# Patient Record
Sex: FEMALE | Race: WHITE | NOT HISPANIC OR LATINO | Employment: OTHER | URBAN - METROPOLITAN AREA
[De-identification: names, ages, dates, MRNs, and addresses within clinical notes are randomized per-mention and may not be internally consistent; named-entity substitution may affect disease eponyms.]

---

## 2017-04-05 DIAGNOSIS — I10 ESSENTIAL (PRIMARY) HYPERTENSION: ICD-10-CM

## 2017-04-05 DIAGNOSIS — E55.9 VITAMIN D DEFICIENCY: ICD-10-CM

## 2017-04-05 DIAGNOSIS — E53.8 DEFICIENCY OF OTHER SPECIFIED B GROUP VITAMINS: ICD-10-CM

## 2017-04-05 DIAGNOSIS — E78.00 PURE HYPERCHOLESTEROLEMIA: ICD-10-CM

## 2017-04-05 DIAGNOSIS — E01.8 OTHER IODINE-DEFICIENCY RELATED THYROID DISORDERS AND ALLIED CONDITIONS: ICD-10-CM

## 2017-04-07 ENCOUNTER — LAB (OUTPATIENT)
Dept: LAB | Facility: MEDICAL CENTER | Age: 72
End: 2017-04-07
Payer: MEDICARE

## 2017-04-07 DIAGNOSIS — I10 ESSENTIAL (PRIMARY) HYPERTENSION: ICD-10-CM

## 2017-04-07 DIAGNOSIS — E78.00 PURE HYPERCHOLESTEROLEMIA: ICD-10-CM

## 2017-04-07 DIAGNOSIS — E53.8 DEFICIENCY OF OTHER SPECIFIED B GROUP VITAMINS: ICD-10-CM

## 2017-04-07 DIAGNOSIS — E01.8 OTHER IODINE-DEFICIENCY RELATED THYROID DISORDERS AND ALLIED CONDITIONS: ICD-10-CM

## 2017-04-07 LAB
ALBUMIN SERPL BCP-MCNC: 3.8 G/DL (ref 3.5–5)
ALP SERPL-CCNC: 69 U/L (ref 46–116)
ALT SERPL W P-5'-P-CCNC: 25 U/L (ref 12–78)
ANION GAP SERPL CALCULATED.3IONS-SCNC: 6 MMOL/L (ref 4–13)
AST SERPL W P-5'-P-CCNC: 20 U/L (ref 5–45)
BASOPHILS # BLD AUTO: 0.03 THOUSANDS/ΜL (ref 0–0.1)
BASOPHILS NFR BLD AUTO: 1 % (ref 0–1)
BILIRUB SERPL-MCNC: 0.49 MG/DL (ref 0.2–1)
BUN SERPL-MCNC: 11 MG/DL (ref 5–25)
CALCIUM SERPL-MCNC: 9 MG/DL (ref 8.3–10.1)
CHLORIDE SERPL-SCNC: 106 MMOL/L (ref 100–108)
CHOLEST SERPL-MCNC: 255 MG/DL (ref 50–200)
CO2 SERPL-SCNC: 28 MMOL/L (ref 21–32)
CREAT SERPL-MCNC: 0.74 MG/DL (ref 0.6–1.3)
EOSINOPHIL # BLD AUTO: 0.2 THOUSAND/ΜL (ref 0–0.61)
EOSINOPHIL NFR BLD AUTO: 6 % (ref 0–6)
ERYTHROCYTE [DISTWIDTH] IN BLOOD BY AUTOMATED COUNT: 13.8 % (ref 11.6–15.1)
GFR SERPL CREATININE-BSD FRML MDRD: >60 ML/MIN/1.73SQ M
GLUCOSE P FAST SERPL-MCNC: 80 MG/DL (ref 65–99)
HCT VFR BLD AUTO: 41.4 % (ref 34.8–46.1)
HDLC SERPL-MCNC: 101 MG/DL (ref 40–60)
HGB BLD-MCNC: 13.5 G/DL (ref 11.5–15.4)
LDLC SERPL CALC-MCNC: 142 MG/DL (ref 0–100)
LYMPHOCYTES # BLD AUTO: 1.08 THOUSANDS/ΜL (ref 0.6–4.47)
LYMPHOCYTES NFR BLD AUTO: 34 % (ref 14–44)
MAGNESIUM SERPL-MCNC: 2.5 MG/DL (ref 1.6–2.6)
MCH RBC QN AUTO: 31.9 PG (ref 26.8–34.3)
MCHC RBC AUTO-ENTMCNC: 32.6 G/DL (ref 31.4–37.4)
MCV RBC AUTO: 98 FL (ref 82–98)
MONOCYTES # BLD AUTO: 0.44 THOUSAND/ΜL (ref 0.17–1.22)
MONOCYTES NFR BLD AUTO: 14 % (ref 4–12)
NEUTROPHILS # BLD AUTO: 1.41 THOUSANDS/ΜL (ref 1.85–7.62)
NEUTS SEG NFR BLD AUTO: 45 % (ref 43–75)
NRBC BLD AUTO-RTO: 0 /100 WBCS
PLATELET # BLD AUTO: 245 THOUSANDS/UL (ref 149–390)
PMV BLD AUTO: 10.2 FL (ref 8.9–12.7)
POTASSIUM SERPL-SCNC: 4 MMOL/L (ref 3.5–5.3)
PROT SERPL-MCNC: 6.9 G/DL (ref 6.4–8.2)
RBC # BLD AUTO: 4.23 MILLION/UL (ref 3.81–5.12)
SODIUM SERPL-SCNC: 140 MMOL/L (ref 136–145)
T4 FREE SERPL-MCNC: 1.17 NG/DL (ref 0.76–1.46)
TRIGL SERPL-MCNC: 62 MG/DL
TSH SERPL DL<=0.05 MIU/L-ACNC: 2.61 UIU/ML (ref 0.36–3.74)
WBC # BLD AUTO: 3.16 THOUSAND/UL (ref 4.31–10.16)

## 2017-04-07 PROCEDURE — 84439 ASSAY OF FREE THYROXINE: CPT

## 2017-04-07 PROCEDURE — 80061 LIPID PANEL: CPT

## 2017-04-07 PROCEDURE — 84443 ASSAY THYROID STIM HORMONE: CPT

## 2017-04-07 PROCEDURE — 85025 COMPLETE CBC W/AUTO DIFF WBC: CPT

## 2017-04-07 PROCEDURE — 83735 ASSAY OF MAGNESIUM: CPT

## 2017-04-07 PROCEDURE — 80053 COMPREHEN METABOLIC PANEL: CPT

## 2017-04-07 PROCEDURE — 36415 COLL VENOUS BLD VENIPUNCTURE: CPT

## 2017-04-12 ENCOUNTER — ALLSCRIPTS OFFICE VISIT (OUTPATIENT)
Dept: OTHER | Facility: OTHER | Age: 72
End: 2017-04-12

## 2017-08-24 ENCOUNTER — LAB (OUTPATIENT)
Dept: LAB | Facility: MEDICAL CENTER | Age: 72
End: 2017-08-24
Payer: MEDICARE

## 2017-08-24 ENCOUNTER — HOSPITAL ENCOUNTER (OUTPATIENT)
Dept: NON INVASIVE DIAGNOSTICS | Facility: MEDICAL CENTER | Age: 72
Discharge: HOME | End: 2017-08-24
Payer: MEDICARE

## 2017-08-24 DIAGNOSIS — I10 ESSENTIAL (PRIMARY) HYPERTENSION: ICD-10-CM

## 2017-08-24 DIAGNOSIS — E55.9 VITAMIN D DEFICIENCY: ICD-10-CM

## 2017-08-24 DIAGNOSIS — E01.8 OTHER IODINE-DEFICIENCY RELATED THYROID DISORDERS AND ALLIED CONDITIONS: ICD-10-CM

## 2017-08-24 DIAGNOSIS — E78.00 PURE HYPERCHOLESTEROLEMIA: ICD-10-CM

## 2017-08-24 LAB
25(OH)D3 SERPL-MCNC: 33.2 NG/ML (ref 30–100)
ALBUMIN SERPL BCP-MCNC: 3.8 G/DL (ref 3.5–5)
ALP SERPL-CCNC: 63 U/L (ref 46–116)
ALT SERPL W P-5'-P-CCNC: 25 U/L (ref 12–78)
ANION GAP SERPL CALCULATED.3IONS-SCNC: 6 MMOL/L (ref 4–13)
AST SERPL W P-5'-P-CCNC: 23 U/L (ref 5–45)
BILIRUB SERPL-MCNC: 0.86 MG/DL (ref 0.2–1)
BUN SERPL-MCNC: 9 MG/DL (ref 5–25)
CALCIUM SERPL-MCNC: 9.1 MG/DL (ref 8.3–10.1)
CHLORIDE SERPL-SCNC: 105 MMOL/L (ref 100–108)
CHOLEST SERPL-MCNC: 286 MG/DL (ref 50–200)
CO2 SERPL-SCNC: 26 MMOL/L (ref 21–32)
CREAT SERPL-MCNC: 0.58 MG/DL (ref 0.6–1.3)
GFR SERPL CREATININE-BSD FRML MDRD: 91 ML/MIN/1.73SQ M
GLUCOSE P FAST SERPL-MCNC: 91 MG/DL (ref 65–99)
HDLC SERPL-MCNC: 97 MG/DL (ref 40–60)
LDLC SERPL CALC-MCNC: 174 MG/DL (ref 0–100)
POTASSIUM SERPL-SCNC: 4.1 MMOL/L (ref 3.5–5.3)
PROT SERPL-MCNC: 7.1 G/DL (ref 6.4–8.2)
SODIUM SERPL-SCNC: 137 MMOL/L (ref 136–145)
T4 FREE SERPL-MCNC: 1.27 NG/DL (ref 0.76–1.46)
TRIGL SERPL-MCNC: 74 MG/DL
TSH SERPL DL<=0.05 MIU/L-ACNC: 2.12 UIU/ML (ref 0.36–3.74)

## 2017-08-24 PROCEDURE — 93306 TTE W/DOPPLER COMPLETE: CPT

## 2017-08-24 PROCEDURE — 80053 COMPREHEN METABOLIC PANEL: CPT

## 2017-08-24 PROCEDURE — 36415 COLL VENOUS BLD VENIPUNCTURE: CPT

## 2017-08-24 PROCEDURE — 84439 ASSAY OF FREE THYROXINE: CPT

## 2017-08-24 PROCEDURE — 82306 VITAMIN D 25 HYDROXY: CPT

## 2017-08-24 PROCEDURE — 84443 ASSAY THYROID STIM HORMONE: CPT

## 2017-08-24 PROCEDURE — 80061 LIPID PANEL: CPT

## 2017-09-14 ENCOUNTER — ALLSCRIPTS OFFICE VISIT (OUTPATIENT)
Dept: OTHER | Facility: OTHER | Age: 72
End: 2017-09-14

## 2017-09-14 DIAGNOSIS — E01.8 OTHER IODINE-DEFICIENCY RELATED THYROID DISORDERS AND ALLIED CONDITIONS: ICD-10-CM

## 2017-09-14 DIAGNOSIS — E78.00 PURE HYPERCHOLESTEROLEMIA: ICD-10-CM

## 2017-11-03 ENCOUNTER — GENERIC CONVERSION - ENCOUNTER (OUTPATIENT)
Dept: OTHER | Facility: OTHER | Age: 72
End: 2017-11-03

## 2018-01-12 VITALS
OXYGEN SATURATION: 96 % | SYSTOLIC BLOOD PRESSURE: 140 MMHG | HEIGHT: 64 IN | HEART RATE: 78 BPM | WEIGHT: 142.13 LBS | TEMPERATURE: 97.8 F | DIASTOLIC BLOOD PRESSURE: 78 MMHG | BODY MASS INDEX: 24.27 KG/M2

## 2018-01-13 VITALS
BODY MASS INDEX: 24.1 KG/M2 | SYSTOLIC BLOOD PRESSURE: 154 MMHG | WEIGHT: 141.13 LBS | HEART RATE: 72 BPM | HEIGHT: 64 IN | TEMPERATURE: 98.4 F | OXYGEN SATURATION: 96 % | DIASTOLIC BLOOD PRESSURE: 82 MMHG

## 2018-01-14 NOTE — MISCELLANEOUS
Message   Recorded as Task   Date: 05/16/2016 08:37 AM, Created By: Coy Curling   Task Name: Med Renewal Request   Assigned To: Jo Beltran   Regarding Patient: Niels Winn, Status: Active   CommentEllard Dinning - 16 May 2016 8:37 AM     TASK CREATED  Caller: Self; [phone] (Home)  resent rx to mail order pharm        Active Problems    1  Atrophic vaginitis (627 3) (N95 2)   2  Elevated BP (401 9) (I10)   3  Encounter for gynecological examination with abnormal finding (V72 31) (Z01 411)   4  Encounter for screening colonoscopy (V76 51) (Z12 11)   5  Encounter for screening mammogram for malignant neoplasm of breast (V76 12)   (Z12 31)   6  Hypercholesterolemia (272 0) (E78 0)   7  Hypothyroidism, iodine (244 2) (E01 8)   8  Screening for depression (V79 0) (Z13 89)   9  Screening for genitourinary condition (V81 6) (Z13 89)   10  Screening for neurological condition (V80 09) (Z13 89)   11  Skin rash (782 1) (R21)   12  Vitamin B12 deficiency (266 2) (E53 8)   13  Vitamin D deficiency (268 9) (E55 9)    Current Meds   1  Calcium 600+D Plus Minerals 600-400 MG-UNIT Oral Tablet Chewable; take 1 tablet by   mouth twice daily; Therapy: (Recorded:43Nqh2507) to Recorded   2  Fish Oil CAPS; twice daily; Therapy: (Recorded:24Fiw0044) to Recorded   3  Levothyroxine Sodium 50 MCG Oral Tablet; Take 1 5 tablets on Tuesday, Thursday, and   Saturday and take 1 tablet all others; Therapy: 23BDM9206 to (Evaluate:[id number])  Requested for: 21FVC6196; Last   Rx:11Mar2016 Ordered   4  Vagifem 10 MCG Vaginal Tablet; insert 1 tablet intravaginally twice per week; Therapy: 33MAC4357 to (Vani Marilyn)  Requested for: 46PZW6950; Last   Rx:[id number] Ordered   5  Vagifem 10 MCG Vaginal Tablet; insert 1 tablet intravaginally twice per week; Therapy: 28EWH4624 to (Kirill Abarca)  Requested for: 44LIM3404; Last   Rx:14Oct2014 Ordered    Allergies    1  No Known Drug Allergies    2   No Known Food Allergies   3   Seasonal    Plan  Atrophic vaginitis    · Vagifem 10 MCG Vaginal Tablet; insert 1 tablet intravaginally twice per week    Signatures   Electronically signed by : Patrick Samuel, ; May 16 2016  8:37AM EST                       (Author)

## 2018-03-23 ENCOUNTER — LAB (OUTPATIENT)
Dept: LAB | Facility: MEDICAL CENTER | Age: 73
End: 2018-03-23
Payer: MEDICARE

## 2018-03-23 DIAGNOSIS — E78.00 PURE HYPERCHOLESTEROLEMIA: ICD-10-CM

## 2018-03-23 DIAGNOSIS — E01.8 OTHER IODINE-DEFICIENCY RELATED THYROID DISORDERS AND ALLIED CONDITIONS: ICD-10-CM

## 2018-03-23 LAB
CHOLEST SERPL-MCNC: 275 MG/DL (ref 50–200)
HDLC SERPL-MCNC: 113 MG/DL (ref 40–60)
LDLC SERPL CALC-MCNC: 145 MG/DL (ref 0–100)
T4 FREE SERPL-MCNC: 1.07 NG/DL (ref 0.76–1.46)
TRIGL SERPL-MCNC: 84 MG/DL
TSH SERPL DL<=0.05 MIU/L-ACNC: 4.39 UIU/ML (ref 0.36–3.74)

## 2018-03-23 PROCEDURE — 80061 LIPID PANEL: CPT

## 2018-03-23 PROCEDURE — 36415 COLL VENOUS BLD VENIPUNCTURE: CPT

## 2018-03-23 PROCEDURE — 84439 ASSAY OF FREE THYROXINE: CPT

## 2018-03-23 PROCEDURE — 84443 ASSAY THYROID STIM HORMONE: CPT

## 2018-03-28 ENCOUNTER — OFFICE VISIT (OUTPATIENT)
Dept: INTERNAL MEDICINE CLINIC | Age: 73
End: 2018-03-28
Payer: MEDICARE

## 2018-03-28 VITALS
TEMPERATURE: 97.9 F | OXYGEN SATURATION: 98 % | WEIGHT: 142.6 LBS | BODY MASS INDEX: 24.34 KG/M2 | HEIGHT: 64 IN | SYSTOLIC BLOOD PRESSURE: 148 MMHG | HEART RATE: 86 BPM | DIASTOLIC BLOOD PRESSURE: 80 MMHG

## 2018-03-28 DIAGNOSIS — E03.8 OTHER SPECIFIED HYPOTHYROIDISM: ICD-10-CM

## 2018-03-28 DIAGNOSIS — E01.8 IODINE HYPOTHYROIDISM: Primary | ICD-10-CM

## 2018-03-28 DIAGNOSIS — I10 HYPERTENSION, ESSENTIAL: ICD-10-CM

## 2018-03-28 DIAGNOSIS — E55.9 VITAMIN D DEFICIENCY: ICD-10-CM

## 2018-03-28 DIAGNOSIS — E78.00 HYPERCHOLESTEROLEMIA: ICD-10-CM

## 2018-03-28 PROBLEM — L71.9 ROSACEA: Status: ACTIVE | Noted: 2017-04-15

## 2018-03-28 PROCEDURE — 99214 OFFICE O/P EST MOD 30 MIN: CPT | Performed by: NURSE PRACTITIONER

## 2018-03-28 RX ORDER — ESTRADIOL 10 UG/1
INSERT VAGINAL
COMMUNITY
Start: 2017-05-14 | End: 2018-05-28 | Stop reason: SDUPTHER

## 2018-03-28 RX ORDER — LEVOTHYROXINE SODIUM 0.05 MG/1
TABLET ORAL
Qty: 36 TABLET | Refills: 5 | Status: SHIPPED | OUTPATIENT
Start: 2018-03-28 | End: 2018-09-21 | Stop reason: SDUPTHER

## 2018-03-28 RX ORDER — MOMETASONE FUROATE 1 MG/G
CREAM TOPICAL DAILY PRN
COMMUNITY
Start: 2011-06-09 | End: 2019-04-02 | Stop reason: SDUPTHER

## 2018-03-28 RX ORDER — PERPHENAZINE/AMITRIPTYLINE HCL 2 MG-25 MG
1 TABLET ORAL DAILY
COMMUNITY
End: 2021-08-18 | Stop reason: ALTCHOICE

## 2018-03-28 RX ORDER — LEVOTHYROXINE SODIUM 0.05 MG/1
1 TABLET ORAL
COMMUNITY
Start: 2012-01-27 | End: 2018-03-28 | Stop reason: SDUPTHER

## 2018-03-28 NOTE — PROGRESS NOTES
Assessment/Plan:    Hypertension:  Patient is here for follow-up hypertension  Patient blood pressure was initially elevated on evaluation at 166/82  Repeat blood pressure after patient's sat for 15 mins  was 148/80  Patient reports her blood pressure is normal at home  Will check 24 hour blood pressure monitoring  Patient to continue trend her blood pressure at home with a goal blood pressure less than 140/80  Continue with healthy diet and exercise on a routine basis  Hyperlipidemia:  Continue with fish oil  Cholesterol is stable  Continue with healthy diet and exercise  Discussed with patient that her total cholesterol appears elevated due to her HDL is very well at 115  Hypothyroid: TSH was slightly elevated however patient is asymptomatic continue current dosing of levothyroxine and repeat in three months  Of note her levothyroxine is currently 50 mcg she takes 1 5 tablets on Tuesday Thursday and Saturday and one tablet on all others  Vitamin-D:  Will update vitamin-D level    Of note patient is followed by her gyn for her estradiol for atrophic vaginitis     Diagnoses and all orders for this visit:    Iodine hypothyroidism  -     levothyroxine 50 mcg tablet; 1 5 tablet on Tuesday,Thursday and Saturday, 1 tablet all other days    Other specified hypothyroidism  -     TSH, 3rd generation with T4 reflex; Future    Vitamin D deficiency  -     Vitamin D 25 hydroxy; Future    Hypercholesterolemia  -     CBC and differential; Future  -     Comprehensive metabolic panel; Future  -     Lipid panel; Future    Hypertension, essential  -     24 hour blood pressure monitoring; Future    Other orders  -     Discontinue: levothyroxine 50 mcg tablet;  Take 1 tablet by mouth 1 5 tablet on Tuesday,Thursday and Saturday, 1 tablet all other days  -     Cholecalciferol (VITAMIN D3) 2000 units CHEW; Chew 2 Units daily  -     mometasone (ELOCON) 0 1 % cream; Apply topically daily as needed  -     Flaxseed, Linseed, (FLAX SEED OIL) 1300 MG CAPS; Take 1 capsule by mouth daily  -     Omega-3 Fatty Acids (FISH OIL) 645 MG CAPS; Take 1 capsule by mouth daily  -     estradiol (YUVAFEM) 10 MCG TABS vaginal tablet; Insert into the vagina Insert 1 tablet vaginally twice per week        Subjective:      Patient ID: Marvel Holt is a 74 y o  female  Patient is here for routine follow-up  She is doing well no concerns  She needs to review blood work  Thyroid Problem   Presents for follow-up visit  Patient reports no anxiety, cold intolerance, constipation, depressed mood, diaphoresis, diarrhea, dry skin, fatigue, hair loss, palpitations, visual change, weight gain or weight loss  The symptoms have been stable  Hypertension:  Pt is here to follow-up no hypertension  Co-morbidities include HLD, hypothyroid  Associated symptoms include None  Patient denies chest pain shortness of breath, lower extremity edema, dizziness, lightheadedness, vision changes  Pt is currently taking no medications  She has never been on medication in the past   Pt does checks BP at home and gets readings 130s/80s  Pt does exercise on routine basis - 5 miles on bicycle a day and walks in the warmer months  Diet includes healthy - limited red meat (few times a year), chicken, salmon, shrimp, salads, fruit, oatmeal, quinoa, almonds, drinks only water and green tea  Limited added salt  Last eye exam 10/2017  Last labs 03/2018    Hyperlipemia:  Pt is here for follow-up hyperlipidemia  Pt is doing well with no concerns  Pt is currently taking fish oil  Tolerating well  Side effects of medication include none  Pt diet consists of very healthy - limited red meat (few times a year), chicken, salmon, shrimp, salads, fruit, oatmeal, quinoa, almonds, drinks only water and green tea  Limited added salt  The pt does exercise on a routine basis  Last lipid panel was 03/2018      The following portions of the patient's history were reviewed and updated as appropriate: allergies, current medications, past family history, past medical history, past social history, past surgical history and problem list     Review of Systems   Constitutional: Negative for chills, diaphoresis, fatigue, fever, unexpected weight change, weight gain and weight loss  HENT: Negative for congestion, ear pain, postnasal drip and sore throat  Eyes: Negative for visual disturbance  Respiratory: Negative for cough, shortness of breath and wheezing  Cardiovascular: Negative for chest pain and palpitations  Gastrointestinal: Negative for abdominal pain, constipation, diarrhea, nausea and vomiting  Endocrine: Negative for cold intolerance  Musculoskeletal: Negative for back pain  Skin: Negative for rash  Neurological: Negative for dizziness, syncope, speech difficulty, light-headedness and headaches  Psychiatric/Behavioral: Negative for confusion  The patient is not nervous/anxious            Past Medical History:   Diagnosis Date    Disease of thyroid gland     Osteopenia     last assessed 12/17/13    Vaginal atrophy        Current Outpatient Prescriptions:     Cholecalciferol (VITAMIN D3) 2000 units CHEW, Chew 2 Units daily, Disp: , Rfl:     estradiol (YUVAFEM) 10 MCG TABS vaginal tablet, Insert into the vagina Insert 1 tablet vaginally twice per week, Disp: , Rfl:     Flaxseed, Linseed, (FLAX SEED OIL) 1300 MG CAPS, Take 1 capsule by mouth daily, Disp: , Rfl:     levothyroxine 50 mcg tablet, Take 1 tablet by mouth 1 5 tablet on Tuesday,Thursday and Saturday, 1 tablet all other days, Disp: , Rfl:     mometasone (ELOCON) 0 1 % cream, Apply topically daily as needed, Disp: , Rfl:     Omega-3 Fatty Acids (FISH OIL) 645 MG CAPS, Take 1 capsule by mouth daily, Disp: , Rfl:     Allergies   Allergen Reactions    Other      Seasonal allergies        Social History   Past Surgical History:   Procedure Laterality Date    DILATION AND CURETTAGE OF UTERUS      TUBAL LIGATION       Family History   Problem Relation Age of Onset    Lymphoma Mother 79     acute    Hyperlipidemia Father     Peripheral vascular disease Father     Breast cancer Sister     Leukemia Brother     Other Other      4 children living       Objective:  /82 (BP Location: Left arm, Patient Position: Sitting, Cuff Size: Standard)   Pulse 86   Temp 97 9 °F (36 6 °C) (Tympanic)   Ht 5' 3 82" (1 621 m)   Wt 64 7 kg (142 lb 9 6 oz)   SpO2 98%   BMI 24 62 kg/m²     Repeat /80     Physical Exam   Constitutional: She is oriented to person, place, and time  She appears well-developed and well-nourished  No distress  Eyes: Pupils are equal, round, and reactive to light  Neck: Neck supple  Cardiovascular: Normal rate, regular rhythm and normal heart sounds  No pedal edema   Pulmonary/Chest: Effort normal and breath sounds normal  No respiratory distress  She has no wheezes  Musculoskeletal: Normal range of motion  Neurological: She is alert and oriented to person, place, and time  No cranial nerve deficit  Skin: Skin is warm and dry  Rosacea noted to cheeks   Psychiatric: She has a normal mood and affect  Her behavior is normal  Judgment and thought content normal    Nursing note and vitals reviewed

## 2018-03-28 NOTE — PATIENT INSTRUCTIONS
Hypertension:  Patient's blood pressure was elevated initially on arrival   It did decrease some 148/80  Patient reports normal blood pressure when she checks at home  Will check 24 hour blood pressure monitoring  Patient to continue trend her blood pressure at home goal blood pressure less than 140/80  Continue with healthy diet and exercise on a routine basis  Hyperlipidemia:  Continue with fish oil  Cholesterol is stable  Continue with healthy diet and exercise    hypothyroid: Continue current dosing of levothyroxine  TSH was slightly elevated however patient is asymptomatic continue current dosing of levothyroxine and repeat in three months       vitamin-D:  Will update vitamin-D level

## 2018-05-28 DIAGNOSIS — N95.2 ATROPHIC VAGINITIS: Primary | ICD-10-CM

## 2018-05-29 ENCOUNTER — TELEPHONE (OUTPATIENT)
Dept: OBGYN CLINIC | Facility: MEDICAL CENTER | Age: 73
End: 2018-05-29

## 2018-05-29 RX ORDER — ESTRADIOL 10 UG/1
TABLET VAGINAL
Qty: 24 TABLET | Refills: 3 | Status: SHIPPED | OUTPATIENT
Start: 2018-05-29 | End: 2019-05-21 | Stop reason: SDUPTHER

## 2018-09-21 DIAGNOSIS — E01.8 IODINE HYPOTHYROIDISM: ICD-10-CM

## 2018-09-21 RX ORDER — LEVOTHYROXINE SODIUM 0.05 MG/1
TABLET ORAL
Qty: 36 TABLET | Refills: 5 | Status: SHIPPED | OUTPATIENT
Start: 2018-09-21 | End: 2019-03-18 | Stop reason: SDUPTHER

## 2018-10-26 ENCOUNTER — LAB (OUTPATIENT)
Dept: LAB | Facility: MEDICAL CENTER | Age: 73
End: 2018-10-26
Payer: MEDICARE

## 2018-10-26 DIAGNOSIS — E03.8 OTHER SPECIFIED HYPOTHYROIDISM: ICD-10-CM

## 2018-10-26 DIAGNOSIS — E55.9 VITAMIN D DEFICIENCY: ICD-10-CM

## 2018-10-26 DIAGNOSIS — E78.00 HYPERCHOLESTEROLEMIA: ICD-10-CM

## 2018-10-26 LAB
25(OH)D3 SERPL-MCNC: 43.2 NG/ML (ref 30–100)
ALBUMIN SERPL BCP-MCNC: 4.2 G/DL (ref 3.5–5)
ALP SERPL-CCNC: 66 U/L (ref 46–116)
ALT SERPL W P-5'-P-CCNC: 25 U/L (ref 12–78)
ANION GAP SERPL CALCULATED.3IONS-SCNC: 8 MMOL/L (ref 4–13)
AST SERPL W P-5'-P-CCNC: 23 U/L (ref 5–45)
BASOPHILS # BLD AUTO: 0.04 THOUSANDS/ΜL (ref 0–0.1)
BASOPHILS NFR BLD AUTO: 1 % (ref 0–1)
BILIRUB SERPL-MCNC: 0.83 MG/DL (ref 0.2–1)
BUN SERPL-MCNC: 14 MG/DL (ref 5–25)
CALCIUM SERPL-MCNC: 9.3 MG/DL (ref 8.3–10.1)
CHLORIDE SERPL-SCNC: 105 MMOL/L (ref 100–108)
CHOLEST SERPL-MCNC: 265 MG/DL (ref 50–200)
CO2 SERPL-SCNC: 26 MMOL/L (ref 21–32)
CREAT SERPL-MCNC: 0.7 MG/DL (ref 0.6–1.3)
EOSINOPHIL # BLD AUTO: 0.35 THOUSAND/ΜL (ref 0–0.61)
EOSINOPHIL NFR BLD AUTO: 8 % (ref 0–6)
ERYTHROCYTE [DISTWIDTH] IN BLOOD BY AUTOMATED COUNT: 13.7 % (ref 11.6–15.1)
GFR SERPL CREATININE-BSD FRML MDRD: 85 ML/MIN/1.73SQ M
GLUCOSE P FAST SERPL-MCNC: 84 MG/DL (ref 65–99)
HCT VFR BLD AUTO: 44.8 % (ref 34.8–46.1)
HDLC SERPL-MCNC: 104 MG/DL (ref 40–60)
HGB BLD-MCNC: 14.4 G/DL (ref 11.5–15.4)
IMM GRANULOCYTES # BLD AUTO: 0.01 THOUSAND/UL (ref 0–0.2)
IMM GRANULOCYTES NFR BLD AUTO: 0 % (ref 0–2)
LDLC SERPL CALC-MCNC: 145 MG/DL (ref 0–100)
LYMPHOCYTES # BLD AUTO: 1.02 THOUSANDS/ΜL (ref 0.6–4.47)
LYMPHOCYTES NFR BLD AUTO: 23 % (ref 14–44)
MCH RBC QN AUTO: 31.7 PG (ref 26.8–34.3)
MCHC RBC AUTO-ENTMCNC: 32.1 G/DL (ref 31.4–37.4)
MCV RBC AUTO: 99 FL (ref 82–98)
MONOCYTES # BLD AUTO: 0.41 THOUSAND/ΜL (ref 0.17–1.22)
MONOCYTES NFR BLD AUTO: 9 % (ref 4–12)
NEUTROPHILS # BLD AUTO: 2.55 THOUSANDS/ΜL (ref 1.85–7.62)
NEUTS SEG NFR BLD AUTO: 59 % (ref 43–75)
NONHDLC SERPL-MCNC: 161 MG/DL
NRBC BLD AUTO-RTO: 0 /100 WBCS
PLATELET # BLD AUTO: 238 THOUSANDS/UL (ref 149–390)
PMV BLD AUTO: 10.7 FL (ref 8.9–12.7)
POTASSIUM SERPL-SCNC: 4.1 MMOL/L (ref 3.5–5.3)
PROT SERPL-MCNC: 7.5 G/DL (ref 6.4–8.2)
RBC # BLD AUTO: 4.54 MILLION/UL (ref 3.81–5.12)
SODIUM SERPL-SCNC: 139 MMOL/L (ref 136–145)
TRIGL SERPL-MCNC: 80 MG/DL
TSH SERPL DL<=0.05 MIU/L-ACNC: 2.58 UIU/ML (ref 0.36–3.74)
WBC # BLD AUTO: 4.38 THOUSAND/UL (ref 4.31–10.16)

## 2018-10-26 PROCEDURE — 80053 COMPREHEN METABOLIC PANEL: CPT

## 2018-10-26 PROCEDURE — 85025 COMPLETE CBC W/AUTO DIFF WBC: CPT

## 2018-10-26 PROCEDURE — 80061 LIPID PANEL: CPT

## 2018-10-26 PROCEDURE — 84443 ASSAY THYROID STIM HORMONE: CPT

## 2018-10-26 PROCEDURE — 36415 COLL VENOUS BLD VENIPUNCTURE: CPT

## 2018-10-26 PROCEDURE — 82306 VITAMIN D 25 HYDROXY: CPT

## 2018-10-29 PROBLEM — I10 HTN (HYPERTENSION): Status: RESOLVED | Noted: 2017-04-12 | Resolved: 2018-10-29

## 2018-10-30 ENCOUNTER — OFFICE VISIT (OUTPATIENT)
Dept: INTERNAL MEDICINE CLINIC | Facility: CLINIC | Age: 73
End: 2018-10-30
Payer: MEDICARE

## 2018-10-30 VITALS
BODY MASS INDEX: 24.14 KG/M2 | HEIGHT: 64 IN | SYSTOLIC BLOOD PRESSURE: 138 MMHG | WEIGHT: 141.4 LBS | DIASTOLIC BLOOD PRESSURE: 72 MMHG | OXYGEN SATURATION: 98 % | TEMPERATURE: 97.9 F | HEART RATE: 80 BPM

## 2018-10-30 DIAGNOSIS — E78.00 HYPERCHOLESTEROLEMIA: ICD-10-CM

## 2018-10-30 DIAGNOSIS — Z12.11 SCREENING FOR COLON CANCER: Primary | ICD-10-CM

## 2018-10-30 DIAGNOSIS — L71.9 ROSACEA: ICD-10-CM

## 2018-10-30 DIAGNOSIS — E55.9 VITAMIN D DEFICIENCY: ICD-10-CM

## 2018-10-30 DIAGNOSIS — E03.8 OTHER SPECIFIED HYPOTHYROIDISM: ICD-10-CM

## 2018-10-30 DIAGNOSIS — I10 ESSENTIAL HYPERTENSION: ICD-10-CM

## 2018-10-30 DIAGNOSIS — I10 HYPERTENSION, ESSENTIAL: ICD-10-CM

## 2018-10-30 PROCEDURE — 99214 OFFICE O/P EST MOD 30 MIN: CPT | Performed by: INTERNAL MEDICINE

## 2018-10-30 RX ORDER — OLMESARTAN MEDOXOMIL 40 MG/1
20 TABLET ORAL DAILY
Qty: 30 TABLET | Refills: 3 | Status: SHIPPED | OUTPATIENT
Start: 2018-10-30 | End: 2019-04-02 | Stop reason: SDUPTHER

## 2018-10-30 NOTE — PROGRESS NOTES
Assessment/Plan:    Hypertension, essential  After discussions to Alley Velasco was started on olmesartan 40 mg tablet to be taken half tablet daily  She is to check her blood pressure regularly and if the readings are high and she is tolerating her pill she can't take a full pill  If however her readings are normal then she should continue on the half a pill daily for now until she returns    Hypercholesterolemia  Elevated LDL C bleedings were reviewed with her  Will check another lipid panel before her next visit  Plan to start a small dose of Lipitor at her next visit  Continue the present plan of diet and exercise  She is doing wonderful  Diagnoses and all orders for this visit:    Screening for colon cancer  -     Ambulatory referral to Gastroenterology; Future  -     Lipid Panel with Direct LDL reflex; Future  -     CBC and differential; Future  -     TSH, 3rd generation with Free T4 reflex; Future    Essential hypertension  -     Lipid Panel with Direct LDL reflex; Future  -     CBC and differential; Future  -     TSH, 3rd generation with Free T4 reflex; Future  -     olmesartan (BENICAR) 40 mg tablet; Take 0 5 tablets (20 mg total) by mouth daily Increase as tolerated    Hypercholesterolemia  -     Lipid Panel with Direct LDL reflex; Future  -     CBC and differential; Future  -     TSH, 3rd generation with Free T4 reflex; Future    Hypertension, essential  -     Lipid Panel with Direct LDL reflex; Future  -     CBC and differential; Future  -     TSH, 3rd generation with Free T4 reflex; Future    Other specified hypothyroidism  -     Lipid Panel with Direct LDL reflex; Future  -     CBC and differential; Future  -     TSH, 3rd generation with Free T4 reflex; Future    Rosacea  -     Lipid Panel with Direct LDL reflex; Future  -     CBC and differential; Future  -     TSH, 3rd generation with Free T4 reflex; Future    Vitamin D deficiency  -     Lipid Panel with Direct LDL reflex;  Future  -     CBC and differential; Future  -     TSH, 3rd generation with Free T4 reflex; Future    Other orders  -     Cancel: Comprehensive metabolic panel; Future          Subjective:      Patient ID: Donia Lennox is a 74 y o  female  HPI     Thyroid Problem   Presents for follow-up visit  Patient reports no anxiety, cold intolerance, constipation, depressed mood, diaphoresis, diarrhea, dry skin, fatigue, hair loss, palpitations, visual change, weight gain or weight loss  The symptoms have been stable  Hypertension:  Pt is here to follow-up no hypertension  Co-morbidities include HLD, hypothyroid  Associated symptoms include None  Patient denies chest pain shortness of breath, lower extremity edema, dizziness, lightheadedness, vision changes  Pt is currently taking no medications  She has never been on medication in the past   Pt does checks BP at home and gets readings 130s/80s  Pt does exercise on routine basis - 5 miles on bicycle a day and walks in the warmer months  Diet includes healthy - limited red meat (few times a year), chicken, salmon, shrimp, salads, fruit, oatmeal, quinoa, almonds, drinks only water and green tea  Limited added salt  Last eye exam 10/2017  Last labs 03/2018    Hyperlipemia:  Pt is here for follow-up hyperlipidemia  Pt is doing well with no concerns  Pt is currently taking fish oil  Tolerating well  Side effects of medication include none  Pt diet consists of very healthy - limited red meat (few times a year), chicken, salmon, shrimp, salads, fruit, oatmeal, quinoa, almonds, drinks only water and green tea  Limited added salt  The pt does exercise on a routine basis  Last lipid panel was 03/2018      The following portions of the patient's history were reviewed and updated as appropriate: past family history, past medical history, past social history, past surgical history and problem list     Review of Systems      Constitutional: Negative for chills, diaphoresis, fatigue, fever, unexpected weight change, weight gain and weight loss  HENT: Negative for congestion, ear pain, postnasal drip and sore throat  Eyes: Negative for visual disturbance  Respiratory: Negative for cough, shortness of breath and wheezing  Cardiovascular: Negative for chest pain and palpitations  Gastrointestinal: Negative for abdominal pain, constipation, diarrhea, nausea and vomiting  Endocrine: Negative for cold intolerance  Musculoskeletal: Negative for back pain  Skin: Negative for rash  Neurological: Negative for dizziness, syncope, speech difficulty, light-headedness and headaches  Psychiatric/Behavioral: Negative for confusion   The patient is not nervous/anxious    Past Medical History:   Diagnosis Date    Disease of thyroid gland     Osteopenia     last assessed 12/17/13    Vaginal atrophy          Current Outpatient Prescriptions:     Cholecalciferol (VITAMIN D3) 2000 units CHEW, Chew 2 Units daily, Disp: , Rfl:     Flaxseed, Linseed, (FLAX SEED OIL) 1300 MG CAPS, Take 1 capsule by mouth daily, Disp: , Rfl:     levothyroxine 50 mcg tablet, Take 1 5 tablets (75 mcg) on TU, TH, SA & 1 tablet (50 mcg) all other days, Disp: 36 tablet, Rfl: 5    mometasone (ELOCON) 0 1 % cream, Apply topically daily as needed, Disp: , Rfl:     Omega-3 Fatty Acids (FISH OIL) 645 MG CAPS, Take 1 capsule by mouth daily, Disp: , Rfl:     YUVAFEM 10 MCG TABS vaginal tablet, INSERT 1 TABLET VAGINALLY  TWICE PER WEEK, Disp: 24 tablet, Rfl: 3    olmesartan (BENICAR) 40 mg tablet, Take 0 5 tablets (20 mg total) by mouth daily Increase as tolerated, Disp: 30 tablet, Rfl: 3    Allergies   Allergen Reactions    Other      Seasonal allergies        Social History   Past Surgical History:   Procedure Laterality Date    DILATION AND CURETTAGE OF UTERUS      TUBAL LIGATION       Family History   Problem Relation Age of Onset    Lymphoma Mother 79        acute    Hyperlipidemia Father     Peripheral vascular disease Father     Breast cancer Sister     Leukemia Brother     Other Other         4 children living       Objective:  /72 (BP Location: Left arm, Patient Position: Sitting, Cuff Size: Standard)   Pulse 80   Temp 97 9 °F (36 6 °C) (Oral)   Ht 5' 4 25" (1 632 m)   Wt 64 1 kg (141 lb 6 4 oz)   SpO2 98%   BMI 24 08 kg/m²     Recent Results (from the past 1344 hour(s))   CBC and differential    Collection Time: 10/26/18  9:05 AM   Result Value Ref Range    WBC 4 38 4 31 - 10 16 Thousand/uL    RBC 4 54 3 81 - 5 12 Million/uL    Hemoglobin 14 4 11 5 - 15 4 g/dL    Hematocrit 44 8 34 8 - 46 1 %    MCV 99 (H) 82 - 98 fL    MCH 31 7 26 8 - 34 3 pg    MCHC 32 1 31 4 - 37 4 g/dL    RDW 13 7 11 6 - 15 1 %    MPV 10 7 8 9 - 12 7 fL    Platelets 732 040 - 100 Thousands/uL    nRBC 0 /100 WBCs    Neutrophils Relative 59 43 - 75 %    Immat GRANS % 0 0 - 2 %    Lymphocytes Relative 23 14 - 44 %    Monocytes Relative 9 4 - 12 %    Eosinophils Relative 8 (H) 0 - 6 %    Basophils Relative 1 0 - 1 %    Neutrophils Absolute 2 55 1 85 - 7 62 Thousands/µL    Immature Grans Absolute 0 01 0 00 - 0 20 Thousand/uL    Lymphocytes Absolute 1 02 0 60 - 4 47 Thousands/µL    Monocytes Absolute 0 41 0 17 - 1 22 Thousand/µL    Eosinophils Absolute 0 35 0 00 - 0 61 Thousand/µL    Basophils Absolute 0 04 0 00 - 0 10 Thousands/µL   Comprehensive metabolic panel    Collection Time: 10/26/18  9:05 AM   Result Value Ref Range    Sodium 139 136 - 145 mmol/L    Potassium 4 1 3 5 - 5 3 mmol/L    Chloride 105 100 - 108 mmol/L    CO2 26 21 - 32 mmol/L    ANION GAP 8 4 - 13 mmol/L    BUN 14 5 - 25 mg/dL    Creatinine 0 70 0 60 - 1 30 mg/dL    Glucose, Fasting 84 65 - 99 mg/dL    Calcium 9 3 8 3 - 10 1 mg/dL    AST 23 5 - 45 U/L    ALT 25 12 - 78 U/L    Alkaline Phosphatase 66 46 - 116 U/L    Total Protein 7 5 6 4 - 8 2 g/dL    Albumin 4 2 3 5 - 5 0 g/dL    Total Bilirubin 0 83 0 20 - 1 00 mg/dL    eGFR 85 ml/min/1 73sq m   Lipid panel Collection Time: 10/26/18  9:05 AM   Result Value Ref Range    Cholesterol 265 (H) 50 - 200 mg/dL    Triglycerides 80 <=150 mg/dL    HDL, Direct 104 (H) 40 - 60 mg/dL    LDL Calculated 145 (H) 0 - 100 mg/dL    Non-HDL-Chol (CHOL-HDL) 161 mg/dl   TSH, 3rd generation with T4 reflex    Collection Time: 10/26/18  9:05 AM   Result Value Ref Range    TSH 3RD GENERATON 2 580 0 358 - 3 740 uIU/mL   Vitamin D 25 hydroxy    Collection Time: 10/26/18  9:05 AM   Result Value Ref Range    Vit D, 25-Hydroxy 43 2 30 0 - 100 0 ng/mL            Physical Exam      Constitutional: She is oriented to person, place, and time  She appears well-developed and well-nourished  No distress  Eyes: Pupils are equal, round, and reactive to light  Neck: Neck supple  Cardiovascular: Normal rate, regular rhythm and normal heart sounds  No pedal edema   Pulmonary/Chest: Effort normal and breath sounds normal  No respiratory distress  She has no wheezes  Musculoskeletal: Normal range of motion  Neurological: She is alert and oriented to person, place, and time  No cranial nerve deficit  Skin: Skin is warm and dry  Rosacea noted to cheeks   Psychiatric: She has a normal mood and affect   Her behavior is normal  Judgment and thought content normal    Nursing note and vitals reviewed

## 2018-10-30 NOTE — ASSESSMENT & PLAN NOTE
After discussions to Hermelinda Pedraza was started on olmesartan 40 mg tablet to be taken half tablet daily  She is to check her blood pressure regularly and if the readings are high and she is tolerating her pill she can't take a full pill    If however her readings are normal then she should continue on the half a pill daily for now until she returns

## 2018-10-30 NOTE — PATIENT INSTRUCTIONS
Hypertension, essential  After discussions to Arielle Kim was started on olmesartan 40 mg tablet to be taken half tablet daily  She is to check her blood pressure regularly and if the readings are high and she is tolerating her pill she can't take a full pill  If however her readings are normal then she should continue on the half a pill daily for now until she returns    Hypercholesterolemia  Elevated LDL C bleedings were reviewed with her  Will check another lipid panel before her next visit  Plan to start a small dose of Lipitor at her next visit  Continue the present plan of diet and exercise  She is doing wonderful

## 2018-10-30 NOTE — ASSESSMENT & PLAN NOTE
Elevated LDL C bleedings were reviewed with her  Will check another lipid panel before her next visit  Plan to start a small dose of Lipitor at her next visit  Continue the present plan of diet and exercise  She is doing wonderful

## 2019-03-18 DIAGNOSIS — E01.8 IODINE HYPOTHYROIDISM: ICD-10-CM

## 2019-03-18 RX ORDER — LEVOTHYROXINE SODIUM 0.05 MG/1
TABLET ORAL
Qty: 36 TABLET | Refills: 5 | Status: SHIPPED | OUTPATIENT
Start: 2019-03-18 | End: 2019-08-23 | Stop reason: SDUPTHER

## 2019-03-29 ENCOUNTER — LAB (OUTPATIENT)
Dept: LAB | Facility: MEDICAL CENTER | Age: 74
End: 2019-03-29
Payer: MEDICARE

## 2019-03-29 DIAGNOSIS — E78.00 HYPERCHOLESTEROLEMIA: ICD-10-CM

## 2019-03-29 DIAGNOSIS — E55.9 VITAMIN D DEFICIENCY: ICD-10-CM

## 2019-03-29 DIAGNOSIS — Z12.11 SCREENING FOR COLON CANCER: ICD-10-CM

## 2019-03-29 DIAGNOSIS — I10 ESSENTIAL HYPERTENSION: ICD-10-CM

## 2019-03-29 DIAGNOSIS — E03.8 OTHER SPECIFIED HYPOTHYROIDISM: ICD-10-CM

## 2019-03-29 DIAGNOSIS — I10 HYPERTENSION, ESSENTIAL: ICD-10-CM

## 2019-03-29 DIAGNOSIS — L71.9 ROSACEA: ICD-10-CM

## 2019-03-29 LAB
BASOPHILS # BLD AUTO: 0.03 THOUSANDS/ΜL (ref 0–0.1)
BASOPHILS NFR BLD AUTO: 1 % (ref 0–1)
CHOLEST SERPL-MCNC: 253 MG/DL (ref 50–200)
EOSINOPHIL # BLD AUTO: 0.21 THOUSAND/ΜL (ref 0–0.61)
EOSINOPHIL NFR BLD AUTO: 7 % (ref 0–6)
ERYTHROCYTE [DISTWIDTH] IN BLOOD BY AUTOMATED COUNT: 12.5 % (ref 11.6–15.1)
HCT VFR BLD AUTO: 38.3 % (ref 34.8–46.1)
HDLC SERPL-MCNC: 100 MG/DL (ref 40–60)
HGB BLD-MCNC: 12.5 G/DL (ref 11.5–15.4)
IMM GRANULOCYTES # BLD AUTO: 0.01 THOUSAND/UL (ref 0–0.2)
IMM GRANULOCYTES NFR BLD AUTO: 0 % (ref 0–2)
LDLC SERPL CALC-MCNC: 139 MG/DL (ref 0–100)
LYMPHOCYTES # BLD AUTO: 1.07 THOUSANDS/ΜL (ref 0.6–4.47)
LYMPHOCYTES NFR BLD AUTO: 33 % (ref 14–44)
MCH RBC QN AUTO: 31.9 PG (ref 26.8–34.3)
MCHC RBC AUTO-ENTMCNC: 32.6 G/DL (ref 31.4–37.4)
MCV RBC AUTO: 98 FL (ref 82–98)
MONOCYTES # BLD AUTO: 0.48 THOUSAND/ΜL (ref 0.17–1.22)
MONOCYTES NFR BLD AUTO: 15 % (ref 4–12)
NEUTROPHILS # BLD AUTO: 1.42 THOUSANDS/ΜL (ref 1.85–7.62)
NEUTS SEG NFR BLD AUTO: 44 % (ref 43–75)
NRBC BLD AUTO-RTO: 0 /100 WBCS
PLATELET # BLD AUTO: 251 THOUSANDS/UL (ref 149–390)
PMV BLD AUTO: 10.2 FL (ref 8.9–12.7)
RBC # BLD AUTO: 3.92 MILLION/UL (ref 3.81–5.12)
TRIGL SERPL-MCNC: 70 MG/DL
TSH SERPL DL<=0.05 MIU/L-ACNC: 2.2 UIU/ML (ref 0.36–3.74)
WBC # BLD AUTO: 3.22 THOUSAND/UL (ref 4.31–10.16)

## 2019-03-29 PROCEDURE — 85025 COMPLETE CBC W/AUTO DIFF WBC: CPT

## 2019-03-29 PROCEDURE — 84443 ASSAY THYROID STIM HORMONE: CPT

## 2019-03-29 PROCEDURE — 36415 COLL VENOUS BLD VENIPUNCTURE: CPT

## 2019-03-29 PROCEDURE — 80061 LIPID PANEL: CPT

## 2019-04-02 ENCOUNTER — OFFICE VISIT (OUTPATIENT)
Dept: INTERNAL MEDICINE CLINIC | Facility: CLINIC | Age: 74
End: 2019-04-02
Payer: MEDICARE

## 2019-04-02 VITALS
HEART RATE: 98 BPM | WEIGHT: 143 LBS | HEIGHT: 64 IN | TEMPERATURE: 98 F | BODY MASS INDEX: 24.41 KG/M2 | DIASTOLIC BLOOD PRESSURE: 78 MMHG | SYSTOLIC BLOOD PRESSURE: 142 MMHG | OXYGEN SATURATION: 97 %

## 2019-04-02 DIAGNOSIS — Z84.89 FHX: ALLERGIES: ICD-10-CM

## 2019-04-02 DIAGNOSIS — E55.9 VITAMIN D DEFICIENCY: Primary | ICD-10-CM

## 2019-04-02 DIAGNOSIS — I10 ESSENTIAL HYPERTENSION: ICD-10-CM

## 2019-04-02 DIAGNOSIS — Z23 NEEDS FLU SHOT: ICD-10-CM

## 2019-04-02 DIAGNOSIS — M81.0 OSTEOPOROSIS, UNSPECIFIED OSTEOPOROSIS TYPE, UNSPECIFIED PATHOLOGICAL FRACTURE PRESENCE: ICD-10-CM

## 2019-04-02 PROCEDURE — 99214 OFFICE O/P EST MOD 30 MIN: CPT | Performed by: INTERNAL MEDICINE

## 2019-04-02 RX ORDER — OLMESARTAN MEDOXOMIL 40 MG/1
20 TABLET ORAL DAILY
Qty: 30 TABLET | Refills: 5 | Status: SHIPPED | OUTPATIENT
Start: 2019-04-02 | End: 2019-04-24

## 2019-04-02 RX ORDER — MOMETASONE FUROATE 1 MG/G
CREAM TOPICAL DAILY PRN
Qty: 45 G | Refills: 0 | Status: SHIPPED | OUTPATIENT
Start: 2019-04-02 | End: 2022-05-05 | Stop reason: SDUPTHER

## 2019-04-10 ENCOUNTER — TELEPHONE (OUTPATIENT)
Dept: INTERNAL MEDICINE CLINIC | Facility: CLINIC | Age: 74
End: 2019-04-10

## 2019-04-10 DIAGNOSIS — I10 ESSENTIAL HYPERTENSION: ICD-10-CM

## 2019-04-15 ENCOUNTER — TELEPHONE (OUTPATIENT)
Dept: INTERNAL MEDICINE CLINIC | Facility: CLINIC | Age: 74
End: 2019-04-15

## 2019-04-24 DIAGNOSIS — I10 HYPERTENSION, ESSENTIAL: Primary | ICD-10-CM

## 2019-04-24 RX ORDER — CANDESARTAN 16 MG/1
16 TABLET ORAL DAILY
Qty: 30 TABLET | Refills: 3 | Status: SHIPPED | OUTPATIENT
Start: 2019-04-24 | End: 2019-08-23 | Stop reason: SDUPTHER

## 2019-05-21 DIAGNOSIS — N95.2 ATROPHIC VAGINITIS: ICD-10-CM

## 2019-05-21 RX ORDER — ESTRADIOL 10 UG/1
INSERT VAGINAL
Qty: 24 TABLET | Refills: 3 | Status: SHIPPED | OUTPATIENT
Start: 2019-05-21 | End: 2021-03-23 | Stop reason: HOSPADM

## 2019-08-23 DIAGNOSIS — E01.8 IODINE HYPOTHYROIDISM: ICD-10-CM

## 2019-08-23 DIAGNOSIS — I10 HYPERTENSION, ESSENTIAL: ICD-10-CM

## 2019-08-23 RX ORDER — LEVOTHYROXINE SODIUM 0.05 MG/1
TABLET ORAL
Qty: 36 TABLET | Refills: 5 | Status: SHIPPED | OUTPATIENT
Start: 2019-08-23 | End: 2020-02-26 | Stop reason: SDUPTHER

## 2019-08-23 RX ORDER — CANDESARTAN 16 MG/1
16 TABLET ORAL DAILY
Qty: 30 TABLET | Refills: 3 | Status: SHIPPED | OUTPATIENT
Start: 2019-08-23 | End: 2019-12-27 | Stop reason: SDUPTHER

## 2019-08-23 NOTE — TELEPHONE ENCOUNTER
Pt needs a refill on candesartan (ATACAND) 16 mg tablet  Pt also needs a refill on levothyroxine 50 mcg tablet  [facility] [phone]

## 2019-10-02 ENCOUNTER — TELEPHONE (OUTPATIENT)
Dept: INTERNAL MEDICINE CLINIC | Facility: CLINIC | Age: 74
End: 2019-10-02

## 2019-10-02 NOTE — TELEPHONE ENCOUNTER
She has been on this medication since 4/15/19 when med was first switched  Please review  Thank you!

## 2019-10-02 NOTE — TELEPHONE ENCOUNTER
Patient calling because she has questions about her BP medication  Her medications switched and now she is getting blood spots on her face and her dermatologist seems to think it may be because of the new medication  She would like to know if there is a different medication that she can be put on

## 2019-10-04 NOTE — TELEPHONE ENCOUNTER
Called patient and notified her  I told her you would discuss it further at her next appointment  She states it only happened twice and last about a week  I explained that if it were to occur again from now until the time of her next appointment with you to give us a call back and we can schedule an appointment  This way we can see what it looks like and possibly determine what it is  Patient said ok  Thank you!

## 2019-12-26 DIAGNOSIS — E78.00 HYPERCHOLESTEROLEMIA: ICD-10-CM

## 2019-12-26 DIAGNOSIS — E03.8 OTHER SPECIFIED HYPOTHYROIDISM: ICD-10-CM

## 2019-12-26 DIAGNOSIS — E55.9 VITAMIN D DEFICIENCY: ICD-10-CM

## 2019-12-26 DIAGNOSIS — I10 HYPERTENSION, ESSENTIAL: Primary | ICD-10-CM

## 2019-12-27 DIAGNOSIS — I10 HYPERTENSION, ESSENTIAL: ICD-10-CM

## 2019-12-27 RX ORDER — CANDESARTAN 16 MG/1
16 TABLET ORAL DAILY
Qty: 30 TABLET | Refills: 1 | Status: SHIPPED | OUTPATIENT
Start: 2019-12-27 | End: 2020-02-26 | Stop reason: SDUPTHER

## 2020-02-19 DIAGNOSIS — N95.2 ATROPHIC VAGINITIS: ICD-10-CM

## 2020-02-19 RX ORDER — ESTRADIOL 10 UG/1
INSERT VAGINAL
Qty: 24 TABLET | Refills: 3 | OUTPATIENT
Start: 2020-02-19

## 2020-02-19 NOTE — TELEPHONE ENCOUNTER
Vag estrogen Rx requested  Patient has not been seen in our office since 2016   Needs appt if desires to continue

## 2020-02-26 DIAGNOSIS — E01.8 IODINE HYPOTHYROIDISM: ICD-10-CM

## 2020-02-26 DIAGNOSIS — I10 HYPERTENSION, ESSENTIAL: ICD-10-CM

## 2020-02-26 RX ORDER — CANDESARTAN 16 MG/1
16 TABLET ORAL DAILY
Qty: 90 TABLET | Refills: 0 | Status: SHIPPED | OUTPATIENT
Start: 2020-02-26 | End: 2020-05-13 | Stop reason: SDUPTHER

## 2020-02-26 RX ORDER — LEVOTHYROXINE SODIUM 0.05 MG/1
TABLET ORAL
Qty: 108 TABLET | Refills: 0 | Status: SHIPPED | OUTPATIENT
Start: 2020-02-26 | End: 2020-05-13 | Stop reason: SDUPTHER

## 2020-02-26 NOTE — TELEPHONE ENCOUNTER
Patient has an appt 05/13/2020,  in hospice, needs 2 script sent [facility] 248 Levothyroxune 50mcg #90 and Candesartan 16mgs #90

## 2020-05-08 ENCOUNTER — TELEPHONE (OUTPATIENT)
Dept: INTERNAL MEDICINE CLINIC | Facility: CLINIC | Age: 75
End: 2020-05-08

## 2020-05-13 ENCOUNTER — TELEPHONE (OUTPATIENT)
Dept: INTERNAL MEDICINE CLINIC | Facility: CLINIC | Age: 75
End: 2020-05-13

## 2020-05-13 ENCOUNTER — TELEMEDICINE (OUTPATIENT)
Dept: INTERNAL MEDICINE CLINIC | Facility: CLINIC | Age: 75
End: 2020-05-13
Payer: MEDICARE

## 2020-05-13 VITALS
HEIGHT: 64 IN | SYSTOLIC BLOOD PRESSURE: 122 MMHG | WEIGHT: 140 LBS | BODY MASS INDEX: 23.9 KG/M2 | HEART RATE: 80 BPM | DIASTOLIC BLOOD PRESSURE: 70 MMHG

## 2020-05-13 DIAGNOSIS — E78.00 HYPERCHOLESTEROLEMIA: ICD-10-CM

## 2020-05-13 DIAGNOSIS — E01.8 IODINE HYPOTHYROIDISM: ICD-10-CM

## 2020-05-13 DIAGNOSIS — I10 HYPERTENSION, ESSENTIAL: Primary | ICD-10-CM

## 2020-05-13 DIAGNOSIS — E03.8 OTHER SPECIFIED HYPOTHYROIDISM: ICD-10-CM

## 2020-05-13 DIAGNOSIS — Z78.0 POSTMENOPAUSAL: ICD-10-CM

## 2020-05-13 PROCEDURE — 99443 PR PHYS/QHP TELEPHONE EVALUATION 21-30 MIN: CPT | Performed by: INTERNAL MEDICINE

## 2020-05-13 RX ORDER — LEVOTHYROXINE SODIUM 0.05 MG/1
TABLET ORAL
Qty: 108 TABLET | Refills: 0 | Status: SHIPPED | OUTPATIENT
Start: 2020-05-13 | End: 2020-08-27 | Stop reason: SDUPTHER

## 2020-05-13 RX ORDER — CANDESARTAN 16 MG/1
16 TABLET ORAL DAILY
Qty: 90 TABLET | Refills: 0 | Status: SHIPPED | OUTPATIENT
Start: 2020-05-13 | End: 2020-08-27 | Stop reason: SDUPTHER

## 2020-08-27 DIAGNOSIS — E01.8 IODINE HYPOTHYROIDISM: ICD-10-CM

## 2020-08-27 DIAGNOSIS — I10 HYPERTENSION, ESSENTIAL: ICD-10-CM

## 2020-08-27 NOTE — TELEPHONE ENCOUNTER
Refill   candesartan (ATACAND) 16 mg tablet   levothyroxine 50 mcg tablet    [address], [address]  [address]     Thanks

## 2020-08-28 RX ORDER — CANDESARTAN 16 MG/1
16 TABLET ORAL DAILY
Qty: 90 TABLET | Refills: 0 | Status: SHIPPED | OUTPATIENT
Start: 2020-08-28 | End: 2020-09-30 | Stop reason: SDUPTHER

## 2020-08-28 RX ORDER — LEVOTHYROXINE SODIUM 0.05 MG/1
TABLET ORAL
Qty: 108 TABLET | Refills: 0 | Status: SHIPPED | OUTPATIENT
Start: 2020-08-28 | End: 2020-09-30

## 2020-09-28 ENCOUNTER — LAB (OUTPATIENT)
Dept: LAB | Facility: MEDICAL CENTER | Age: 75
End: 2020-09-28
Payer: MEDICARE

## 2020-09-28 DIAGNOSIS — E78.00 HYPERCHOLESTEROLEMIA: ICD-10-CM

## 2020-09-28 DIAGNOSIS — E55.9 VITAMIN D DEFICIENCY: ICD-10-CM

## 2020-09-28 DIAGNOSIS — I10 HYPERTENSION, ESSENTIAL: ICD-10-CM

## 2020-09-28 DIAGNOSIS — E03.8 OTHER SPECIFIED HYPOTHYROIDISM: ICD-10-CM

## 2020-09-28 LAB
25(OH)D3 SERPL-MCNC: 47.4 NG/ML (ref 30–100)
ALBUMIN SERPL BCP-MCNC: 4 G/DL (ref 3.5–5)
ALP SERPL-CCNC: 72 U/L (ref 46–116)
ALT SERPL W P-5'-P-CCNC: 25 U/L (ref 12–78)
ANION GAP SERPL CALCULATED.3IONS-SCNC: 5 MMOL/L (ref 4–13)
AST SERPL W P-5'-P-CCNC: 23 U/L (ref 5–45)
BASOPHILS # BLD AUTO: 0.05 THOUSANDS/ΜL (ref 0–0.1)
BASOPHILS NFR BLD AUTO: 1 % (ref 0–1)
BILIRUB SERPL-MCNC: 0.56 MG/DL (ref 0.2–1)
BUN SERPL-MCNC: 15 MG/DL (ref 5–25)
CALCIUM SERPL-MCNC: 9.8 MG/DL (ref 8.3–10.1)
CHLORIDE SERPL-SCNC: 109 MMOL/L (ref 100–108)
CHOLEST SERPL-MCNC: 268 MG/DL (ref 50–200)
CO2 SERPL-SCNC: 26 MMOL/L (ref 21–32)
CREAT SERPL-MCNC: 0.9 MG/DL (ref 0.6–1.3)
EOSINOPHIL # BLD AUTO: 0.59 THOUSAND/ΜL (ref 0–0.61)
EOSINOPHIL NFR BLD AUTO: 14 % (ref 0–6)
ERYTHROCYTE [DISTWIDTH] IN BLOOD BY AUTOMATED COUNT: 13.5 % (ref 11.6–15.1)
GFR SERPL CREATININE-BSD FRML MDRD: 62 ML/MIN/1.73SQ M
GLUCOSE P FAST SERPL-MCNC: 82 MG/DL (ref 65–99)
HCT VFR BLD AUTO: 40 % (ref 34.8–46.1)
HDLC SERPL-MCNC: 101 MG/DL
HGB BLD-MCNC: 12.5 G/DL (ref 11.5–15.4)
IMM GRANULOCYTES # BLD AUTO: 0.01 THOUSAND/UL (ref 0–0.2)
IMM GRANULOCYTES NFR BLD AUTO: 0 % (ref 0–2)
LDLC SERPL CALC-MCNC: 154 MG/DL (ref 0–100)
LYMPHOCYTES # BLD AUTO: 1.53 THOUSANDS/ΜL (ref 0.6–4.47)
LYMPHOCYTES NFR BLD AUTO: 36 % (ref 14–44)
MCH RBC QN AUTO: 31.8 PG (ref 26.8–34.3)
MCHC RBC AUTO-ENTMCNC: 31.3 G/DL (ref 31.4–37.4)
MCV RBC AUTO: 102 FL (ref 82–98)
MONOCYTES # BLD AUTO: 0.53 THOUSAND/ΜL (ref 0.17–1.22)
MONOCYTES NFR BLD AUTO: 13 % (ref 4–12)
NEUTROPHILS # BLD AUTO: 1.54 THOUSANDS/ΜL (ref 1.85–7.62)
NEUTS SEG NFR BLD AUTO: 36 % (ref 43–75)
NRBC BLD AUTO-RTO: 0 /100 WBCS
PLATELET # BLD AUTO: 244 THOUSANDS/UL (ref 149–390)
PMV BLD AUTO: 11 FL (ref 8.9–12.7)
POTASSIUM SERPL-SCNC: 4.1 MMOL/L (ref 3.5–5.3)
PROT SERPL-MCNC: 7.3 G/DL (ref 6.4–8.2)
RBC # BLD AUTO: 3.93 MILLION/UL (ref 3.81–5.12)
SODIUM SERPL-SCNC: 140 MMOL/L (ref 136–145)
T4 FREE SERPL-MCNC: 1.27 NG/DL (ref 0.76–1.46)
TRIGL SERPL-MCNC: 67 MG/DL
TSH SERPL DL<=0.05 MIU/L-ACNC: 3.95 UIU/ML (ref 0.36–3.74)
WBC # BLD AUTO: 4.25 THOUSAND/UL (ref 4.31–10.16)

## 2020-09-28 PROCEDURE — 80061 LIPID PANEL: CPT

## 2020-09-28 PROCEDURE — 85025 COMPLETE CBC W/AUTO DIFF WBC: CPT

## 2020-09-28 PROCEDURE — 84439 ASSAY OF FREE THYROXINE: CPT

## 2020-09-28 PROCEDURE — 84443 ASSAY THYROID STIM HORMONE: CPT

## 2020-09-28 PROCEDURE — 80053 COMPREHEN METABOLIC PANEL: CPT

## 2020-09-28 PROCEDURE — 36415 COLL VENOUS BLD VENIPUNCTURE: CPT

## 2020-09-28 PROCEDURE — 82306 VITAMIN D 25 HYDROXY: CPT

## 2020-09-30 ENCOUNTER — TELEMEDICINE (OUTPATIENT)
Dept: INTERNAL MEDICINE CLINIC | Facility: CLINIC | Age: 75
End: 2020-09-30
Payer: MEDICARE

## 2020-09-30 VITALS
SYSTOLIC BLOOD PRESSURE: 122 MMHG | HEIGHT: 64 IN | BODY MASS INDEX: 24.07 KG/M2 | TEMPERATURE: 98 F | WEIGHT: 141 LBS | DIASTOLIC BLOOD PRESSURE: 77 MMHG

## 2020-09-30 DIAGNOSIS — I10 HYPERTENSION, ESSENTIAL: ICD-10-CM

## 2020-09-30 DIAGNOSIS — E01.8 IODINE HYPOTHYROIDISM: ICD-10-CM

## 2020-09-30 DIAGNOSIS — E78.00 HYPERCHOLESTEROLEMIA: Primary | ICD-10-CM

## 2020-09-30 DIAGNOSIS — E78.00 HYPERCHOLESTEROLEMIA: ICD-10-CM

## 2020-09-30 DIAGNOSIS — E03.8 OTHER SPECIFIED HYPOTHYROIDISM: ICD-10-CM

## 2020-09-30 PROCEDURE — G2012 BRIEF CHECK IN BY MD/QHP: HCPCS | Performed by: INTERNAL MEDICINE

## 2020-09-30 RX ORDER — LEVOTHYROXINE SODIUM 0.07 MG/1
TABLET ORAL
Qty: 90 TABLET | Refills: 1 | Status: SHIPPED | OUTPATIENT
Start: 2020-09-30 | End: 2020-09-30 | Stop reason: SDUPTHER

## 2020-09-30 RX ORDER — ROSUVASTATIN CALCIUM 10 MG/1
10 TABLET, COATED ORAL DAILY
Qty: 90 TABLET | Refills: 1 | Status: SHIPPED | OUTPATIENT
Start: 2020-09-30 | End: 2020-10-13 | Stop reason: SDUPTHER

## 2020-09-30 RX ORDER — ROSUVASTATIN CALCIUM 10 MG/1
10 TABLET, COATED ORAL DAILY
Qty: 90 TABLET | Refills: 3 | Status: SHIPPED | OUTPATIENT
Start: 2020-09-30 | End: 2020-09-30 | Stop reason: SDUPTHER

## 2020-09-30 RX ORDER — LEVOTHYROXINE SODIUM 0.07 MG/1
75 TABLET ORAL DAILY
Qty: 90 TABLET | Refills: 1 | Status: SHIPPED | OUTPATIENT
Start: 2020-09-30 | End: 2020-10-13 | Stop reason: SDUPTHER

## 2020-09-30 RX ORDER — CANDESARTAN 16 MG/1
16 TABLET ORAL DAILY
Qty: 90 TABLET | Refills: 1 | Status: SHIPPED | OUTPATIENT
Start: 2020-09-30 | End: 2020-10-13 | Stop reason: SDUPTHER

## 2020-09-30 NOTE — PROGRESS NOTES
After connecting through telephone, the patient was identified by name and date of birth  Allie Kebede was informed that this is a telemedicine visit and that the visit is being conducted through telephone  My office door was closed  No one else was in the room  She acknowledged consent and understanding of privacy and security of the platform  The patient has agreed to participate and understands she can discontinue the visit at any time  Patient is aware this is a billable service  VIRTUAL VISIT DISCLAIMER    Allie Kebede acknowledges that she has consented to an online visit or consultation  She understands that the online visit is based solely on information provided by her, and that, in the absence of a face-to-face physical evaluation by the physician, the diagnosis she receives is both limited and provisional in terms of accuracy and completeness  This is not intended to replace a full medical face-to-face evaluation by the physician  Allie Kebede understands and accepts these terms  Assessment/Plan:    Hypercholesterolemia  Discussed at great length the benefit of Rx of the elevated LDL-C levels  She was reluctant but ultimately sees the benefit of Rx  Hypertension, essential  Well controlled  Continue present management  Other specified hypothyroidism  Will need a slight increase in supplementation  Presently take 75 mcg 3 x week and 50 mcg the other days  Will increase to 75 mcg daily  Iodine hypothyroidism  Will need to increase dose of  Levothyroxine to 75 mcg daily              Diagnoses and all orders for this visit:    Hypercholesterolemia  -     Discontinue: rosuvastatin (CRESTOR) 10 MG tablet; Take 1 tablet (10 mg total) by mouth daily    Other specified hypothyroidism    Other orders  -     Discontinue: levothyroxine 75 mcg tablet;  Take one tab daily          Subjective:   Chief Complaint   Patient presents with    Follow-up     Follow up HTN, vit d def and hypothyroidism  Review labs   Virtual Regular Visit     Phone call only [phone] maintenance     pt is due for a dexa and will schedule after COVID calms down        Patient ID: Tobi Schilling is a 66 y o  female  Hypertension   This is a chronic problem  The current episode started more than 1 year ago  The problem is controlled  Risk factors for coronary artery disease include dyslipidemia and post-menopausal state  Past treatments include angiotensin blockers  The current treatment provides significant improvement  Identifiable causes of hypertension include a thyroid problem  Thyroid Problem   Presents for follow-up visit  The symptoms have been stable (TSH slightly high)  Her past medical history is significant for hyperlipidemia  Hyperlipidemia   This is a chronic problem  The current episode started more than 1 year ago  The problem is uncontrolled  Recent lipid tests were reviewed and are high  Exacerbating diseases include hypothyroidism  The current treatment provides no improvement of lipids  Risk factors for coronary artery disease include post-menopausal and hypertension  The following portions of the patient's history were reviewed and updated as appropriate: past family history, past medical history, past social history, past surgical history and problem list     Review of Systems   All other systems reviewed and are negative          Past Medical History:   Diagnosis Date    Disease of thyroid gland     Osteopenia     last assessed 12/17/13    Vaginal atrophy          Current Outpatient Medications:     Cholecalciferol (VITAMIN D3) 2000 units CHEW, Chew 2 each daily , Disp: , Rfl:     estradiol (YUVAFEM) 10 MCG TABS vaginal tablet, Insert 1 tablet twice per week, Disp: 24 tablet, Rfl: 3    Flaxseed, Linseed, (FLAX SEED OIL) 1300 MG CAPS, Take 1 capsule by mouth daily, Disp: , Rfl:     mometasone (ELOCON) 0 1 % cream, Apply topically daily as needed (allergies), Disp: 45 g, Rfl: 0    Omega-3 Fatty Acids (FISH OIL) 645 MG CAPS, Take 1 capsule by mouth daily, Disp: , Rfl:     candesartan (ATACAND) 16 mg tablet, Take 1 tablet (16 mg total) by mouth daily, Disp: 90 tablet, Rfl: 1    levothyroxine 75 mcg tablet, Take 1 tablet (75 mcg total) by mouth daily Take one tab daily, Disp: 90 tablet, Rfl: 1    rosuvastatin (CRESTOR) 10 MG tablet, Take 1 tablet (10 mg total) by mouth daily, Disp: 90 tablet, Rfl: 1    Allergies   Allergen Reactions    Other      Seasonal allergies        Social History   Past Surgical History:   Procedure Laterality Date    DILATION AND CURETTAGE OF UTERUS      TUBAL LIGATION       Family History   Problem Relation Age of Onset    Lymphoma Mother 79        acute    Hyperlipidemia Father     Peripheral vascular disease Father     Breast cancer Sister     Leukemia Brother     Other Other         4 children living       Objective:  /77 Comment: per pt report  Temp 98 °F (36 7 °C) Comment: per pt report  Ht 5' 4" (1 626 m) Comment: per pt report  Wt 64 kg (141 lb) Comment: per pt report  BMI 24 20 kg/m²     Recent Results (from the past 1344 hour(s))   CBC and differential    Collection Time: 09/28/20  6:38 AM   Result Value Ref Range    WBC 4 25 (L) 4 31 - 10 16 Thousand/uL    RBC 3 93 3 81 - 5 12 Million/uL    Hemoglobin 12 5 11 5 - 15 4 g/dL    Hematocrit 40 0 34 8 - 46 1 %     (H) 82 - 98 fL    MCH 31 8 26 8 - 34 3 pg    MCHC 31 3 (L) 31 4 - 37 4 g/dL    RDW 13 5 11 6 - 15 1 %    MPV 11 0 8 9 - 12 7 fL    Platelets 152 440 - 992 Thousands/uL    nRBC 0 /100 WBCs    Neutrophils Relative 36 (L) 43 - 75 %    Immat GRANS % 0 0 - 2 %    Lymphocytes Relative 36 14 - 44 %    Monocytes Relative 13 (H) 4 - 12 %    Eosinophils Relative 14 (H) 0 - 6 %    Basophils Relative 1 0 - 1 %    Neutrophils Absolute 1 54 (L) 1 85 - 7 62 Thousands/µL    Immature Grans Absolute 0 01 0 00 - 0 20 Thousand/uL    Lymphocytes Absolute 1 53 0 60 - 4 47 Thousands/µL    Monocytes Absolute 0 53 0 17 - 1 22 Thousand/µL    Eosinophils Absolute 0 59 0 00 - 0 61 Thousand/µL    Basophils Absolute 0 05 0 00 - 0 10 Thousands/µL   Comprehensive metabolic panel    Collection Time: 09/28/20  6:38 AM   Result Value Ref Range    Sodium 140 136 - 145 mmol/L    Potassium 4 1 3 5 - 5 3 mmol/L    Chloride 109 (H) 100 - 108 mmol/L    CO2 26 21 - 32 mmol/L    ANION GAP 5 4 - 13 mmol/L    BUN 15 5 - 25 mg/dL    Creatinine 0 90 0 60 - 1 30 mg/dL    Glucose, Fasting 82 65 - 99 mg/dL    Calcium 9 8 8 3 - 10 1 mg/dL    AST 23 5 - 45 U/L    ALT 25 12 - 78 U/L    Alkaline Phosphatase 72 46 - 116 U/L    Total Protein 7 3 6 4 - 8 2 g/dL    Albumin 4 0 3 5 - 5 0 g/dL    Total Bilirubin 0 56 0 20 - 1 00 mg/dL    eGFR 62 ml/min/1 73sq m   Lipid Panel with Direct LDL reflex    Collection Time: 09/28/20  6:38 AM   Result Value Ref Range    Cholesterol 268 (H) 50 - 200 mg/dL    Triglycerides 67 <=150 mg/dL    HDL, Direct 101 >=40 mg/dL    LDL Calculated 154 (H) 0 - 100 mg/dL   Vitamin D 25 hydroxy    Collection Time: 09/28/20  6:38 AM   Result Value Ref Range    Vit D, 25-Hydroxy 47 4 30 0 - 100 0 ng/mL   TSH, 3rd generation with Free T4 reflex    Collection Time: 09/28/20  6:38 AM   Result Value Ref Range    TSH 3RD GENERATON 3 950 (H) 0 358 - 3 740 uIU/mL   T4, free    Collection Time: 09/28/20  6:38 AM   Result Value Ref Range    Free T4 1 27 0 76 - 1 46 ng/dL            Physical Exam

## 2020-09-30 NOTE — TELEPHONE ENCOUNTER
Last visit 9/30/20  Next visit not scheduled    I called Benjamin Gandara and cancelled RX's that were sent today for levothyroxine and rosuvastatin and candesartan that was sent 8/28/20  Please send to [facility]

## 2020-09-30 NOTE — TELEPHONE ENCOUNTER
Patient would like all her medications sent to [facility] instead of [facility]       Levothyroxine  Rosuvastatin  Candesartan

## 2020-10-01 NOTE — PATIENT INSTRUCTIONS
Cholesterol and Your Health   AMBULATORY CARE:   Cholesterol  is a waxy, fat-like substance  Cholesterol is made by your body, but also comes from certain foods you eat  Your body uses cholesterol to make hormones and new cells  Your body also uses cholesterol to protect nerves  Cholesterol comes from foods such as meat and dairy products  Your total cholesterol level is made up by LDL cholesterol, HDL cholesterol, and triglycerides:  · LDL cholesterol  is called bad cholesterol  because it forms plaque in your arteries  As plaque builds up, your arteries become narrow, and less blood flows through  When plaque decreases blood flow to your heart, you may have chest pain  If plaque completely blocks an artery that bring blood to your heart, you may have a heart attack  Plaque can break off and form blood clots  Blood clots may block arteries in your brain and cause a stroke  · HDL cholesterol  is called good cholesterol  because it helps remove LDL cholesterol from your arteries  It does this by attaching to LDL cholesterol and carrying it to your liver  Your liver breaks down LDL cholesterol so your body can get rid of it  High levels of HDL cholesterol can help prevent a heart attack and stroke  Low levels of HDL cholesterol can increase your risk for heart disease, heart attack, and stroke  · Triglycerides  are a type of fat that store energy from foods you eat  High levels of triglycerides also cause plaque buildup  This can increase your risk for a heart attack or stroke  If your triglyceride level is high, your LDL cholesterol level may also be high  How food affects your cholesterol levels:   · Unhealthy fats  increase LDL cholesterol and triglyceride levels in your blood  They are found in foods high in cholesterol, saturated fat, and trans fat:     ¨ Cholesterol  is found in eggs, dairy, and meat  ¨ Saturated fat  is found in butter, cheese, ice cream, whole milk, and coconut oil  Saturated fat is also found in meat, such as sausage, hot dogs, and bologna  ¨ Trans fat  is found in liquid oils and is used in fried and baked foods  Foods that contain trans fats include chips, crackers, muffins, sweet rolls, microwave popcorn, and cookies  · Healthy fats,  also called unsaturated fats, help lower LDL cholesterol and triglyceride levels  Healthy fats include the following:     ¨ Monounsaturated fats  are found in foods such as olive oil, canola oil, avocado, nuts, and olives  ¨ Polyunsaturated fats,  such as omega 3 fats, are found in fish, such as salmon, trout, and tuna  They can also be found in plant foods such as flaxseed, walnuts, and soybeans  Other things that affect your cholesterol levels:   · Smoking cigarettes    · Being overweight or obese     · Drinking large amounts of alcohol    · Not enough exercise or no exercise    · Certain genes passed from your parents to you  What you need to know about having your cholesterol levels checked: Adults 21to 39years of age should have their cholesterol levels checked every 4 to 6 years  Adults 45 years and older should have their cholesterol checked every 1 to 2 years  You may need your cholesterol checked more often, or at a younger age, if you have risk factors for heart disease  You may also need to have your cholesterol checked more often if you have other health conditions, such as diabetes  Blood tests are used to check cholesterol levels  Blood tests measure your levels of triglycerides, LDL cholesterol, and HDL cholesterol  Cholesterol level goals: Your cholesterol level goal may depend on your risk for heart disease  It may also depend on your age and other health conditions  Ask your healthcare provider if the following goals are right for you:  · Your total cholesterol level  should be less than 200 mg/dL  This number may also depend on your HDL and LDL cholesterol goals       · Your LDL cholesterol level  should be less than 130 mg/dL  · Your HDL cholesterol level  should be 60 mg/dL or higher  · Your triglyceride level  should be less than 150 mg/dL  Treatment for high cholesterol:  Treatment for high cholesterol will also decrease your risk of heart disease, heart attack, and stroke  Treatment may include any of the following:  · Medicines  may be given to lower your LDL cholesterol, triglyceride levels, or total cholesterol level  You may need medicines to lower your cholesterol if any of the following is true:     ¨ You have a history of stroke, TIA, unstable angina, or a heart attack    ¨ Your LDL cholesterol level is 190 mg/dL or higher    ¨ You are age 36to 76years of age, have diabetes, and your LDL cholesterol is 70 mg/dL or higher    ¨ You are age 36to 76years of age, have risk factors for heart disease, and your LDL cholesterol is 70 mg/dL or higher    · Lifestyle changes  include changes to your diet, exercise, weight loss, and quitting smoking  It also includes decreasing the amount of alcohol you drink  · Supplements  include fish oil, red yeast rice, and garlic  Fish oil may help lower your triglyceride and LDL cholesterol levels  It may also increase your HDL cholesterol level  Red yeast rice may help decrease your total cholesterol level and LDL cholesterol level  Garlic may help lower your total cholesterol level  Do not take these supplements without talking to your healthcare provider  Nutrition to help lower your cholesterol levels:  A registered dietitian can help you create a healthy eating plan  Read food labels and choose foods low in saturated fat, trans fats, and cholesterol  · Decrease the total amount of fat you eat  Choose lean meats, fat-free or 1% fat milk, and low-fat dairy products, such as yogurt and cheese  Try to limit or avoid red meats  Limit or do not eat fried foods or baked goods such as cookies  · Replace unhealthy fats with healthy fats    Cook foods in olive oil or canola oil  Choose soft margarines that are low in saturated fat and trans fat  Seeds, nuts, and avocados are other examples of healthy fats  · Eat foods with omega-3 fats  Examples include salmon, tuna, mackerel, walnuts, and flaxseed  Eat fish 2 times per week  Children and pregnant women should not eat fish that have high levels of mercury, such as shark, swordfish, and aaron mackerel  · Increase the amount of plant-based foods you eat  Plant-based foods are low in cholesterol and fat  Eating more of these foods may help lower your cholesterol and help you lose weight  Examples of plant-based foods includes fruits, vegetables, legumes, and whole grains  Replace milk that contains dairy with almond, soy, or coconut milk  Eat beans and foods with soy for protein instead of meat  Ask your healthcare provider or dietitian for more information on plant-based foods  · Increase the amount of fiber you eat  High-fiber foods can help lower your LDL cholesterol  You should eat between 20 and 30 grams of fiber each day  Eat at least 5 servings of fruits and vegetables each day  Other examples of high-fiber foods include whole-grain or whole-wheat breads, pastas, or cereals, and brown rice  Eat 3 ounces of whole-grain foods each day  Increase fiber slowly  You may have abdominal discomfort, bloating, and gas if you add fiber to your diet too quickly  Lifestyle changes you can make to help lower your cholesterol levels:   · Maintain a healthy weight  Ask your healthcare provider how much you should weigh  Ask him or her to help you create a weight loss plan if you are overweight  Weight loss can decrease your total cholesterol and triglyceride levels  · Exercise regularly  Exercise can help lower your total cholesterol level and maintain a healthy weight  Exercise can also help increase your HDL cholesterol level   Work with your healthcare provider to create an exercise program that is right for you  Get at least 30 minutes of moderate exercise most days of the week  Examples of exercise include brisk walking, swimming, or biking  · Do not smoke  Nicotine and other chemicals in cigarettes and cigars can damage your lungs, heart, and blood vessels  They can also raise your triglyceride levels  Ask your healthcare provider for information if you currently smoke and need help to quit  E-cigarettes or smokeless tobacco still contain nicotine  Talk to your healthcare provider before you use these products  · Limit or do not drink alcohol  Alcohol can increase your triglyceride levels  Ask your healthcare provider if it is safe for you to drink alcohol  Also ask how much is safe for you to drink each day  © 2017 [facility] Information is for End User's use only and may not be sold, redistributed or otherwise used for commercial purposes  All illustrations and images included in CareNotes® are the copyrighted property of [employer]  or [employer] Granados  The above information is an  only  It is not intended as medical advice for individual conditions or treatments  Talk to your doctor, nurse or pharmacist before following any medical regimen to see if it is safe and effective for you

## 2020-10-01 NOTE — ASSESSMENT & PLAN NOTE
Will need a slight increase in supplementation  Presently take 75 mcg 3 x week and 50 mcg the other days  Will increase to 75 mcg daily

## 2020-10-01 NOTE — ASSESSMENT & PLAN NOTE
Discussed at great length the benefit of Rx of the elevated LDL-C levels  She was reluctant but ultimately sees the benefit of Rx

## 2020-10-12 ENCOUNTER — TELEPHONE (OUTPATIENT)
Dept: INTERNAL MEDICINE CLINIC | Facility: CLINIC | Age: 75
End: 2020-10-12

## 2020-10-12 DIAGNOSIS — I10 HYPERTENSION, ESSENTIAL: ICD-10-CM

## 2020-10-12 DIAGNOSIS — E01.8 IODINE HYPOTHYROIDISM: ICD-10-CM

## 2020-10-12 DIAGNOSIS — E78.00 HYPERCHOLESTEROLEMIA: ICD-10-CM

## 2020-10-13 RX ORDER — CANDESARTAN 16 MG/1
16 TABLET ORAL DAILY
Qty: 90 TABLET | Refills: 1 | Status: SHIPPED | OUTPATIENT
Start: 2020-10-13 | End: 2021-03-26 | Stop reason: SDUPTHER

## 2020-10-13 RX ORDER — LEVOTHYROXINE SODIUM 0.07 MG/1
75 TABLET ORAL DAILY
Qty: 90 TABLET | Refills: 1 | Status: SHIPPED | OUTPATIENT
Start: 2020-10-13 | End: 2021-03-10

## 2020-10-13 RX ORDER — ROSUVASTATIN CALCIUM 10 MG/1
10 TABLET, COATED ORAL DAILY
Qty: 90 TABLET | Refills: 1 | Status: SHIPPED | OUTPATIENT
Start: 2020-10-13 | End: 2021-03-23 | Stop reason: HOSPADM

## 2021-01-19 ENCOUNTER — IMMUNIZATIONS (OUTPATIENT)
Dept: FAMILY MEDICINE CLINIC | Facility: HOSPITAL | Age: 76
End: 2021-01-19

## 2021-01-19 DIAGNOSIS — Z23 ENCOUNTER FOR IMMUNIZATION: Primary | ICD-10-CM

## 2021-01-19 PROCEDURE — 0001A SARS-COV-2 / COVID-19 MRNA VACCINE (PFIZER-BIONTECH) 30 MCG: CPT

## 2021-01-19 PROCEDURE — 91300 SARS-COV-2 / COVID-19 MRNA VACCINE (PFIZER-BIONTECH) 30 MCG: CPT

## 2021-02-08 ENCOUNTER — IMMUNIZATIONS (OUTPATIENT)
Dept: FAMILY MEDICINE CLINIC | Facility: HOSPITAL | Age: 76
End: 2021-02-08

## 2021-02-08 DIAGNOSIS — Z23 ENCOUNTER FOR IMMUNIZATION: Primary | ICD-10-CM

## 2021-02-08 PROCEDURE — 0002A SARS-COV-2 / COVID-19 MRNA VACCINE (PFIZER-BIONTECH) 30 MCG: CPT

## 2021-02-08 PROCEDURE — 91300 SARS-COV-2 / COVID-19 MRNA VACCINE (PFIZER-BIONTECH) 30 MCG: CPT

## 2021-03-09 DIAGNOSIS — E01.8 IODINE HYPOTHYROIDISM: ICD-10-CM

## 2021-03-10 RX ORDER — LEVOTHYROXINE SODIUM 75 MCG
TABLET ORAL
Qty: 90 TABLET | Refills: 1 | Status: SHIPPED | OUTPATIENT
Start: 2021-03-10 | End: 2021-06-30 | Stop reason: SDUPTHER

## 2021-03-22 ENCOUNTER — HOSPITAL ENCOUNTER (INPATIENT)
Facility: HOSPITAL | Age: 76
LOS: 1 days | Discharge: HOME | DRG: 315 | End: 2021-03-23
Attending: EMERGENCY MEDICINE | Admitting: INTERNAL MEDICINE
Payer: MEDICARE

## 2021-03-22 ENCOUNTER — APPOINTMENT (EMERGENCY)
Dept: RADIOLOGY | Facility: HOSPITAL | Age: 76
DRG: 315 | End: 2021-03-22
Payer: MEDICARE

## 2021-03-22 ENCOUNTER — APPOINTMENT (EMERGENCY)
Dept: CT IMAGING | Facility: HOSPITAL | Age: 76
DRG: 315 | End: 2021-03-22
Payer: MEDICARE

## 2021-03-22 DIAGNOSIS — R55 SYNCOPE: Primary | ICD-10-CM

## 2021-03-22 DIAGNOSIS — R11.2 NON-INTRACTABLE VOMITING WITH NAUSEA, UNSPECIFIED VOMITING TYPE: ICD-10-CM

## 2021-03-22 PROBLEM — N17.9 AKI (ACUTE KIDNEY INJURY) (HCC): Status: ACTIVE | Noted: 2021-03-22

## 2021-03-22 LAB
ALBUMIN SERPL BCP-MCNC: 3.4 G/DL (ref 3.5–5)
ALP SERPL-CCNC: 71 U/L (ref 46–116)
ALT SERPL W P-5'-P-CCNC: 29 U/L (ref 12–78)
ANION GAP SERPL CALCULATED.3IONS-SCNC: 12 MMOL/L (ref 4–13)
AST SERPL W P-5'-P-CCNC: 19 U/L (ref 5–45)
BASOPHILS # BLD AUTO: 0.05 THOUSANDS/ΜL (ref 0–0.1)
BASOPHILS NFR BLD AUTO: 1 % (ref 0–1)
BILIRUB SERPL-MCNC: 0.43 MG/DL (ref 0.2–1)
BILIRUB UR QL STRIP: NEGATIVE
BUN SERPL-MCNC: 17 MG/DL (ref 5–25)
CALCIUM ALBUM COR SERPL-MCNC: 9.5 MG/DL (ref 8.3–10.1)
CALCIUM SERPL-MCNC: 9 MG/DL (ref 8.3–10.1)
CHLORIDE SERPL-SCNC: 104 MMOL/L (ref 100–108)
CLARITY UR: CLEAR
CO2 SERPL-SCNC: 25 MMOL/L (ref 21–32)
COLOR UR: YELLOW
CREAT SERPL-MCNC: 1.25 MG/DL (ref 0.6–1.3)
EOSINOPHIL # BLD AUTO: 0.24 THOUSAND/ΜL (ref 0–0.61)
EOSINOPHIL NFR BLD AUTO: 5 % (ref 0–6)
ERYTHROCYTE [DISTWIDTH] IN BLOOD BY AUTOMATED COUNT: 13.2 % (ref 11.6–15.1)
GFR SERPL CREATININE-BSD FRML MDRD: 42 ML/MIN/1.73SQ M
GLUCOSE SERPL-MCNC: 158 MG/DL (ref 65–140)
GLUCOSE UR STRIP-MCNC: NEGATIVE MG/DL
HCT VFR BLD AUTO: 36.6 % (ref 34.8–46.1)
HGB BLD-MCNC: 11.7 G/DL (ref 11.5–15.4)
HGB UR QL STRIP.AUTO: NEGATIVE
IMM GRANULOCYTES # BLD AUTO: 0.01 THOUSAND/UL (ref 0–0.2)
IMM GRANULOCYTES NFR BLD AUTO: 0 % (ref 0–2)
KETONES UR STRIP-MCNC: NEGATIVE MG/DL
LACTATE SERPL-SCNC: 0.8 MMOL/L (ref 0.5–2)
LEUKOCYTE ESTERASE UR QL STRIP: NEGATIVE
LIPASE SERPL-CCNC: 111 U/L (ref 73–393)
LYMPHOCYTES # BLD AUTO: 1.24 THOUSANDS/ΜL (ref 0.6–4.47)
LYMPHOCYTES NFR BLD AUTO: 24 % (ref 14–44)
MAGNESIUM SERPL-MCNC: 1.9 MG/DL (ref 1.6–2.6)
MCH RBC QN AUTO: 31.8 PG (ref 26.8–34.3)
MCHC RBC AUTO-ENTMCNC: 32 G/DL (ref 31.4–37.4)
MCV RBC AUTO: 100 FL (ref 82–98)
MONOCYTES # BLD AUTO: 0.46 THOUSAND/ΜL (ref 0.17–1.22)
MONOCYTES NFR BLD AUTO: 9 % (ref 4–12)
NEUTROPHILS # BLD AUTO: 3.1 THOUSANDS/ΜL (ref 1.85–7.62)
NEUTS SEG NFR BLD AUTO: 61 % (ref 43–75)
NITRITE UR QL STRIP: NEGATIVE
NRBC BLD AUTO-RTO: 0 /100 WBCS
PH UR STRIP.AUTO: 7 [PH]
PHOSPHATE SERPL-MCNC: 4 MG/DL (ref 2.3–4.1)
PLATELET # BLD AUTO: 254 THOUSANDS/UL (ref 149–390)
PMV BLD AUTO: 9.9 FL (ref 8.9–12.7)
POTASSIUM SERPL-SCNC: 3.7 MMOL/L (ref 3.5–5.3)
PROT SERPL-MCNC: 6.5 G/DL (ref 6.4–8.2)
PROT UR STRIP-MCNC: NEGATIVE MG/DL
RBC # BLD AUTO: 3.68 MILLION/UL (ref 3.81–5.12)
SODIUM SERPL-SCNC: 141 MMOL/L (ref 136–145)
SP GR UR STRIP.AUTO: <=1.005 (ref 1–1.03)
TROPONIN I SERPL-MCNC: <0.02 NG/ML
TROPONIN I SERPL-MCNC: <0.02 NG/ML
TSH SERPL DL<=0.05 MIU/L-ACNC: 3.85 UIU/ML (ref 0.36–3.74)
UROBILINOGEN UR QL STRIP.AUTO: 0.2 E.U./DL
WBC # BLD AUTO: 5.1 THOUSAND/UL (ref 4.31–10.16)

## 2021-03-22 PROCEDURE — 83690 ASSAY OF LIPASE: CPT | Performed by: EMERGENCY MEDICINE

## 2021-03-22 PROCEDURE — 84484 ASSAY OF TROPONIN QUANT: CPT | Performed by: EMERGENCY MEDICINE

## 2021-03-22 PROCEDURE — 81003 URINALYSIS AUTO W/O SCOPE: CPT | Performed by: INTERNAL MEDICINE

## 2021-03-22 PROCEDURE — 71045 X-RAY EXAM CHEST 1 VIEW: CPT

## 2021-03-22 PROCEDURE — G1004 CDSM NDSC: HCPCS

## 2021-03-22 PROCEDURE — 83605 ASSAY OF LACTIC ACID: CPT | Performed by: EMERGENCY MEDICINE

## 2021-03-22 PROCEDURE — 85025 COMPLETE CBC W/AUTO DIFF WBC: CPT | Performed by: EMERGENCY MEDICINE

## 2021-03-22 PROCEDURE — 74177 CT ABD & PELVIS W/CONTRAST: CPT

## 2021-03-22 PROCEDURE — 80053 COMPREHEN METABOLIC PANEL: CPT | Performed by: EMERGENCY MEDICINE

## 2021-03-22 PROCEDURE — 1123F ACP DISCUSS/DSCN MKR DOCD: CPT | Performed by: EMERGENCY MEDICINE

## 2021-03-22 PROCEDURE — 83735 ASSAY OF MAGNESIUM: CPT | Performed by: EMERGENCY MEDICINE

## 2021-03-22 PROCEDURE — 96374 THER/PROPH/DIAG INJ IV PUSH: CPT

## 2021-03-22 PROCEDURE — 84100 ASSAY OF PHOSPHORUS: CPT | Performed by: EMERGENCY MEDICINE

## 2021-03-22 PROCEDURE — 36415 COLL VENOUS BLD VENIPUNCTURE: CPT | Performed by: EMERGENCY MEDICINE

## 2021-03-22 PROCEDURE — 96360 HYDRATION IV INFUSION INIT: CPT

## 2021-03-22 PROCEDURE — 84484 ASSAY OF TROPONIN QUANT: CPT | Performed by: INTERNAL MEDICINE

## 2021-03-22 PROCEDURE — 99284 EMERGENCY DEPT VISIT MOD MDM: CPT | Performed by: EMERGENCY MEDICINE

## 2021-03-22 PROCEDURE — 99285 EMERGENCY DEPT VISIT HI MDM: CPT

## 2021-03-22 PROCEDURE — 87040 BLOOD CULTURE FOR BACTERIA: CPT | Performed by: EMERGENCY MEDICINE

## 2021-03-22 PROCEDURE — 84145 PROCALCITONIN (PCT): CPT | Performed by: EMERGENCY MEDICINE

## 2021-03-22 PROCEDURE — 93005 ELECTROCARDIOGRAM TRACING: CPT

## 2021-03-22 PROCEDURE — 96361 HYDRATE IV INFUSION ADD-ON: CPT

## 2021-03-22 PROCEDURE — 84443 ASSAY THYROID STIM HORMONE: CPT | Performed by: EMERGENCY MEDICINE

## 2021-03-22 RX ORDER — ONDANSETRON 2 MG/ML
4 INJECTION INTRAMUSCULAR; INTRAVENOUS ONCE
Status: COMPLETED | OUTPATIENT
Start: 2021-03-22 | End: 2021-03-22

## 2021-03-22 RX ORDER — LEVOTHYROXINE SODIUM 0.07 MG/1
75 TABLET ORAL
Status: DISCONTINUED | OUTPATIENT
Start: 2021-03-23 | End: 2021-03-23 | Stop reason: HOSPADM

## 2021-03-22 RX ADMIN — ONDANSETRON 4 MG: 2 INJECTION INTRAMUSCULAR; INTRAVENOUS at 19:39

## 2021-03-22 RX ADMIN — SODIUM CHLORIDE 1000 ML: 0.9 INJECTION, SOLUTION INTRAVENOUS at 19:19

## 2021-03-22 RX ADMIN — SODIUM CHLORIDE 1000 ML: 0.9 INJECTION, SOLUTION INTRAVENOUS at 19:32

## 2021-03-22 RX ADMIN — IOHEXOL 100 ML: 350 INJECTION, SOLUTION INTRAVENOUS at 19:51

## 2021-03-22 NOTE — ED PROVIDER NOTES
History  Chief Complaint   Patient presents with    Syncope     Patient arrives to ED via EMS with report of feeling dizzy after making supper and sitting down on couch  Per EMS was in and out of conciousness with BP of 70/30  Currently Awake and oriented x4     HPI     64-year-old female presenting to the emergency department with syncope  Past medical history is significant for hypertension  Patient was preparing dinner when all of a sudden she felt flushed, lightheaded, diaphoretic and sat down on the couch  Patient lost consciousness, witnessed  EMS was called where she was found to have a blood pressure 70s/30s  EMS stated that patient seemed to go in and out of consciousness while they were there  Fingerstick glucose was in the 100s  Full history is unable to be obtained as patient is acutely ill and has began to have significant vomiting during examination  Patient denies recent fever  Prior to this, patient was in her usual state of health  Denies chest pain, shortness of breath, abdominal pain  Prior to Admission Medications   Prescriptions Last Dose Informant Patient Reported? Taking?    Cholecalciferol (VITAMIN D3) 2000 units CHEW 3/22/2021 at Unknown time Self Yes Yes   Sig: Chew 2 each daily    Flaxseed, Linseed, (FLAX SEED OIL) 1300 MG CAPS 3/22/2021 at Unknown time Self Yes Yes   Sig: Take 1 capsule by mouth daily   Omega-3 Fatty Acids (FISH OIL) 645 MG CAPS 3/22/2021 at Unknown time Self Yes Yes   Sig: Take 1 capsule by mouth daily   Synthroid 75 MCG tablet 3/22/2021 at Unknown time  No Yes   Sig: TAKE 1 TABLET DAILY   candesartan (ATACAND) 16 mg tablet 3/22/2021 at Unknown time  No Yes   Sig: Take 1 tablet (16 mg total) by mouth daily   estradiol (YUVAFEM) 10 MCG TABS vaginal tablet Not Taking at Unknown time Self No No   Sig: Insert 1 tablet twice per week   Patient not taking: Reported on 3/22/2021   mometasone (ELOCON) 0 1 % cream 3/22/2021 at Unknown time Self No Yes   Sig: Apply topically daily as needed (allergies)   rosuvastatin (CRESTOR) 10 MG tablet Not Taking at Unknown time  No No   Sig: Take 1 tablet (10 mg total) by mouth daily   Patient not taking: Reported on 3/22/2021      Facility-Administered Medications: None       Past Medical History:   Diagnosis Date    Disease of thyroid gland     Osteopenia     last assessed 13    Vaginal atrophy        Past Surgical History:   Procedure Laterality Date    DILATION AND CURETTAGE OF UTERUS      TUBAL LIGATION         Family History   Problem Relation Age of Onset    Lymphoma Mother 79        acute    Hyperlipidemia Father     Peripheral vascular disease Father     Breast cancer Sister     Leukemia Brother     Other Other         4 children living     I have reviewed and agree with the history as documented  E-Cigarette/Vaping    E-Cigarette Use Never User      E-Cigarette/Vaping Substances    Nicotine No     THC No     CBD No     Flavoring No     Other No     Unknown No      Social History     Tobacco Use    Smoking status: Former Smoker     Packs/day: 0 25     Years: 8 00     Pack years: 2 00     Types: Cigarettes     Quit date:      Years since quittin 2    Smokeless tobacco: Never Used    Tobacco comment: about 45 years ago    Substance Use Topics    Alcohol use: Yes     Frequency: Monthly or less     Comment: rare     Drug use: No       Review of Systems   Constitutional: Positive for chills, diaphoresis and fatigue  Negative for fever  Respiratory: Negative for apnea, cough, choking, chest tightness, shortness of breath, wheezing and stridor  Cardiovascular: Negative for chest pain and leg swelling  Gastrointestinal: Positive for nausea and vomiting  Negative for abdominal distention, abdominal pain, constipation, diarrhea and rectal pain  Genitourinary: Negative for dysuria and hematuria  Musculoskeletal: Negative for back pain, gait problem and neck pain     Skin: Negative for color change, pallor, rash and wound  Neurological: Positive for dizziness, syncope and light-headedness  Negative for tremors, seizures, facial asymmetry, speech difficulty, weakness, numbness and headaches  Physical Exam  Physical Exam  Vitals signs and nursing note reviewed  Constitutional:       General: She is in acute distress  Appearance: She is well-developed  She is ill-appearing and toxic-appearing  She is not diaphoretic  HENT:      Head: Normocephalic and atraumatic  Right Ear: External ear normal       Left Ear: External ear normal       Nose: Nose normal       Mouth/Throat:      Comments: Patient actively vomiting during examination, darkish brown  Eyes:      General:         Right eye: No discharge  Left eye: No discharge  Extraocular Movements: Extraocular movements intact  Conjunctiva/sclera: Conjunctivae normal       Pupils: Pupils are equal, round, and reactive to light  Neck:      Musculoskeletal: Normal range of motion and neck supple  Cardiovascular:      Rate and Rhythm: Normal rate and regular rhythm  Heart sounds: Normal heart sounds  No murmur  No friction rub  No gallop  Pulmonary:      Effort: Pulmonary effort is normal  No respiratory distress  Breath sounds: Normal breath sounds  No stridor  No wheezing, rhonchi or rales  Chest:      Chest wall: No tenderness  Abdominal:      General: Bowel sounds are normal  There is no distension  Palpations: Abdomen is soft  There is no mass  Tenderness: There is no abdominal tenderness  There is no guarding or rebound  Hernia: No hernia is present  Musculoskeletal: Normal range of motion  General: No tenderness, deformity or signs of injury  Skin:     General: Skin is warm and dry  Capillary Refill: Capillary refill takes less than 2 seconds  Coloration: Skin is pale  Neurological:      Mental Status: She is alert and oriented to person, place, and time  Vital Signs  ED Triage Vitals [03/22/21 1909]   Temperature Pulse Respirations Blood Pressure SpO2   98 1 °F (36 7 °C) 97 22 98/56 96 %      Temp Source Heart Rate Source Patient Position - Orthostatic VS BP Location FiO2 (%)   Oral Monitor Sitting Right arm --      Pain Score       --           Vitals:    03/22/21 1909 03/22/21 1945 03/22/21 2000 03/22/21 2015   BP: 98/56 97/56 107/55 109/54   Pulse: 97 76 84 84   Patient Position - Orthostatic VS: Sitting Lying Lying          Visual Acuity      ED Medications  Medications   ondansetron (ZOFRAN) injection 4 mg (4 mg Intravenous Given 3/22/21 1939)   sodium chloride 0 9 % bolus 1,000 mL (1,000 mL Intravenous New Bag 3/22/21 1932)   sodium chloride 0 9 % bolus 1,000 mL (1,000 mL Intravenous New Bag 3/22/21 1919)   iohexol (OMNIPAQUE) 350 MG/ML injection (MULTI-DOSE) 100 mL (100 mL Intravenous Given 3/22/21 1951)       Diagnostic Studies  Results Reviewed     Procedure Component Value Units Date/Time    Lactic acid, plasma [[id number]] Collected: 03/22/21 2023    Lab Status: In process Specimen: Blood from Arm, Right Updated: 03/22/21 2027    Procalcitonin with AM Reflex [[id number]] Collected: 03/22/21 2023    Lab Status: In process Specimen: Blood from Arm, Right Updated: 03/22/21 2027    TSH [[id number]]  (Abnormal) Collected: 03/22/21 1917    Lab Status: Final result Specimen: Blood from Arm, Right Updated: 03/22/21 1951     TSH 3RD GENERATON 3 850 uIU/mL     Narrative:      Patients undergoing fluorescein dye angiography may retain small amounts of fluorescein in the body for 48-72 hours post procedure  Samples containing fluorescein can produce falsely depressed TSH values  If the patient had this procedure,a specimen should be resubmitted post fluorescein clearance        Lipase [[id number]]  (Normal) Collected: 03/22/21 1917    Lab Status: Final result Specimen: Blood from Arm, Right Updated: 03/22/21 1951     Lipase 111 u/L     Phosphorus [[id number]] (Normal) Collected: 03/22/21 1917    Lab Status: Final result Specimen: Blood from Arm, Right Updated: 03/22/21 1951     Phosphorus 4 0 mg/dL     Magnesium [[id number]]  (Normal) Collected: 03/22/21 1917    Lab Status: Final result Specimen: Blood from Arm, Right Updated: 03/22/21 1951     Magnesium 1 9 mg/dL     Troponin I [[id number]]  (Normal) Collected: 03/22/21 1917    Lab Status: Final result Specimen: Blood from Arm, Right Updated: 03/22/21 1947     Troponin I <0 02 ng/mL     Comprehensive metabolic panel [[id number]]  (Abnormal) Collected: 03/22/21 1917    Lab Status: Final result Specimen: Blood from Arm, Right Updated: 03/22/21 1944     Sodium 141 mmol/L      Potassium 3 7 mmol/L      Chloride 104 mmol/L      CO2 25 mmol/L      ANION GAP 12 mmol/L      BUN 17 mg/dL      Creatinine 1 25 mg/dL      Glucose 158 mg/dL      Calcium 9 0 mg/dL      Corrected Calcium 9 5 mg/dL      AST 19 U/L      ALT 29 U/L      Alkaline Phosphatase 71 U/L      Total Protein 6 5 g/dL      Albumin 3 4 g/dL      Total Bilirubin 0 43 mg/dL      eGFR 42 ml/min/1 73sq m     Narrative:      [place] guidelines for Chronic Kidney Disease (CKD):     Stage 1 with normal or high GFR (GFR > 90 mL/min/1 73 square meters)    Stage 2 Mild CKD (GFR = 60-89 mL/min/1 73 square meters)    Stage 3A Moderate CKD (GFR = 45-59 mL/min/1 73 square meters)    Stage 3B Moderate CKD (GFR = 30-44 mL/min/1 73 square meters)    Stage 4 Severe CKD (GFR = 15-29 mL/min/1 73 square meters)    Stage 5 End Stage CKD (GFR <15 mL/min/1 73 square meters)  Note: GFR calculation is accurate only with a steady state creatinine    Blood culture #2 [[id number]] Collected: 03/22/21 1932    Lab Status:  In process Specimen: Blood from Arm, Right Updated: 03/1943    CBC and differential [[id number]]  (Abnormal) Collected: 03/22/21 1917    Lab Status: Final result Specimen: Blood from Arm, Right Updated: 03/22/21 1928     WBC 5 10 Thousand/uL RBC 3 68 Million/uL      Hemoglobin 11 7 g/dL      Hematocrit 36 6 %       fL      MCH 31 8 pg      MCHC 32 0 g/dL      RDW 13 2 %      MPV 9 9 fL      Platelets 871 Thousands/uL      nRBC 0 /100 WBCs      Neutrophils Relative 61 %      Immat GRANS % 0 %      Lymphocytes Relative 24 %      Monocytes Relative 9 %      Eosinophils Relative 5 %      Basophils Relative 1 %      Neutrophils Absolute 3 10 Thousands/µL      Immature Grans Absolute 0 01 Thousand/uL      Lymphocytes Absolute 1 24 Thousands/µL      Monocytes Absolute 0 46 Thousand/µL      Eosinophils Absolute 0 24 Thousand/µL      Basophils Absolute 0 05 Thousands/µL     Blood culture #1 [[id number]] Collected: 03/22/21 1921    Lab Status: In process Specimen: Blood from Arm, Right Updated: 03/22/21 1924    UA w Reflex to Microscopic w Reflex to Culture [[id number]]     Lab Status: No result Specimen: Urine                  CT abdomen pelvis with contrast   Final Result by Delia Posada MD (03/22 2005)      Potential circumferential bladder wall thickening and mild surrounding fat stranding; limited assessment as the bladder is only partially distended  Please assess for cystitis  The study was marked in [facility] for immediate notification  Workstation performed: [id number]         XR chest 1 view portable    (Results Pending)              Procedures  Procedures         ED Course  ED Course as of Mar 22 2036   Mon Mar 22, 2021   1916 Patient seen immediately upon arrival due to acute clinical condition  53-year-old female brought into emergency department for syncopal episode  Vital signs upon arrival notable for hypotension 90/50  Patient is unable to fully provide history, is sleepy but arousable and has began to actively vomit during examination  Physical exam is otherwise grossly unremarkable  Patient has no abdominal tenderness  Lungs clear to auscultation    Of note, at bedside patient's heart rate is between 40-50 prior to patient having intractable vomiting  Differential diagnosis includes cardiac arrhythmia, ACS, bowel obstruction, septic shock, hypothyroidism, electrolyte imbalance, dehydration, anemia  Lab work and imaging ordered  Normal saline bolus is ordered  4 mg IV Zofran ordered  1922 Vent  rate 60 BPM  NE interval 130 ms  QRS duration 78 ms  QT/QTc 440/440 ms  No ST elevation or depression, no ectopy, no terminal R  Overall impression:  No acute ischemia  ECG 12 lead   2001 CBC, CMP, troponin, magnesium, phosphorus, lipase grossly unremarkable yet  2003 After fluid bolus, patient's blood pressure has improved  2004 Blood Pressure: 107/55   2004 Pulse: 84   2004 No acute findings  XR chest 1 view portable   2014 Potential circumferential bladder wall thickening and mild surrounding fat stranding; limited assessment as the bladder is only partially distended  Please assess for cystitis  CT abdomen pelvis with contrast   2023 Upon reassessment, patient is much improved  Patient is less clammy, pale  No longer vomiting  Blood pressure is within normal limits  Patient denies having any urinary symptoms  2033 Patient admitted to General Medicine for syncope  Family and patient were amenable to this                                                MDM    Disposition  Final diagnoses:   Syncope   Non-intractable vomiting with nausea, unspecified vomiting type     Time reflects when diagnosis was documented in both MDM as applicable and the Disposition within this note     Time User Action Codes Description Comment    3/22/2021  8:33 PM Breanne Pinch Add [R55] Syncope     3/22/2021  8:33 PM Breanne Pinch Add [R11 2] Non-intractable vomiting with nausea, unspecified vomiting type       ED Disposition     ED Disposition Condition Date/Time Comment    Admit Stable Mon Mar 22, 2021  8:33 PM Case was discussed with resident and the patient's admission status was agreed to be Admission Status: inpatient status to the service of Dr Valerie Kee   Follow-up Information    None         Patient's Medications   Discharge Prescriptions    No medications on file     No discharge procedures on file      PDMP Review     None          ED Provider  Electronically Signed by           Olga Lidia Palomo MD  03/22/21 2036

## 2021-03-23 ENCOUNTER — APPOINTMENT (INPATIENT)
Dept: NON INVASIVE DIAGNOSTICS | Facility: HOSPITAL | Age: 76
DRG: 315 | End: 2021-03-23
Payer: MEDICARE

## 2021-03-23 VITALS
RESPIRATION RATE: 20 BRPM | TEMPERATURE: 98.1 F | HEART RATE: 78 BPM | SYSTOLIC BLOOD PRESSURE: 129 MMHG | DIASTOLIC BLOOD PRESSURE: 104 MMHG | OXYGEN SATURATION: 97 % | BODY MASS INDEX: 27.25 KG/M2 | WEIGHT: 158.73 LBS

## 2021-03-23 LAB
ANION GAP SERPL CALCULATED.3IONS-SCNC: 7 MMOL/L (ref 4–13)
BUN SERPL-MCNC: 10 MG/DL (ref 5–25)
CALCIUM SERPL-MCNC: 8.4 MG/DL (ref 8.3–10.1)
CHLORIDE SERPL-SCNC: 110 MMOL/L (ref 100–108)
CO2 SERPL-SCNC: 24 MMOL/L (ref 21–32)
CREAT SERPL-MCNC: 0.87 MG/DL (ref 0.6–1.3)
ERYTHROCYTE [DISTWIDTH] IN BLOOD BY AUTOMATED COUNT: 13.1 % (ref 11.6–15.1)
GFR SERPL CREATININE-BSD FRML MDRD: 64 ML/MIN/1.73SQ M
GLUCOSE SERPL-MCNC: 99 MG/DL (ref 65–140)
HCT VFR BLD AUTO: 32.2 % (ref 34.8–46.1)
HGB BLD-MCNC: 10.4 G/DL (ref 11.5–15.4)
MCH RBC QN AUTO: 31.7 PG (ref 26.8–34.3)
MCHC RBC AUTO-ENTMCNC: 32.3 G/DL (ref 31.4–37.4)
MCV RBC AUTO: 98 FL (ref 82–98)
PLATELET # BLD AUTO: 215 THOUSANDS/UL (ref 149–390)
PMV BLD AUTO: 9.7 FL (ref 8.9–12.7)
POTASSIUM SERPL-SCNC: 4 MMOL/L (ref 3.5–5.3)
PROCALCITONIN SERPL-MCNC: <0.05 NG/ML
PROCALCITONIN SERPL-MCNC: <0.05 NG/ML
RBC # BLD AUTO: 3.28 MILLION/UL (ref 3.81–5.12)
SODIUM SERPL-SCNC: 141 MMOL/L (ref 136–145)
WBC # BLD AUTO: 6.23 THOUSAND/UL (ref 4.31–10.16)

## 2021-03-23 PROCEDURE — 36415 COLL VENOUS BLD VENIPUNCTURE: CPT | Performed by: EMERGENCY MEDICINE

## 2021-03-23 PROCEDURE — 93306 TTE W/DOPPLER COMPLETE: CPT | Performed by: INTERNAL MEDICINE

## 2021-03-23 PROCEDURE — 80048 BASIC METABOLIC PNL TOTAL CA: CPT | Performed by: INTERNAL MEDICINE

## 2021-03-23 PROCEDURE — 93306 TTE W/DOPPLER COMPLETE: CPT

## 2021-03-23 PROCEDURE — 84145 PROCALCITONIN (PCT): CPT | Performed by: EMERGENCY MEDICINE

## 2021-03-23 PROCEDURE — 99239 HOSP IP/OBS DSCHRG MGMT >30: CPT | Performed by: HOSPITALIST

## 2021-03-23 PROCEDURE — 85027 COMPLETE CBC AUTOMATED: CPT | Performed by: INTERNAL MEDICINE

## 2021-03-23 RX ORDER — TRIAMCINOLONE ACETONIDE 1 MG/G
CREAM TOPICAL ONCE AS NEEDED
Status: DISCONTINUED | OUTPATIENT
Start: 2021-03-23 | End: 2021-03-23 | Stop reason: HOSPADM

## 2021-03-23 RX ADMIN — ENOXAPARIN SODIUM 40 MG: 40 INJECTION SUBCUTANEOUS at 12:13

## 2021-03-23 RX ADMIN — LEVOTHYROXINE SODIUM 75 MCG: 75 TABLET ORAL at 06:59

## 2021-03-23 NOTE — ASSESSMENT & PLAN NOTE
TSH 3 85  Continue levothyroxine 75 mg daily, this was recently increased  Vitals reviewed:  Blood pressure improved  None tachycardic  Afebrile

## 2021-03-23 NOTE — H&P
[address] [facility] 1945, 66 y o  female MRN: [id number]  Unit/Bed#: ED 08 Encounter: [id number]  Primary Care Provider: Lashae Beltre MD   Date and time admitted to hospital: 3/22/2021  6:55 PM    * Pre-syncope  Assessment & Plan  Troponin negative  EKG notable for  Last echo was in 2017 which demonstrated an EF of 60% with mild to moderate aortic regurg  Glucose level 158  TSH 3 850  CT abdomen and pelvis notable for bladder wall thickening and mild surrounding fat stranding  UA unremarkable  Differential diagnosis include vasovagal, orthostatic hypotension, hypotension, dehydration, abnormal heart rhythm, valvular heart disease  Plan  Telemetry  Echocardiogram  Orthostatic vitals  Trend troponin  Hold home blood pressure medication, consider restarting tomorrow if BP improved  Will hold off on further IV fluid hydration  Encourage oral fluid intake  Hypothyroid  Assessment & Plan  TSH 3 85  Continue levothyroxine 75 mg daily, this was recently increased  Vitals reviewed:  Blood pressure improved  None tachycardic  Afebrile  LYLE (acute kidney injury) ([facility] [address] )  Assessment & Plan  Baseline less than 0 9  Met LYLE criteria with a bump in creatine of more than 0 3  Currently at 1 25  Monitor with IV fluid hydration  Rosacea  Assessment & Plan  Continue prn mometasone cream    Hypertension, essential  Assessment & Plan  BP improved  Continue to monitor  Home meds on hold, consider restarting tomorrow    VTE Prophylaxis: Enoxaparin (Lovenox)  / sequential compression device   Code Status: Full code  POLST: POLST form is not discussed and not completed at this time  Anticipated Length of Stay:  Patient will be admitted on an Inpatient basis with an anticipated length of stay of  More than 2 midnights     Justification for Hospital Stay: Evaluation and management of syncope    Chief Complaint:   "I passed out"    History of Present Illness:    Twila Melton Norva Schirmer is a 66 y o  female with a history of hypothyroidism and hypertension who presents with brief loss of consciousness  Patient was well until today evening when she became diaphoretic, lightheaded and flushed while making dinner insert on the stool in the kitchen before passing out  She was with her son who noted that the patient had become unresponsive with episodes of shaking  There is no associated head trauma  Daughter bedside reports that patient was on the ground for only a couple of minutes  The patient denies any tongue biting or loss of bladder or bowel control  She denied any associated palpitation or chest pain but does endorse nausea and vomiting  She denies feeling unwell  Denies any episodes of diarrhea  She denies any fever, chills, sweats or abdominal pain  Has not had a change in appetite  Denies any recent changes in blood pressure medication  Reports compliance to thyroid medication  As per ED, upon arrival by EMS, patient was noted to have a blood pressure in 70s/30s with fingerstick glucose in the 100s  Patient reported to be going in and out of consciousness  On arrival to the emergency department, blood pressure was noted at 90/50  Patient was reported to be sleepy but arousable  Blood pressure improved with 2 L of IV fluids  Upon my evaluation, patient was awake, alert and oriented  She is back to baseline as per the daughter at bedside  Blood work was grossly unremarkable  Troponin was negative, EKG unremarkable  CT abdomen and pelvis notable for multiple or wall thickening but patient denied any urinary symptoms  Review of Systems:    Review of Systems   Constitutional: Positive for diaphoresis  Negative for appetite change, chills, fatigue and fever  Respiratory: Negative for cough and shortness of breath  Cardiovascular: Negative for chest pain, palpitations and leg swelling  Gastrointestinal: Positive for nausea and vomiting   Negative for abdominal distention, abdominal pain and diarrhea  Genitourinary: Negative for difficulty urinating and dysuria  Musculoskeletal: Negative for arthralgias and back pain  Neurological: Positive for syncope and light-headedness  Negative for dizziness, weakness, numbness and headaches  Past Medical and Surgical History:     Past Medical History:   Diagnosis Date    Disease of thyroid gland     Osteopenia     last assessed 12/17/13    Vaginal atrophy        Past Surgical History:   Procedure Laterality Date    DILATION AND CURETTAGE OF UTERUS      TUBAL LIGATION         Meds/Allergies:    Prior to Admission medications    Medication Sig Start Date End Date Taking? Authorizing Provider   candesartan (ATACAND) 16 mg tablet Take 1 tablet (16 mg total) by mouth daily 10/13/20  Yes Giovanni Sigala MD   Cholecalciferol (VITAMIN D3) 2000 units CHEW Chew 2 each daily    Yes Historical Provider, MD   Flaxseed, Linseed, (FLAX SEED OIL) 1300 MG CAPS Take 1 capsule by mouth daily   Yes Historical Provider, MD   mometasone (ELOCON) 0 1 % cream Apply topically daily as needed (allergies) 4/2/19  Yes Giovanni Sigala MD   Omega-3 Fatty Acids (FISH OIL) 645 MG CAPS Take 1 capsule by mouth daily   Yes Historical Provider, MD   Synthroid 75 MCG tablet TAKE 1 TABLET DAILY 3/10/21  Yes Giovanni Sigala MD   estradiol (YUVAFEM) 10 MCG TABS vaginal tablet Insert 1 tablet twice per week  Patient not taking: Reported on 3/22/2021 5/21/19   Yahir Mcqueen DO   rosuvastatin (CRESTOR) 10 MG tablet Take 1 tablet (10 mg total) by mouth daily  Patient not taking: Reported on 3/22/2021 10/13/20   Giovanni Sigala MD     I have reviewed home medications with patient personally  Allergies: Allergies   Allergen Reactions    Other      Seasonal allergies        Social History:     Marital Status:     Patient Pre-hospital Level of Mobility:  Unrestricted  Patient Pre-hospital Diet Restrictions:  Unknown  Substance Use History:   Social History     Substance and Sexual Activity   Alcohol Use Yes    Frequency: Monthly or less    Comment: rare      Social History     Tobacco Use   Smoking Status Former Smoker    Packs/day: 0 25    Years: 8 00    Pack years: 2 00    Types: Cigarettes    Quit date: 5    Years since quittin 2   Smokeless Tobacco Never Used   Tobacco Comment    about 45 years ago      Social History     Substance and Sexual Activity   Drug Use No       Family History:    non-contributory    Physical Exam:     Vitals:   Blood Pressure: 112/59 (21)  Pulse: 80 (21)  Temperature: 98 1 °F (36 7 °C) (21)  Temp Source: Oral (21)  Respirations: 18 (21)  Weight - Scale: 72 kg (158 lb 11 7 oz) (21)  SpO2: 95 % (21)    Physical Exam  Vitals signs and nursing note reviewed  Constitutional:       General: She is not in acute distress  Appearance: Normal appearance  She is well-developed  She is not ill-appearing, toxic-appearing or diaphoretic  HENT:      Head: Normocephalic and atraumatic  Mouth/Throat:      Mouth: Mucous membranes are moist       Pharynx: Oropharynx is clear  Eyes:      Extraocular Movements: Extraocular movements intact  Conjunctiva/sclera: Conjunctivae normal    Cardiovascular:      Rate and Rhythm: Normal rate and regular rhythm  Heart sounds: Normal heart sounds  No murmur  Pulmonary:      Effort: Pulmonary effort is normal       Breath sounds: Normal breath sounds  No wheezing, rhonchi or rales  Abdominal:      General: Bowel sounds are normal  There is no distension  Palpations: Abdomen is soft  Tenderness: There is no abdominal tenderness  Musculoskeletal:      Right lower leg: No edema  Left lower leg: No edema  Skin:     General: Skin is warm and dry  Neurological:      General: No focal deficit present  Mental Status: She is alert and oriented to person, place, and time  Motor: No weakness  Psychiatric:         Mood and Affect: Mood normal          Behavior: Behavior normal          Additional Data:     Lab Results: I have personally reviewed pertinent reports  Results from last 7 days   Lab Units 03/22/21 1917   WBC Thousand/uL 5 10   HEMOGLOBIN g/dL 11 7   HEMATOCRIT % 36 6   PLATELETS Thousands/uL 254   NEUTROS PCT % 61   LYMPHS PCT % 24   MONOS PCT % 9   EOS PCT % 5     Results from last 7 days   Lab Units 03/22/21 1917   POTASSIUM mmol/L 3 7   CHLORIDE mmol/L 104   CO2 mmol/L 25   BUN mg/dL 17   CREATININE mg/dL 1 25   CALCIUM mg/dL 9 0   ALK PHOS U/L 71   ALT U/L 29   AST U/L 19           Imaging: I have personally reviewed pertinent reports  Ct Abdomen Pelvis With Contrast    Result Date: 3/22/2021  Narrative: CT ABDOMEN AND PELVIS WITH IV CONTRAST INDICATION:   Nausea/vomiting intractable n/v, OK to do without creatinine/gfr  COMPARISON:  None  TECHNIQUE:  CT examination of the abdomen and pelvis was performed  Axial, sagittal, and coronal 2D reformatted images were created from the source data and submitted for interpretation  Radiation dose length product (DLP) for this visit:  421 mGy-cm   This examination, like all CT scans performed in the [facility], was performed utilizing techniques to minimize radiation dose exposure, including the use of iterative reconstruction and automated exposure control  IV Contrast:  100 mL of iohexol (OMNIPAQUE) Enteric Contrast:  Enteric contrast was not administered  FINDINGS: ABDOMEN LOWER CHEST:  Large hiatal hernia  The lung bases  LIVER/BILIARY TREE:  One or more subcentimeter sharply circumscribed low-density hepatic lesion(s) are noted, too small to accurately characterize, but statistically most likely to represent subcentimeter hepatic cysts  No suspicious solid hepatic lesion is identified  Hepatic contours are normal   No biliary dilatation  GALLBLADDER:  No calcified gallstones   No pericholecystic inflammatory change  SPLEEN:  Unremarkable  PANCREAS:  Unremarkable  ADRENAL GLANDS:  Unremarkable  KIDNEYS/URETERS:  Unremarkable  No hydronephrosis  STOMACH AND BOWEL:  Unremarkable  APPENDIX:  Noninflamed  ABDOMINOPELVIC CAVITY:  No ascites  No pneumoperitoneum  No lymphadenopathy  VESSELS:  Unremarkable for patient's age  PELVIS REPRODUCTIVE ORGANS:  Unremarkable for patient's age  URINARY BLADDER:  Potential circumferential bladder wall thickening and mild surrounding fat stranding; limited assessment as the bladder is only partially distended  Please assess for cystitis  ABDOMINAL WALL/INGUINAL REGIONS:  Unremarkable  OSSEOUS STRUCTURES:  No acute fracture or destructive osseous lesion  Impression: Potential circumferential bladder wall thickening and mild surrounding fat stranding; limited assessment as the bladder is only partially distended  Please assess for cystitis  The study was marked in [facility] for immediate notification  Workstation performed: [id number]       EKG, Pathology, and Other Studies Reviewed on Admission:   · EKG: Normal sinus rythmn    Epic / Care Everywhere Records Reviewed: Yes     ** Please Note: This note has been constructed using a voice recognition system   **

## 2021-03-23 NOTE — DISCHARGE INSTR - AVS FIRST PAGE
Dear Jared Hernandez,     It was our pleasure to care for you here at [facility]  It is our hope that we were always able to exceed the expected standards for your care during your stay  You were hospitalized due to passing out  You were cared for on the 2nd floor by Sekou Duarte MD under the service of Caryle Deputy, MD with the [facility] who covers for your primary care physician (PCP), Breanne Saini MD, while you were hospitalized  If you have any questions or concerns related to this hospitalization, you may contact us at [phone]  For follow up as well as any medication refills, we recommend that you follow up with your primary care physician  A registered nurse will reach out to you by phone within a few days after your discharge to answer any additional questions that you may have after going home  However, at this time we provide for you here, the most important instructions / recommendations at discharge:     · Notable Medication Adjustments -   · None  Start Candesartan 16 mg tomorrow  Please follow up with primary care provider to see if need to adjust blood pressure medicine  · Testing Required after Discharge -   · None  · Important follow up information -   · Please follow-up with primary care provider in 1 week  · Other Instructions -   · Please ensure hydration throughout the day  · Please review this entire after visit summary as additional general instructions including medication list, appointments, activity, diet, any pertinent wound care, and other additional recommendations from your care team that may be provided for you        Sincerely,     Sekou Duarte MD

## 2021-03-23 NOTE — ASSESSMENT & PLAN NOTE
Baseline less than 0 9  Met LYLE criteria with a bump in creatine of more than 0 3  Currently at 1 25  Monitor with IV fluid hydration

## 2021-03-23 NOTE — ASSESSMENT & PLAN NOTE
TSH 3 85  Continue levothyroxine 75 mg daily, this was recently increased  Vitals reviewed:  Blood pressure improved  None tachycardic  Afebrile    Follow-up with PCP

## 2021-03-23 NOTE — PLAN OF CARE
Problem: PAIN - ADULT  Goal: Verbalizes/displays adequate comfort level or baseline comfort level  Description: Interventions:  - Encourage patient to monitor pain and request assistance  - Assess pain using appropriate pain scale  - Administer analgesics based on type and severity of pain and evaluate response  - Implement non-pharmacological measures as appropriate and evaluate response  - Consider cultural and social influences on pain and pain management  - Notify physician/advanced practitioner if interventions unsuccessful or patient reports new pain  Outcome: Progressing     Problem: INFECTION - ADULT  Goal: Absence or prevention of progression during hospitalization  Description: INTERVENTIONS:  - Assess and monitor for signs and symptoms of infection  - Monitor lab/diagnostic results  - Monitor all insertion sites, i e  indwelling lines, tubes, and drains  - Monitor endotracheal if appropriate and nasal secretions for changes in amount and color  - [place] appropriate cooling/warming therapies per order  - Administer medications as ordered  - Instruct and encourage patient and family to use good hand hygiene technique  - Identify and instruct in appropriate isolation precautions for identified infection/condition  Outcome: Progressing  Goal: Absence of fever/infection during neutropenic period  Description: INTERVENTIONS:  - Monitor WBC    Outcome: Progressing     Problem: SAFETY ADULT  Goal: Patient will remain free of falls  Description: INTERVENTIONS:  - Assess patient frequently for physical needs  -  Identify cognitive and physical deficits and behaviors that affect risk of falls    -  [place] fall precautions as indicated by assessment   - Educate patient/family on patient safety including physical limitations  - Instruct patient to call for assistance with activity based on assessment  - Modify environment to reduce risk of injury  - Consider OT/PT consult to assist with strengthening/mobility  Outcome: Progressing  Goal: Maintain or return to baseline ADL function  Description: INTERVENTIONS:  -  Assess patient's ability to carry out ADLs; assess patient's baseline for ADL function and identify physical deficits which impact ability to perform ADLs (bathing, care of mouth/teeth, toileting, grooming, dressing, etc )  - Assess/evaluate cause of self-care deficits   - Assess range of motion  - Assess patient's mobility; develop plan if impaired  - Assess patient's need for assistive devices and provide as appropriate  - Encourage maximum independence but intervene and supervise when necessary  - Involve family in performance of ADLs  - Assess for home care needs following discharge   - Consider OT consult to assist with ADL evaluation and planning for discharge  - Provide patient education as appropriate  Outcome: Progressing  Goal: Maintain or return mobility status to optimal level  Description: INTERVENTIONS:  - Assess patient's baseline mobility status (ambulation, transfers, stairs, etc )    - Identify cognitive and physical deficits and behaviors that affect mobility  - Identify mobility aids required to assist with transfers and/or ambulation (gait belt, sit-to-stand, lift, walker, cane, etc )  - [place] fall precautions as indicated by assessment  - Record patient progress and toleration of activity level on Mobility SBAR; progress patient to next Phase/Stage  - Instruct patient to call for assistance with activity based on assessment  - Consider rehabilitation consult to assist with strengthening/weightbearing, etc   Outcome: Progressing     Problem: DISCHARGE PLANNING  Goal: Discharge to home or other facility with appropriate resources  Description: INTERVENTIONS:  - Identify barriers to discharge w/patient and caregiver  - Arrange for needed discharge resources and transportation as appropriate  - Identify discharge learning needs (meds, wound care, etc )  - Arrange for interpretive services to assist at discharge as needed  - Refer to Case Management Department for coordinating discharge planning if the patient needs post-hospital services based on physician/advanced practitioner order or complex needs related to functional status, cognitive ability, or social support system  Outcome: Progressing     Problem: Knowledge Deficit  Goal: Patient/family/caregiver demonstrates understanding of disease process, treatment plan, medications, and discharge instructions  Description: Complete learning assessment and assess knowledge base    Interventions:  - Provide teaching at level of understanding  - Provide teaching via preferred learning methods  Outcome: Progressing

## 2021-03-23 NOTE — DISCHARGE SUMMARY
[address]  Discharge- Valeta Marcio 1945, 66 y o  female MRN: [id number]  Unit/Bed#: S -01 Encounter: [id number]  Primary Care Provider: Mellisa Rodriguez MD   Date and time admitted to hospital: 3/22/2021  6:55 PM    * Syncope  Assessment & Plan  Troponin negative  EKG notable for  Last echo was in 2017 which demonstrated an EF of 60% with mild to moderate aortic regurg  Glucose level 158  TSH 3 850  CT abdomen and pelvis notable for bladder wall thickening and mild surrounding fat stranding  UA unremarkable  Differential diagnosis include vasovagal, orthostatic hypotension, hypotension, dehydration, abnormal heart rhythm, valvular heart disease  Telemetry did not events  Echocardiogram result pending  Orthostatic BP is negative for orthostatic hypotension  Trend troponin is negative  Resume blood pressure medicine after discharge  Encourage oral fluid intake  LYLE (acute kidney injury) ([facility] [address] )  Assessment & Plan  Baseline less than 0 9  Recent Labs     03/22/21  1917 03/23/21  0452   CREATININE 1 25 0 87   EGFR 42 64     Currently resolved    Hypothyroid  Assessment & Plan  TSH 3 85  Continue levothyroxine 75 mg daily, this was recently increased  Vitals reviewed:  Blood pressure improved  None tachycardic  Afebrile    Follow-up with PCP    Donna  Assessment & Plan  Continue prn mometasone cream    Hypertension, essential  Assessment & Plan  BP improved  Continue to monitor  Resume home medication    Nutrition Assessment and Intervention:     Reviewed food recall journal    Recommended completion of food recall journal      Other interventions: Encouraged water intake at least 2 L a day    Physical Activity Assessment and Intervention:    Activity journal reviewed    Activity journal completion recommended      Emotional and Mental Well-being, Sleep, Connectedness Assessment and Intervention:    Sleep/stress assessment performed    Depression and anxiety screening performed and reviewed      Tobacco and Toxic Substance Assessment and Intervention:     Tobacco use screening performed    Alcohol and drug use screening performed      Discharging Resident Physician: Toby Harris MD  Attending: Po Dorsey MD  PCP: Niko Michel MD  Admission Date: 3/22/2021  Discharge Date: 03/23/21    Disposition:     Home    Reason for Admission: syncope    Consultations During Hospital Stay:  · [facility] attending physician    Procedures Performed:     · None    Significant Findings / Test Results:   Xr Chest 1 View Portable    Result Date: 3/23/2021  Impression: No acute cardiopulmonary disease  Workstation performed: [id number]     Ct Abdomen Pelvis With Contrast    Result Date: 3/22/2021  Impression: Potential circumferential bladder wall thickening and mild surrounding fat stranding; limited assessment as the bladder is only partially distended  Please assess for cystitis  The study was marked in [facility] for immediate notification  Workstation performed: [id number]     Incidental Findings:   · None    Test Results Pending at Discharge (will require follow up): · Cardiac echogram     Outpatient Tests Requested:  · None    Complications:  none    Hospital Course:     Annmarie Hoover is a 66 y o  female patient who originally presented to the hospital on 3/22/2021 due to brief loss of consciousness  Patient was well until today evening when she became diaphoretic, lightheaded and flushed while making dinner insert on the stool in the kitchen before passing out  She was with her son who noted that the patient had become unresponsive with episodes of shaking  There is no associated head trauma  She did report she had two cups of coffee and only three glasses of water  Upon arrival by EMS, patient was noted to have a blood pressure in 70s/30s with fingerstick glucose in the 100s  Patient reported to be going in and out of consciousness    On arrival to the emergency department, blood pressure was noted at 90/50  Patient was reported to be sleepy but arousable  Blood pressure improved with 2 L of IV fluids  Blood work was grossly unremarkable  Troponin was negative, EKG unremarkable  CT abdomen and pelvis notable for multiple or wall thickening but patient denied any urinary symptoms  LYLE also improve after IVF  She denied symptoms prior to discharge  She is medically stable to be discharged today  Condition at Discharge: stable     Discharge Day Visit / Exam:     Subjective:    Vitals: Blood Pressure: (!) 129/104 (03/23/21 0851)  Pulse: 78 (03/23/21 0851)  Temperature: 98 1 °F (36 7 °C) (03/22/21 1909)  Temp Source: Oral (03/22/21 1909)  Respirations: 20 (03/23/21 0851)  Weight - Scale: 72 kg (158 lb 11 7 oz) (03/22/21 1909)  SpO2: 97 % (03/23/21 0700)  Exam:   Physical Exam  Vitals signs and nursing note reviewed  Constitutional:       General: She is not in acute distress  Appearance: She is well-developed  She is not diaphoretic  HENT:      Head: Normocephalic and atraumatic  Eyes:      General: No scleral icterus  Right eye: No discharge  Left eye: No discharge  Conjunctiva/sclera: Conjunctivae normal    Neck:      Musculoskeletal: Normal range of motion and neck supple  Cardiovascular:      Rate and Rhythm: Normal rate and regular rhythm  Heart sounds: Normal heart sounds  No murmur  No friction rub  No gallop  Pulmonary:      Effort: Pulmonary effort is normal  No respiratory distress  Breath sounds: Normal breath sounds  No stridor  No wheezing or rales  Chest:      Chest wall: No tenderness  Abdominal:      General: Bowel sounds are normal  There is no distension  Palpations: Abdomen is soft  There is no mass  Tenderness: There is no abdominal tenderness  There is no guarding or rebound  Hernia: No hernia is present  Musculoskeletal: Normal range of motion  General: No tenderness or deformity     Skin:     General: Skin is warm  Coloration: Skin is not pale  Findings: Erythema (face) present  Neurological:      General: No focal deficit present  Mental Status: She is alert and oriented to person, place, and time  Mental status is at baseline  Psychiatric:         Behavior: Behavior normal            Discussion with Family: patient herself    Discharge instructions/Information to patient and family:   See after visit summary for information provided to patient and family  Provisions for Follow-Up Care:  See after visit summary for information related to follow-up care and any pertinent home health orders  Planned Readmission: none     Discharge Medications:  See after visit summary for reconciled discharge medications provided to patient and family        ** Please Note: This note has been constructed using a voice recognition system **

## 2021-03-23 NOTE — ASSESSMENT & PLAN NOTE
Troponin negative  EKG notable for  Last echo was in 2017 which demonstrated an EF of 60% with mild to moderate aortic regurg  Glucose level 158  TSH 3 850  CT abdomen and pelvis notable for bladder wall thickening and mild surrounding fat stranding  UA unremarkable  Differential diagnosis include vasovagal, orthostatic hypotension, hypotension, dehydration, abnormal heart rhythm, valvular heart disease  Plan  Telemetry  Echocardiogram  Orthostatic vitals  Trend troponin  Hold home blood pressure medication, consider restarting tomorrow if BP improved  Will hold off on further IV fluid hydration  Encourage oral fluid intake

## 2021-03-23 NOTE — ASSESSMENT & PLAN NOTE
Troponin negative  EKG notable for  Last echo was in 2017 which demonstrated an EF of 60% with mild to moderate aortic regurg  Glucose level 158  TSH 3 850  CT abdomen and pelvis notable for bladder wall thickening and mild surrounding fat stranding  UA unremarkable  Differential diagnosis include vasovagal, orthostatic hypotension, hypotension, dehydration, abnormal heart rhythm, valvular heart disease  Telemetry did not events  Echocardiogram result pending  Orthostatic BP is negative for orthostatic hypotension  Trend troponin is negative  Resume blood pressure medicine after discharge  Encourage oral fluid intake

## 2021-03-23 NOTE — ASSESSMENT & PLAN NOTE
Baseline less than 0 9    Recent Labs     03/22/21  1917 03/23/21  0452   CREATININE 1 25 0 87   EGFR 42 64     Currently resolved

## 2021-03-24 ENCOUNTER — TRANSITIONAL CARE MANAGEMENT (OUTPATIENT)
Dept: INTERNAL MEDICINE CLINIC | Age: 76
End: 2021-03-24

## 2021-03-24 LAB
ATRIAL RATE: 62 BPM
P AXIS: 32 DEGREES
PR INTERVAL: 130 MS
QRS AXIS: 40 DEGREES
QRSD INTERVAL: 78 MS
QT INTERVAL: 440 MS
QTC INTERVAL: 440 MS
T WAVE AXIS: 45 DEGREES
VENTRICULAR RATE: 60 BPM

## 2021-03-24 PROCEDURE — 93010 ELECTROCARDIOGRAM REPORT: CPT | Performed by: INTERNAL MEDICINE

## 2021-03-25 ENCOUNTER — OFFICE VISIT (OUTPATIENT)
Dept: INTERNAL MEDICINE CLINIC | Facility: CLINIC | Age: 76
End: 2021-03-25
Payer: MEDICARE

## 2021-03-25 ENCOUNTER — TELEPHONE (OUTPATIENT)
Dept: INTERNAL MEDICINE CLINIC | Facility: CLINIC | Age: 76
End: 2021-03-25

## 2021-03-25 VITALS
HEIGHT: 64 IN | SYSTOLIC BLOOD PRESSURE: 130 MMHG | BODY MASS INDEX: 27.1 KG/M2 | WEIGHT: 158.73 LBS | DIASTOLIC BLOOD PRESSURE: 70 MMHG

## 2021-03-25 DIAGNOSIS — E03.9 HYPOTHYROIDISM, UNSPECIFIED TYPE: ICD-10-CM

## 2021-03-25 DIAGNOSIS — E53.8 VITAMIN B12 DEFICIENCY: ICD-10-CM

## 2021-03-25 DIAGNOSIS — R55 SYNCOPE AND COLLAPSE: Primary | ICD-10-CM

## 2021-03-25 DIAGNOSIS — E86.0 DEHYDRATION AFTER EXERTION: ICD-10-CM

## 2021-03-25 DIAGNOSIS — I10 HYPERTENSION, ESSENTIAL: ICD-10-CM

## 2021-03-25 DIAGNOSIS — E78.00 HYPERCHOLESTEROLEMIA: ICD-10-CM

## 2021-03-25 DIAGNOSIS — R79.9 ABNORMAL FINDING OF BLOOD CHEMISTRY, UNSPECIFIED: ICD-10-CM

## 2021-03-25 PROCEDURE — 99496 TRANSJ CARE MGMT HIGH F2F 7D: CPT | Performed by: INTERNAL MEDICINE

## 2021-03-25 NOTE — TELEPHONE ENCOUNTER
Called patient  Recently admitted to the hospital on 03/23/2021 after syncopal episode which was associated with hypotension  She did report prolonged standing as she had been cooking in the kitchen all day and noted that it was a little harder in her house than usual she admits that she did not drink enough fluids that day  When she was seen in the ED she was found to be hypotensive and orthostatic she was given IV fluids with the immediate recovery  She was watched overnight in the hospital troponin was negative  An echocardiogram was performed which did not show much changed from the mild underlying 8 aortic regurgitation that was seen on previous echo  She was discharged to home and at the present time feels fine  She is presently on a prescribed dose of antihypertensive medication, candesartan 16 mg daily and Synthroid 75 mcg daily  She has not yet started her anti cholesterol medication as she has modified her diet and would like to get her cholesterol checked before her next in office visit

## 2021-03-25 NOTE — TELEPHONE ENCOUNTER
Called and spoke to patient  Patient only wanted to see Dr Melissa Nuno  It is unclear if Dr Melissa Nuno will be in office  Informed pt I will schedule her with Mariah Wallace, she was agreeable, and touch base with Dr Melissa Nuno over weekend to see if she will be in the office  Phone only TCM scheduled with Mariah Wallace on 3/31 at 26 AM   Pt did not wish to come into office  I informed her if Dr Melissa Nuno is going to be in office I will try and switch her to Dr Carter's schedule and I will touch base with patient early next week  Bimal Edmondson:  Please update your note  Pt denies any sxs, denies any post hospital issues, was able to get per prescribed medications, is able to manage her own medications  Thanks so much

## 2021-03-26 ENCOUNTER — TELEPHONE (OUTPATIENT)
Dept: INTERNAL MEDICINE CLINIC | Facility: CLINIC | Age: 76
End: 2021-03-26

## 2021-03-26 DIAGNOSIS — I10 HYPERTENSION, ESSENTIAL: ICD-10-CM

## 2021-03-26 RX ORDER — CANDESARTAN 16 MG/1
16 TABLET ORAL DAILY
Qty: 90 TABLET | Refills: 1 | Status: SHIPPED | OUTPATIENT
Start: 2021-03-26 | End: 2021-06-30 | Stop reason: SDUPTHER

## 2021-03-26 NOTE — TELEPHONE ENCOUNTER
Spoke to patient and scheduled 3M follow up 6/30/2021  Mailed blood work to patient  Informed her that I cancelled pending TCM with Radha Foster since she had her TCM with DR Eduin Ambrosio last night 3/25/2021

## 2021-03-26 NOTE — TELEPHONE ENCOUNTER
Dr Suly Bullard saw patient for TCM tonight 3/25/2021  Rescheduled pending appt with Vikki Ozuna to Dr Carrie Rivera

## 2021-03-26 NOTE — PROGRESS NOTES
Assessment/Plan:        Problem List Items Addressed This Visit        Cardiovascular and Mediastinum    Syncope and collapse - Primary       Other    Dehydration after exertion     Recommend adequate hydration  Monitor blood pressure  Reason for visit is   Chief Complaint   Patient presents with    Transition of Care Management       Encounter provider Anuradha Patino MD       Provider located at [address] [address]      Recent Visits  No visits were found meeting these conditions  Showing recent visits within past 7 days and meeting all other requirements     Today's Visits  Date Type Provider Dept   03/25/21 Office Visit Anuradha Patino MD [facility]   03/25/21 Telephone Lory Sargent [facility]   Showing today's visits and meeting all other requirements     Future Appointments  No visits were found meeting these conditions  Showing future appointments within next 150 days and meeting all other requirements        After connecting through [employer], the patient was identified by name and date of birth  Indy Montes was informed that this is a telemedicine visit and that the visit is being conducted through telephone  My office door was closed  No one else was in the room  She acknowledged consent and understanding of privacy and security of the video platform  The patient has agreed to participate and understands they can discontinue the visit at any time  Patient is aware this is a billable service  Subjective:     Patient ID: Indy Montes is a 66 y o  female  HPI    Review of Systems   Hematological:        Flushes easily   All other systems reviewed and are negative          Objective:    Vitals:    03/25/21 2034   BP: 130/70   Weight: 72 kg (158 lb 11 7 oz)   Height: 5' 4" (1 626 m) Physical Exam          Transitional Care Management Review:  Yoana Macdonald is a 66 y o  female here for TCM follow up  During the TCM phone call patient stated:    TCM Call (since 2/22/2021)     Date and time call was made  3/24/2021 11:16 AM    Hospital care reviewed  Records reviewed    Patient was hospitialized at  [address] [place] [address]        Date of Admission  03/22/21    Date of discharge  03/23/21    Diagnosis  Syncope, Non-intractable vomiting with nausea    Disposition  Home    Were the patients medications reviewed and updated  Yes      TCM Call (since 2/22/2021)     None        Recently admitted to the hospital on 03/23/2021 after syncopal episode which was associated with hypotension  She did report prolonged standing as she had been cooking in the kitchen all day and noted that it was a little harder in her house than usual she admits that she did not drink enough fluids that day  When she was seen in the ED she was found to be hypotensive and orthostatic she was given IV fluids with the immediate recovery  She was watched overnight in the hospital troponin was negative  An echocardiogram was performed which did not show much changed from the mild underlying 8 aortic regurgitation that was seen on previous echo  She was discharged to home and at the present time feels fine  She is presently on a prescribed dose of antihypertensive medication, candesartan 16 mg daily and Synthroid 75 mcg daily  She has not yet started her anti cholesterol medication as she has modified her diet and would like to get her cholesterol checked before her next in office visit  Today she feels well and denies any symptoms    I spent 25 minutes with the patient during this visit      Tristan Blanco MD

## 2021-03-26 NOTE — TELEPHONE ENCOUNTER
----- Message from Anuradha Patino MD sent at 3/25/2021  8:59 PM EDT -----  Regarding: follow up visit  Need blood work  Please mail script to patient home    F/U in 3 months   Please let pt know that she does NOT need to come in for another visit next week, I already did the virtual RUBY    ty

## 2021-03-26 NOTE — TELEPHONE ENCOUNTER
----- Message from Will Cote MD sent at 3/25/2021  8:59 PM EDT -----  Regarding: follow up visit  Need blood work  Please mail script to patient home    F/U in 3 months   Please let pt know that she does NOT need to come in for another visit next week, I already did the virtual RUBY    ty

## 2021-03-27 LAB
BACTERIA BLD CULT: NORMAL
BACTERIA BLD CULT: NORMAL

## 2021-03-29 NOTE — PHYSICIAN ADVISOR
Current patient class: Inpatient  The patient is currently on Hospital Day: 2 at [address]      The patient was admitted to the hospital  on 3/22/21 at 2033 for the following diagnosis:  Syncope [R55]  Non-intractable vomiting with nausea, unspecified vomiting type [R11 2]     After review of the relevant documentation, labs, vital signs and test results, the patient is a provider liable case and is most appropriate for OBSERVATION STATUS  In this particular case the patient was admitted to the hospital as an inpatient  The patient however fails to satisfy the 2 midnight benchmark and closer scrutiny of the case is warranted  After review of the patient presentation and relevant labs the patient was most appropriate for observation status on admission  Given that this patient has already been discharged prior to this review they become a provider liable case  Rationale is as follows: The patient is a 66 yrs   Female who presented to the ED at 3/22/2021  6:55 PM with a chief complaint of Syncope (Patient arrives to ED via EMS with report of feeling dizzy after making supper and sitting down on couch  Per EMS was in and out of conciousness with BP of 70/30  Currently Awake and oriented x4)   She was admitted for syncope  Serial cardiac enzymes were negative echocardiogram was performed which showed an ejection fraction of 60% with mild-to-moderate aortic regurgitation  CT scan of the abdomen and pelvis was without acute findings  Orthostatic vital signs were negative  Telemetry was without events  She received IV hydration with improvement in symptoms and was discharged on hospital day 2  She is observation appropriate      The patients vitals on arrival were   ED Triage Vitals   Temperature Pulse Respirations Blood Pressure SpO2   03/22/21 1909 03/22/21 1909 03/22/21 1909 03/22/21 1909 03/22/21 1909   98 1 °F (36 7 °C) 97 22 98/56 96 %      Temp Source Heart Rate Source Patient Position - Orthostatic VS BP Location FiO2 (%)   03/22/21 1909 03/22/21 1909 03/22/21 1909 03/22/21 1909 --   Oral Monitor Sitting Right arm       Pain Score       03/23/21 1300       No Pain           Past Medical History:   Diagnosis Date    Disease of thyroid gland     Osteopenia     last assessed 12/17/13    Vaginal atrophy      Past Surgical History:   Procedure Laterality Date    DILATION AND CURETTAGE OF UTERUS      TUBAL LIGATION             Consults have been placed to:   None    Vitals:    03/23/21 0725 03/23/21 0726 03/23/21 0731 03/23/21 0851   BP: 124/60 134/58 152/65 (!) 129/104   BP Location: Left arm Left arm Left arm Right arm   Pulse: 69 68 73 78   Resp: 18 16 18 20   Temp:       TempSrc:       SpO2:       Weight:           Most recent labs:    No results for input(s): WBC, HGB, HCT, PLT, K, NA, CALCIUM, BUN, CREATININE, LIPASE, AMYLASE, INR, TROPONINI, CKTOTAL, AST, ALT, ALKPHOS, BILITOT in the last 72 hours  Scheduled Meds:  Continuous Infusions:No current facility-administered medications for this encounter  PRN Meds:      Surgical procedures (if appropriate):

## 2021-06-25 ENCOUNTER — APPOINTMENT (OUTPATIENT)
Dept: LAB | Facility: MEDICAL CENTER | Age: 76
End: 2021-06-25
Payer: MEDICARE

## 2021-06-25 DIAGNOSIS — I10 HYPERTENSION, ESSENTIAL: ICD-10-CM

## 2021-06-25 DIAGNOSIS — E53.8 VITAMIN B12 DEFICIENCY: ICD-10-CM

## 2021-06-25 DIAGNOSIS — R79.9 ABNORMAL FINDING OF BLOOD CHEMISTRY, UNSPECIFIED: ICD-10-CM

## 2021-06-25 DIAGNOSIS — M85.80 OSTEOPENIA, UNSPECIFIED LOCATION: ICD-10-CM

## 2021-06-25 DIAGNOSIS — E03.9 HYPOTHYROIDISM, UNSPECIFIED TYPE: ICD-10-CM

## 2021-06-25 DIAGNOSIS — E78.00 HYPERCHOLESTEROLEMIA: ICD-10-CM

## 2021-06-25 DIAGNOSIS — R55 SYNCOPE AND COLLAPSE: ICD-10-CM

## 2021-06-25 LAB
25(OH)D3 SERPL-MCNC: 55.9 NG/ML (ref 30–100)
ALBUMIN SERPL BCP-MCNC: 3.6 G/DL (ref 3.5–5)
ALP SERPL-CCNC: 66 U/L (ref 46–116)
ALT SERPL W P-5'-P-CCNC: 22 U/L (ref 12–78)
ANION GAP SERPL CALCULATED.3IONS-SCNC: 7 MMOL/L (ref 4–13)
AST SERPL W P-5'-P-CCNC: 18 U/L (ref 5–45)
BASOPHILS # BLD AUTO: 0.03 THOUSANDS/ΜL (ref 0–0.1)
BASOPHILS NFR BLD AUTO: 1 % (ref 0–1)
BILIRUB SERPL-MCNC: 0.7 MG/DL (ref 0.2–1)
BUN SERPL-MCNC: 11 MG/DL (ref 5–25)
CALCIUM SERPL-MCNC: 9.6 MG/DL (ref 8.3–10.1)
CHLORIDE SERPL-SCNC: 103 MMOL/L (ref 100–108)
CHOLEST SERPL-MCNC: 304 MG/DL (ref 50–200)
CO2 SERPL-SCNC: 25 MMOL/L (ref 21–32)
CREAT SERPL-MCNC: 0.76 MG/DL (ref 0.6–1.3)
EOSINOPHIL # BLD AUTO: 0.31 THOUSAND/ΜL (ref 0–0.61)
EOSINOPHIL NFR BLD AUTO: 9 % (ref 0–6)
ERYTHROCYTE [DISTWIDTH] IN BLOOD BY AUTOMATED COUNT: 13.6 % (ref 11.6–15.1)
EST. AVERAGE GLUCOSE BLD GHB EST-MCNC: 108 MG/DL
GFR SERPL CREATININE-BSD FRML MDRD: 75 ML/MIN/1.73SQ M
GLUCOSE P FAST SERPL-MCNC: 84 MG/DL (ref 65–99)
HBA1C MFR BLD: 5.4 %
HCT VFR BLD AUTO: 39.1 % (ref 34.8–46.1)
HDLC SERPL-MCNC: 121 MG/DL
HGB BLD-MCNC: 12.6 G/DL (ref 11.5–15.4)
IMM GRANULOCYTES # BLD AUTO: 0 THOUSAND/UL (ref 0–0.2)
IMM GRANULOCYTES NFR BLD AUTO: 0 % (ref 0–2)
LDLC SERPL CALC-MCNC: 170 MG/DL (ref 0–100)
LYMPHOCYTES # BLD AUTO: 1.24 THOUSANDS/ΜL (ref 0.6–4.47)
LYMPHOCYTES NFR BLD AUTO: 37 % (ref 14–44)
MCH RBC QN AUTO: 32.4 PG (ref 26.8–34.3)
MCHC RBC AUTO-ENTMCNC: 32.2 G/DL (ref 31.4–37.4)
MCV RBC AUTO: 101 FL (ref 82–98)
MONOCYTES # BLD AUTO: 0.49 THOUSAND/ΜL (ref 0.17–1.22)
MONOCYTES NFR BLD AUTO: 15 % (ref 4–12)
NEUTROPHILS # BLD AUTO: 1.27 THOUSANDS/ΜL (ref 1.85–7.62)
NEUTS SEG NFR BLD AUTO: 38 % (ref 43–75)
NRBC BLD AUTO-RTO: 0 /100 WBCS
PLATELET # BLD AUTO: 281 THOUSANDS/UL (ref 149–390)
PMV BLD AUTO: 9.9 FL (ref 8.9–12.7)
POTASSIUM SERPL-SCNC: 4.3 MMOL/L (ref 3.5–5.3)
PROT SERPL-MCNC: 7.4 G/DL (ref 6.4–8.2)
RBC # BLD AUTO: 3.89 MILLION/UL (ref 3.81–5.12)
SODIUM SERPL-SCNC: 135 MMOL/L (ref 136–145)
TRIGL SERPL-MCNC: 65 MG/DL
TSH SERPL DL<=0.05 MIU/L-ACNC: 0.83 UIU/ML (ref 0.36–3.74)
WBC # BLD AUTO: 3.34 THOUSAND/UL (ref 4.31–10.16)

## 2021-06-25 PROCEDURE — 36415 COLL VENOUS BLD VENIPUNCTURE: CPT

## 2021-06-25 PROCEDURE — 84443 ASSAY THYROID STIM HORMONE: CPT

## 2021-06-25 PROCEDURE — 83036 HEMOGLOBIN GLYCOSYLATED A1C: CPT

## 2021-06-25 PROCEDURE — 80053 COMPREHEN METABOLIC PANEL: CPT

## 2021-06-25 PROCEDURE — 82306 VITAMIN D 25 HYDROXY: CPT

## 2021-06-25 PROCEDURE — 80061 LIPID PANEL: CPT

## 2021-06-25 PROCEDURE — 85025 COMPLETE CBC W/AUTO DIFF WBC: CPT

## 2021-06-30 ENCOUNTER — OFFICE VISIT (OUTPATIENT)
Dept: INTERNAL MEDICINE CLINIC | Facility: CLINIC | Age: 76
End: 2021-06-30
Payer: MEDICARE

## 2021-06-30 VITALS
SYSTOLIC BLOOD PRESSURE: 132 MMHG | HEART RATE: 69 BPM | TEMPERATURE: 97.6 F | BODY MASS INDEX: 24.7 KG/M2 | WEIGHT: 139.4 LBS | OXYGEN SATURATION: 98 % | HEIGHT: 63 IN | DIASTOLIC BLOOD PRESSURE: 76 MMHG

## 2021-06-30 DIAGNOSIS — E03.9 HYPOTHYROIDISM, UNSPECIFIED TYPE: Primary | ICD-10-CM

## 2021-06-30 DIAGNOSIS — E53.8 VITAMIN B12 DEFICIENCY: ICD-10-CM

## 2021-06-30 DIAGNOSIS — I10 HYPERTENSION, ESSENTIAL: ICD-10-CM

## 2021-06-30 DIAGNOSIS — E78.00 HYPERCHOLESTEROLEMIA: ICD-10-CM

## 2021-06-30 DIAGNOSIS — E55.9 VITAMIN D DEFICIENCY: ICD-10-CM

## 2021-06-30 DIAGNOSIS — Z00.00 MEDICARE ANNUAL WELLNESS VISIT, SUBSEQUENT: ICD-10-CM

## 2021-06-30 DIAGNOSIS — M85.872 OTHER SPECIFIED DISORDERS OF BONE DENSITY AND STRUCTURE, LEFT ANKLE AND FOOT: ICD-10-CM

## 2021-06-30 DIAGNOSIS — Z86.010 HISTORY OF COLON POLYPS: ICD-10-CM

## 2021-06-30 DIAGNOSIS — Z11.59 NEED FOR HEPATITIS C SCREENING TEST: ICD-10-CM

## 2021-06-30 DIAGNOSIS — E01.8 IODINE HYPOTHYROIDISM: ICD-10-CM

## 2021-06-30 PROCEDURE — 1123F ACP DISCUSS/DSCN MKR DOCD: CPT | Performed by: INTERNAL MEDICINE

## 2021-06-30 PROCEDURE — G0439 PPPS, SUBSEQ VISIT: HCPCS | Performed by: INTERNAL MEDICINE

## 2021-06-30 PROCEDURE — 99214 OFFICE O/P EST MOD 30 MIN: CPT | Performed by: INTERNAL MEDICINE

## 2021-06-30 RX ORDER — ROSUVASTATIN CALCIUM 10 MG/1
10 TABLET, COATED ORAL DAILY
Qty: 30 TABLET | Refills: 5
Start: 2021-06-30 | End: 2022-01-12 | Stop reason: SDUPTHER

## 2021-06-30 RX ORDER — CANDESARTAN 16 MG/1
16 TABLET ORAL DAILY
Qty: 90 TABLET | Refills: 1 | Status: SHIPPED | OUTPATIENT
Start: 2021-06-30 | End: 2021-08-16

## 2021-06-30 RX ORDER — LEVOTHYROXINE SODIUM 0.07 MG/1
75 TABLET ORAL DAILY
Qty: 90 TABLET | Refills: 1 | Status: SHIPPED | OUTPATIENT
Start: 2021-06-30 | End: 2022-01-14 | Stop reason: SDUPTHER

## 2021-06-30 NOTE — PROGRESS NOTES
Assessment/Plan:    Rosuvastatin 10 mg a day with dinner  Watch for leg cramps or joint pain or back pain  Stop the medication if you have side effects  Continue present lifestyle  Blood work is good all your vital signs look good  Recheck blood work prior to next visit to check cholesterol and hepatic function  Schedule bone density testing  Continue vitamin-D supplementation levels are good  Patient is also due for colonoscopy and referral was provided    Hypertension, essential  Will need to increase med dosage in addition to lifestyle and weight loss    Hypercholesterolemia  Recommend compliance with medication                  BMI Counseling: There is no height or weight on file to calculate BMI  The BMI is above normal  Nutrition recommendations include reducing portion sizes and decreasing overall calorie intake  Exercise recommendations include moderate aerobic physical activity for 150 minutes/week  Subjective:   Chief Complaint   Patient presents with    Follow-up     3 m f/u visit for hypothyroidism,  review labs from 6/25/21  Recommended DEXA scan and colonoscopy 5 yr f/u for colon polyps with Dr Izabel Bland  Patient ID: Estela Hayes is a 64 y o  female  HPI     F/u for HTN, HLD, Hypothyroidism and  Rosacea  She has been reluctant to take medication for her cholesterol  She has always had high levels of HDL so she has been reluctant to take her meds  HTN however is much better controlled on her meds  She is agreeable to follow up colonoscopy      The following portions of the patient's history were reviewed and updated as appropriate: past family history, past medical history, past social history, past surgical history and problem list     Review of Systems   HENT: Positive for postnasal drip  Skin: Positive for rash  Rosacea   Allergic/Immunologic: Positive for environmental allergies           Past Medical History:   Diagnosis Date    Disease of thyroid gland     Osteopenia     last assessed 12/17/13    Vaginal atrophy          Current Outpatient Medications:     candesartan (ATACAND) 16 mg tablet, Take 1 tablet (16 mg total) by mouth daily, Disp: 90 tablet, Rfl: 1    Cholecalciferol (VITAMIN D3) 2000 units CHEW, Chew 2 each daily , Disp: , Rfl:     Flaxseed, Linseed, (FLAX SEED OIL) 1300 MG CAPS, Take 1 capsule by mouth daily, Disp: , Rfl:     mometasone (ELOCON) 0 1 % cream, Apply topically daily as needed (allergies), Disp: 45 g, Rfl: 0    Omega-3 Fatty Acids (FISH OIL) 645 MG CAPS, Take 1 capsule by mouth daily, Disp: , Rfl:     Synthroid 75 MCG tablet, TAKE 1 TABLET DAILY, Disp: 90 tablet, Rfl: 1    Allergies   Allergen Reactions    Other      Seasonal allergies        Social History   Past Surgical History:   Procedure Laterality Date    DILATION AND CURETTAGE OF UTERUS      TUBAL LIGATION       Family History   Problem Relation Age of Onset    Lymphoma Mother 79        acute    Hyperlipidemia Father     Peripheral vascular disease Father     Breast cancer Sister     Leukemia Brother     Other Other         4 children living       Objective:  /76   Pulse 69   Temp 97 6 °F (36 4 °C) (Tympanic)   Ht 5' 3 39" (1 61 m)   Wt 63 2 kg (139 lb 6 4 oz)   SpO2 98%   BMI 24 39 kg/m²     Recent Results (from the past 1344 hour(s))   UA w Reflex to Microscopic w Reflex to Culture    Collection Time: 06/25/21  7:15 AM    Specimen: Urine   Result Value Ref Range    Color, UA Yellow     Clarity, UA Clear     Specific Gravity, UA 1 020 1 003 - 1 030    pH, UA 6 0 4 5, 5 0, 5 5, 6 0, 6 5, 7 0, 7 5, 8 0    Leukocytes, UA Negative Negative    Nitrite, UA Negative Negative    Protein, UA 30 (1+) (A) Negative mg/dl    Glucose, UA Negative Negative mg/dl    Ketones, UA Negative Negative mg/dl    Urobilinogen, UA 0 2 0 2, 1 0 E U /dl E U /dl    Bilirubin, UA Negative Negative    Blood, UA Negative Negative   CBC and differential    Collection Time: 06/25/21  7:15 AM   Result Value Ref Range    WBC 3 34 (L) 4 31 - 10 16 Thousand/uL    RBC 3 89 3 81 - 5 12 Million/uL    Hemoglobin 12 6 11 5 - 15 4 g/dL    Hematocrit 39 1 34 8 - 46 1 %     (H) 82 - 98 fL    MCH 32 4 26 8 - 34 3 pg    MCHC 32 2 31 4 - 37 4 g/dL    RDW 13 6 11 6 - 15 1 %    MPV 9 9 8 9 - 12 7 fL    Platelets 164 830 - 539 Thousands/uL    nRBC 0 /100 WBCs    Neutrophils Relative 38 (L) 43 - 75 %    Immat GRANS % 0 0 - 2 %    Lymphocytes Relative 37 14 - 44 %    Monocytes Relative 15 (H) 4 - 12 %    Eosinophils Relative 9 (H) 0 - 6 %    Basophils Relative 1 0 - 1 %    Neutrophils Absolute 1 27 (L) 1 85 - 7 62 Thousands/µL    Immature Grans Absolute 0 00 0 00 - 0 20 Thousand/uL    Lymphocytes Absolute 1 24 0 60 - 4 47 Thousands/µL    Monocytes Absolute 0 49 0 17 - 1 22 Thousand/µL    Eosinophils Absolute 0 31 0 00 - 0 61 Thousand/µL    Basophils Absolute 0 03 0 00 - 0 10 Thousands/µL   Comprehensive metabolic panel    Collection Time: 06/25/21  7:15 AM   Result Value Ref Range    Sodium 135 (L) 136 - 145 mmol/L    Potassium 4 3 3 5 - 5 3 mmol/L    Chloride 103 100 - 108 mmol/L    CO2 25 21 - 32 mmol/L    ANION GAP 7 4 - 13 mmol/L    BUN 11 5 - 25 mg/dL    Creatinine 0 76 0 60 - 1 30 mg/dL    Glucose, Fasting 84 65 - 99 mg/dL    Calcium 9 6 8 3 - 10 1 mg/dL    AST 18 5 - 45 U/L    ALT 22 12 - 78 U/L    Alkaline Phosphatase 66 46 - 116 U/L    Total Protein 7 4 6 4 - 8 2 g/dL    Albumin 3 6 3 5 - 5 0 g/dL    Total Bilirubin 0 70 0 20 - 1 00 mg/dL    eGFR 75 ml/min/1 73sq m   Hemoglobin A1C    Collection Time: 06/25/21  7:15 AM   Result Value Ref Range    Hemoglobin A1C 5 4 Normal 3 8-5 6%; PreDiabetic 5 7-6 4%;  Diabetic >=6 5%; Glycemic control for adults with diabetes <7 0% %     mg/dl   Lipid Panel with Direct LDL reflex    Collection Time: 06/25/21  7:15 AM   Result Value Ref Range    Cholesterol 304 (H) 50 - 200 mg/dL    Triglycerides 65 <=150 mg/dL    HDL, Direct 121 >=40 mg/dL    LDL Calculated 170 (H) 0 - 100 mg/dL   TSH, 3rd generation with Free T4 reflex    Collection Time: 06/25/21  7:15 AM   Result Value Ref Range    TSH 3RD GENERATON 0 828 0 358 - 3 740 uIU/mL   Vitamin D 25 hydroxy    Collection Time: 06/25/21  7:15 AM   Result Value Ref Range    Vit D, 25-Hydroxy 55 9 30 0 - 100 0 ng/mL   Urine Microscopic    Collection Time: 06/25/21  7:15 AM   Result Value Ref Range    RBC, UA None Seen None Seen, 2-4 /hpf    WBC, UA None Seen None Seen, 2-4 /hpf    Epithelial Cells Occasional None Seen, Occasional /hpf    Bacteria, UA Occasional None Seen, Occasional /hpf            Physical Exam      Gen: NAD  Heent: atraumatic, normocephalic  Mouth: is moist  Neck: is supple, no JVD, no carotid bruits     Heart: is regular Normal s1, s2,  Lungs: are clear to auscultation  Abd: soft, non tender, NABS  Ext: no edema, distal pulses normal  Skin: no rashes, ulcers  Mood: normal affect  Neuro: AAOx3

## 2021-06-30 NOTE — PATIENT INSTRUCTIONS
Rosuvastatin 10 mg a day with dinner  Watch for leg cramps or joint pain or back pain  Stop the medication if you have side effects  Continue present lifestyle  Blood work is good all your vital signs look good  Recheck blood work prior to next visit to check cholesterol and hepatic function  Schedule bone density testing  Continue vitamin-D supplementation levels are good    Patient is also due for colonoscopy and referral was provided

## 2021-06-30 NOTE — PROGRESS NOTES
Assessment and Plan:     Problem List Items Addressed This Visit        Endocrine    Hypothyroid - Primary    Relevant Medications    levothyroxine (Synthroid) 75 mcg tablet       Cardiovascular and Mediastinum    Hypertension, essential     Will need to increase med dosage in addition to lifestyle and weight loss         Relevant Medications    candesartan (ATACAND) 16 mg tablet    Other Relevant Orders    Comprehensive metabolic panel       Other    Hypercholesterolemia     Recommend compliance with medication         Relevant Medications    rosuvastatin (CRESTOR) 10 MG tablet    Other Relevant Orders    Lipid Panel with Direct LDL reflex    Vitamin B12 deficiency    Vitamin D deficiency    Relevant Orders    DXA bone density spine hip and pelvis      Other Visit Diagnoses     History of colon polyps        Relevant Orders    Ambulatory referral to Colorectal Surgery    Need for hepatitis C screening test        Relevant Orders    Hepatitis C antibody    Iodine hypothyroidism        Relevant Medications    levothyroxine (Synthroid) 75 mcg tablet    Other specified disorders of bone density and structure, left ankle and foot         Relevant Orders    DXA bone density spine hip and pelvis           Preventive health issues were discussed with patient, and age appropriate screening tests were ordered as noted in patient's After Visit Summary  Personalized health advice and appropriate referrals for health education or preventive services given if needed, as noted in patient's After Visit Summary       History of Present Illness:     Patient presents for Medicare Annual Wellness visit    Patient Care Team:  Piero Cr MD as PCP - General     Problem List:     Patient Active Problem List   Diagnosis    Atrophic vaginitis    Hypercholesterolemia    Hypertension, essential    Rosacea    Vitamin B12 deficiency    Vitamin D deficiency    Hypothyroid    Syncope and collapse    LYLE [facility]acute kidney injury) ([facility] [address] )  Dehydration after exertion      Past Medical and Surgical History:     Past Medical History:   Diagnosis Date    Disease of thyroid gland     Hypertension     Osteopenia     last assessed 13    Vaginal atrophy      Past Surgical History:   Procedure Laterality Date    DILATION AND CURETTAGE OF UTERUS      TUBAL LIGATION        Family History:     Family History   Problem Relation Age of Onset    Lymphoma Mother 79        acute    Hyperlipidemia Father     Peripheral vascular disease Father     Breast cancer Sister     Leukemia Brother     Other Other         4 children living      Social History:     Social History     Socioeconomic History    Marital status:      Spouse name: None    Number of children: None    Years of education: None    Highest education level: None   Occupational History    Occupation: [employer]   Tobacco Use    Smoking status: Former Smoker     Packs/day: 0 25     Years: 8 00     Pack years: 2 00     Types: Cigarettes     Quit date:      Years since quittin 5    Smokeless tobacco: Never Used    Tobacco comment: about 39 years ago    Vaping Use    Vaping Use: Never used   Substance and Sexual Activity    Alcohol use: Yes     Comment: rare occasion    Drug use: No    Sexual activity: Not Currently   Other Topics Concern    None   Social History Narrative    Travel    She enjoys cooking    Denied hx of exercise habits     Social Determinants of Health     Financial Resource Strain:     Difficulty of Paying Living Expenses:    Food Insecurity:     Worried About Running Out of Food in the Last Year:     Ran Out of Food in the Last Year:    Transportation Needs:     Lack of Transportation (Medical):      Lack of Transportation (Non-Medical):    Physical Activity:     Days of Exercise per Week:     Minutes of Exercise per Session:    Stress:     Feeling of Stress :    Social Connections:     Frequency of Communication with Friends and Family:  Frequency of Social Gatherings with Friends and Family:     Attends Gnosticist Services:     Active Member of Clubs or Organizations:     Attends Club or Organization Meetings:     Marital Status:    Intimate Partner Violence:     Fear of Current or Ex-Partner:     Emotionally Abused:     Physically Abused:     Sexually Abused:       Medications and Allergies:     Current Outpatient Medications   Medication Sig Dispense Refill    candesartan (ATACAND) 16 mg tablet Take 1 tablet (16 mg total) by mouth daily 90 tablet 1    Cholecalciferol (VITAMIN D3) 2000 units CHEW Chew 2 each daily       Flaxseed, Linseed, (FLAX SEED OIL) 1300 MG CAPS Take 1 capsule by mouth daily      mometasone (ELOCON) 0 1 % cream Apply topically daily as needed (allergies) 45 g 0    Omega-3 Fatty Acids (FISH OIL) 645 MG CAPS Take 1 capsule by mouth daily      Synthroid 75 MCG tablet TAKE 1 TABLET DAILY 90 tablet 1     No current facility-administered medications for this visit  Allergies   Allergen Reactions    Other      Seasonal allergies       Immunizations:     Immunization History   Administered Date(s) Administered    SARS-CoV-2 / COVID-19 mRNA IM (Pfizer-BioNTech) 01/19/2021, 02/08/2021    Tdap 11/24/2013      Health Maintenance:         Topic Date Due    Hepatitis C Screening  Never done    DXA SCAN  10/17/2014    Colorectal Cancer Screening  02/04/2021         Topic Date Due    Pneumococcal Vaccine: 65+ Years (1 of 1 - PPSV23) Never done    Influenza Vaccine (Season Ended) 09/01/2021      Medicare Health Risk Assessment:     /76   Pulse 69   Temp 97 6 °F (36 4 °C) (Tympanic)   Ht 5' 3 39" (1 61 m)   Wt 63 2 kg (139 lb 6 4 oz)   SpO2 98%   BMI 24 39 kg/m²      Lexis Hutchinson is here for her Subsequent Wellness visit  Last Medicare Wellness visit information reviewed, patient interviewed and updates made to the record today  Health Risk Assessment:   Patient rates overall health as very good   Patient feels that their physical health rating is slightly better  Patient is very satisfied with their life  Eyesight was rated as same  Hearing was rated as same  Patient feels that their emotional and mental health rating is much better  Patients states they are never, rarely angry  Patient states they are never, rarely unusually tired/fatigued  Pain experienced in the last 7 days has been none  Patient states that she has experienced no weight loss or gain in last 6 months  Depression Screening:   PHQ-2 Score: 0      Fall Risk Screening: In the past year, patient has experienced: no history of falling in past year      Urinary Incontinence Screening:   Patient has not leaked urine accidently in the last six months  Home Safety:  Patient does not have trouble with stairs inside or outside of their home  Patient has working smoke alarms and has working carbon monoxide detector  Home safety hazards include: none  Nutrition:   Current diet is No Added Salt  Healthy Diet    Medications:   Patient is not currently taking any over-the-counter supplements  Patient is able to manage medications  Activities of Daily Living (ADLs)/Instrumental Activities of Daily Living (IADLs):   Walk and transfer into and out of bed and chair?: Yes  Dress and groom yourself?: Yes    Bathe or shower yourself?: Yes    Feed yourself? Yes  Do your laundry/housekeeping?: Yes  Manage your money, pay your bills and track your expenses?: Yes  Make your own meals?: Yes    Do your own shopping?: Yes    Durable Medical Equipment Suppliers  no preference    Previous Hospitalizations:   Any hospitalizations or ED visits within the last 12 months?: Yes    How many hospitalizations have you had in the last year?: 1-2    Advance Care Planning:   Living will: Yes    Durable POA for healthcare:  Yes    Advanced directive: Yes      PREVENTIVE SCREENINGS      Cardiovascular Screening:    General: Screening Not Indicated and History Lipid Disorder      Diabetes Screening:     General: Screening Current      Breast Cancer Screening:     General: Screening Current      Cervical Cancer Screening:    General: Screening Not Indicated      Lung Cancer Screening:     General: Screening Not Indicated    Screening, Brief Intervention, and Referral to Treatment (SBIRT)    Screening  Typical number of drinks in a day: 0  Typical number of drinks in a week: 0  Interpretation: Low risk drinking behavior      Single Item Drug Screening:  How often have you used an illegal drug (including marijuana) or a prescription medication for non-medical reasons in the past year? never    Single Item Drug Screen Score: 0  Interpretation: Negative screen for possible drug use disorder      Evelin Rousseau MD

## 2021-08-16 ENCOUNTER — TELEPHONE (OUTPATIENT)
Dept: INTERNAL MEDICINE CLINIC | Facility: CLINIC | Age: 76
End: 2021-08-16

## 2021-08-16 DIAGNOSIS — I10 HYPERTENSION, ESSENTIAL: ICD-10-CM

## 2021-08-16 DIAGNOSIS — R55 SYNCOPE AND COLLAPSE: Primary | ICD-10-CM

## 2021-08-16 RX ORDER — CANDESARTAN 16 MG/1
8 TABLET ORAL DAILY
Qty: 90 TABLET | Refills: 1
Start: 2021-08-16

## 2021-08-16 NOTE — TELEPHONE ENCOUNTER
Called patient back and informed of Elizabeth's recommendation  Patient understood  Still resistant to set up appointment  Wishes to speak with Dr Lucy Rashid since she was seen recently  Patient requesting if Dr Lucy Rashid could call her at [phone]  Did explain to patient that Dr Lucy Rashid is out of the office until Wednesday  Patient's daughter did set up appointment for patient with Dr Lucy Rashid this Wednesday, 8/18/2021 @ 11am just in case Dr Lucy Rashid agrees that she should have appointment

## 2021-08-16 NOTE — TELEPHONE ENCOUNTER
She can hold her BP medication this morning, but she really should be seen in the office  There are a lot of factors that could be playing a role and she should be seen by a provider

## 2021-08-16 NOTE — TELEPHONE ENCOUNTER
Patient seen in [facility] ED yesterday for syncopal episode  Patient was walking around at [facility]   ED informed patient that they are unsure if her blood pressure drop and syncopal episode were due from her actual blood pressure or if it was from the exercise and heat from the day  Daughter called this morning to verify if patient should take blood pressure medication this morning  Offered a 3pm appointment with Dr Get Galloway to discuss but daughter declined  Patient's blood pressure was 116/60 at 8:45am this morning  Please advise if patient should take blood pressure medication that is due this morning  Please call daughter back at [phone] Jovan Fung

## 2021-08-17 NOTE — TELEPHONE ENCOUNTER
Мария Naik, patient's daughter called back this morning and wanted to get clarification on the medication instructions and speak with you  She did get some information from her mother but wanted to clarify that it is correct  She is sorry she missed your call  She can be reached back at [phone]

## 2021-08-17 NOTE — TELEPHONE ENCOUNTER
Pt was called at 9 pm on 08/16/2021  Pt seen in the ED for dehydration hypotension and syncope  [place] [facility] [place]  And although she was not out in the sun it was a fairly hot day  She had a witnessed syncope and the paramedics had to be called  Blood pressure taken by the medics was very low and she had to be taken to [facility]  ED for IV fluids  her BP medication had recently be increased  At the time she was under lot of stress as her  had  recently passed  she has been exercising very regularly and had not recently monitor blood pressure  she was recommended to reduce her blood pressure medication back to 8 mg daily (half tablet of 16 mg )  I will also be ordering a CT angio to assess her circulation since this is syncope x2    She was advised to make a follow up if symptoms of dizziness persist   She had a 2D Echo done earlier this year which was normal  Will also have her follow up with Cardiology

## 2021-08-18 ENCOUNTER — OFFICE VISIT (OUTPATIENT)
Dept: INTERNAL MEDICINE CLINIC | Facility: CLINIC | Age: 76
End: 2021-08-18
Payer: MEDICARE

## 2021-08-18 VITALS
BODY MASS INDEX: 24.66 KG/M2 | TEMPERATURE: 97.7 F | SYSTOLIC BLOOD PRESSURE: 134 MMHG | DIASTOLIC BLOOD PRESSURE: 70 MMHG | OXYGEN SATURATION: 97 % | HEART RATE: 81 BPM | HEIGHT: 63 IN | WEIGHT: 139.2 LBS

## 2021-08-18 DIAGNOSIS — Z86.010 HISTORY OF COLON POLYPS: ICD-10-CM

## 2021-08-18 DIAGNOSIS — D64.9 ANEMIA, UNSPECIFIED TYPE: ICD-10-CM

## 2021-08-18 DIAGNOSIS — E53.8 B12 DEFICIENCY: ICD-10-CM

## 2021-08-18 DIAGNOSIS — I10 HYPERTENSION, ESSENTIAL: ICD-10-CM

## 2021-08-18 DIAGNOSIS — E78.00 HYPERCHOLESTEROLEMIA: Primary | ICD-10-CM

## 2021-08-18 DIAGNOSIS — Z12.31 ENCOUNTER FOR SCREENING MAMMOGRAM FOR MALIGNANT NEOPLASM OF BREAST: ICD-10-CM

## 2021-08-18 PROCEDURE — 99214 OFFICE O/P EST MOD 30 MIN: CPT | Performed by: INTERNAL MEDICINE

## 2021-08-18 NOTE — PROGRESS NOTES
Assessment/Plan:    I spent 25 minutes directly with the patient  and her daughterduring this visit  today in which greater than 50% of this time was spent in counseling/coordination of care regarding Diagnostic results, Prognosis, Risks and benefits of tx options, Intructions for management, Patient and family education, Importance of tx compliance, Risk factor reductions and Impressions    Syncope and collapse   Possibly  Vaso vagal in nature   And combination of exhaustion and dehydration with a recent increase in her blood pressure medication earlier on in the year  However there is also a decrease in her hematocrit from her usual baseline  Will check  Iron and B12 levels  Hypertension, essential  Hold off on restarting antihypertensives at the present time  Parameters for resuming medication discussed target blood pressure is 120/80 however to not restart medication unless systolic is greater than 859 and diastolic is greater than 85                Diagnoses and all orders for this visit:    Hypercholesterolemia  -     Lipid Panel with Direct LDL reflex; Future    Encounter for screening mammogram for malignant neoplasm of breast  -     Mammo screening bilateral w 3d & cad; Future    History of colon polyps  -     Ambulatory referral to Gastroenterology; Future    B12 deficiency  -     Methylmalonic acid, serum; Future    Anemia, unspecified type  -     Iron Panel (Includes Ferritin, Iron Sat%, Iron, and TIBC); Future    Hypertension, essential    Other orders  -     COD LIVER OIL PO; Take by mouth 1 tsp daily          Subjective:   Chief Complaint   Patient presents with    Follow-up     3 day f/u visit after ER visit 8/15/21 at [facility] for syncopal episode and decreased BP   Health Maintenance     Recommended mammogram after 11/2/21, DEXA and colonoscopy with Dr Marie Mendoza - 5 yr f/u for hx colon polyps  Patient ID: Blayne Romero is a 64 y o  female      HPI     Pt was called at 9 pm on 08/16/2021  Pt seen in the ED for dehydration hypotension and syncope  She was At [employer] [facility]  And although she was not out in the sun it was a fairly hot day  She had a witnessed syncope and the paramedics had to be called  Blood pressure taken by the medics was very low and she had to be taken to [facility]  ED for IV fluids  her BP medication had recently be increased  At the time she was under lot of stress as her  had  recently passed  she has been exercising very regularly and had not recently monitor blood pressure  she was recommended to reduce her blood pressure medication back to 8 mg daily (half tablet of 16 mg )  I will also be ordering a CT angio to assess her circulation since this is syncope x2  She was advised to make a follow up if symptoms of dizziness persist   She had a 2D Echo done earlier this year which was normal  Will also have her follow up with Cardiology         The following portions of the patient's history were reviewed and updated as appropriate: past family history, past medical history, past social history, past surgical history and problem list     Review of Systems   Allergic/Immunologic: Positive for environmental allergies  Neurological: Positive for light-headedness  All other systems reviewed and are negative          Past Medical History:   Diagnosis Date    Disease of thyroid gland     Hypertension     Osteopenia     last assessed 12/17/13    Vaginal atrophy          Current Outpatient Medications:     Cholecalciferol (VITAMIN D3) 2000 units CHEW, Chew 2 each daily , Disp: , Rfl:     COD LIVER OIL PO, Take by mouth 1 tsp daily, Disp: , Rfl:     levothyroxine (Synthroid) 75 mcg tablet, Take 1 tablet (75 mcg total) by mouth daily, Disp: 90 tablet, Rfl: 1    mometasone (ELOCON) 0 1 % cream, Apply topically daily as needed (allergies), Disp: 45 g, Rfl: 0    rosuvastatin (CRESTOR) 10 MG tablet, Take 1 tablet (10 mg total) by mouth daily, Disp: 30 tablet, Rfl: 5    candesartan (ATACAND) 16 mg tablet, Take 0 5 tablets (8 mg total) by mouth daily (Patient not taking: Reported on 8/18/2021), Disp: 90 tablet, Rfl: 1    Allergies   Allergen Reactions    Other      Seasonal allergies        Social History   Past Surgical History:   Procedure Laterality Date    DILATION AND CURETTAGE OF UTERUS      TUBAL LIGATION       Family History   Problem Relation Age of Onset    Lymphoma Mother 79        acute    Hyperlipidemia Father     Peripheral vascular disease Father     Breast cancer Sister     Leukemia Brother     Other Other         4 children living       Objective:  /70   Pulse 81   Temp 97 7 °F (36 5 °C) (Tympanic)   Ht 5' 3 11" (1 603 m)   Wt 63 1 kg (139 lb 3 2 oz)   SpO2 97%   BMI 24 57 kg/m²     Recent Results (from the past 1344 hour(s))   UA w Reflex to Microscopic w Reflex to Culture    Collection Time: 06/25/21  7:15 AM    Specimen: Urine   Result Value Ref Range    Color, UA Yellow     Clarity, UA Clear     Specific Gravity, UA 1 020 1 003 - 1 030    pH, UA 6 0 4 5, 5 0, 5 5, 6 0, 6 5, 7 0, 7 5, 8 0    Leukocytes, UA Negative Negative    Nitrite, UA Negative Negative    Protein, UA 30 (1+) (A) Negative mg/dl    Glucose, UA Negative Negative mg/dl    Ketones, UA Negative Negative mg/dl    Urobilinogen, UA 0 2 0 2, 1 0 E U /dl E U /dl    Bilirubin, UA Negative Negative    Blood, UA Negative Negative   CBC and differential    Collection Time: 06/25/21  7:15 AM   Result Value Ref Range    WBC 3 34 (L) 4 31 - 10 16 Thousand/uL    RBC 3 89 3 81 - 5 12 Million/uL    Hemoglobin 12 6 11 5 - 15 4 g/dL    Hematocrit 39 1 34 8 - 46 1 %     (H) 82 - 98 fL    MCH 32 4 26 8 - 34 3 pg    MCHC 32 2 31 4 - 37 4 g/dL    RDW 13 6 11 6 - 15 1 %    MPV 9 9 8 9 - 12 7 fL    Platelets 672 925 - 271 Thousands/uL    nRBC 0 /100 WBCs    Neutrophils Relative 38 (L) 43 - 75 %    Immat GRANS % 0 0 - 2 %    Lymphocytes Relative 37 14 - 44 %    Monocytes Relative 15 (H) 4 - 12 %    Eosinophils Relative 9 (H) 0 - 6 %    Basophils Relative 1 0 - 1 %    Neutrophils Absolute 1 27 (L) 1 85 - 7 62 Thousands/µL    Immature Grans Absolute 0 00 0 00 - 0 20 Thousand/uL    Lymphocytes Absolute 1 24 0 60 - 4 47 Thousands/µL    Monocytes Absolute 0 49 0 17 - 1 22 Thousand/µL    Eosinophils Absolute 0 31 0 00 - 0 61 Thousand/µL    Basophils Absolute 0 03 0 00 - 0 10 Thousands/µL   Comprehensive metabolic panel    Collection Time: 06/25/21  7:15 AM   Result Value Ref Range    Sodium 135 (L) 136 - 145 mmol/L    Potassium 4 3 3 5 - 5 3 mmol/L    Chloride 103 100 - 108 mmol/L    CO2 25 21 - 32 mmol/L    ANION GAP 7 4 - 13 mmol/L    BUN 11 5 - 25 mg/dL    Creatinine 0 76 0 60 - 1 30 mg/dL    Glucose, Fasting 84 65 - 99 mg/dL    Calcium 9 6 8 3 - 10 1 mg/dL    AST 18 5 - 45 U/L    ALT 22 12 - 78 U/L    Alkaline Phosphatase 66 46 - 116 U/L    Total Protein 7 4 6 4 - 8 2 g/dL    Albumin 3 6 3 5 - 5 0 g/dL    Total Bilirubin 0 70 0 20 - 1 00 mg/dL    eGFR 75 ml/min/1 73sq m   Hemoglobin A1C    Collection Time: 06/25/21  7:15 AM   Result Value Ref Range    Hemoglobin A1C 5 4 Normal 3 8-5 6%; PreDiabetic 5 7-6 4%;  Diabetic >=6 5%; Glycemic control for adults with diabetes <7 0% %     mg/dl   Lipid Panel with Direct LDL reflex    Collection Time: 06/25/21  7:15 AM   Result Value Ref Range    Cholesterol 304 (H) 50 - 200 mg/dL    Triglycerides 65 <=150 mg/dL    HDL, Direct 121 >=40 mg/dL    LDL Calculated 170 (H) 0 - 100 mg/dL   TSH, 3rd generation with Free T4 reflex    Collection Time: 06/25/21  7:15 AM   Result Value Ref Range    TSH 3RD GENERATON 0 828 0 358 - 3 740 uIU/mL   Vitamin D 25 hydroxy    Collection Time: 06/25/21  7:15 AM   Result Value Ref Range    Vit D, 25-Hydroxy 55 9 30 0 - 100 0 ng/mL   Urine Microscopic    Collection Time: 06/25/21  7:15 AM   Result Value Ref Range    RBC, UA None Seen None Seen, 2-4 /hpf    WBC, UA None Seen None Seen, 2-4 /hpf    Epithelial Cells Occasional None Seen, Occasional /hpf    Bacteria, UA Occasional None Seen, Occasional /hpf            Physical Exam      Gen: NAD  Heent: atraumatic, normocephalic  Mouth: is moist  Neck: is supple, no JVD, no carotid bruits     Heart: is regular Normal s1, s2,  Lungs: are clear to auscultation  Abd: soft, non tender, NABS  Ext: no edema, distal pulses normal  Skin: no rashes, ulcers  Mood: normal affect  Neuro: AAOx3

## 2021-08-18 NOTE — ASSESSMENT & PLAN NOTE
Hold off on restarting antihypertensives at the present time    Parameters for resuming medication discussed target blood pressure is 120/80 however to not restart medication unless systolic is greater than 815 and diastolic is greater than 85

## 2021-08-18 NOTE — PATIENT INSTRUCTIONS
CTS scheduled for tomorrow  Recheck CBC, iron panel, MMA and lipid level  Hold off on restarting antihypertensives at the present time    Parameters for resuming medication discussed target blood pressure is 120/80 however to not restart medication unless systolic is greater than 407 and diastolic is greater than 85

## 2021-08-18 NOTE — ASSESSMENT & PLAN NOTE
Possibly  Vaso vagal in nature   And combination of exhaustion and dehydration with a recent increase in her blood pressure medication earlier on in the year  However there is also a decrease in her hematocrit from her usual baseline  Will check  Iron and B12 levels

## 2021-08-19 ENCOUNTER — HOSPITAL ENCOUNTER (OUTPATIENT)
Dept: RADIOLOGY | Facility: HOSPITAL | Age: 76
Discharge: HOME | End: 2021-08-19
Attending: INTERNAL MEDICINE
Payer: MEDICARE

## 2021-08-19 ENCOUNTER — APPOINTMENT (OUTPATIENT)
Dept: LAB | Facility: HOSPITAL | Age: 76
End: 2021-08-19
Attending: INTERNAL MEDICINE
Payer: MEDICARE

## 2021-08-19 DIAGNOSIS — Z11.59 NEED FOR HEPATITIS C SCREENING TEST: ICD-10-CM

## 2021-08-19 DIAGNOSIS — E53.8 B12 DEFICIENCY: ICD-10-CM

## 2021-08-19 DIAGNOSIS — I10 HYPERTENSION, ESSENTIAL: ICD-10-CM

## 2021-08-19 DIAGNOSIS — E78.00 HYPERCHOLESTEROLEMIA: ICD-10-CM

## 2021-08-19 DIAGNOSIS — D64.9 ANEMIA, UNSPECIFIED TYPE: ICD-10-CM

## 2021-08-19 DIAGNOSIS — R55 SYNCOPE AND COLLAPSE: ICD-10-CM

## 2021-08-19 LAB
ALBUMIN SERPL BCP-MCNC: 3.7 G/DL (ref 3.5–5)
ALP SERPL-CCNC: 65 U/L (ref 46–116)
ALT SERPL W P-5'-P-CCNC: 25 U/L (ref 12–78)
ANION GAP SERPL CALCULATED.3IONS-SCNC: 3 MMOL/L (ref 4–13)
AST SERPL W P-5'-P-CCNC: 24 U/L (ref 5–45)
BILIRUB SERPL-MCNC: 0.51 MG/DL (ref 0.2–1)
BUN SERPL-MCNC: 9 MG/DL (ref 5–25)
CALCIUM SERPL-MCNC: 9 MG/DL (ref 8.3–10.1)
CHLORIDE SERPL-SCNC: 104 MMOL/L (ref 100–108)
CHOLEST SERPL-MCNC: 196 MG/DL (ref 50–200)
CO2 SERPL-SCNC: 26 MMOL/L (ref 21–32)
CREAT SERPL-MCNC: 0.73 MG/DL (ref 0.6–1.3)
FERRITIN SERPL-MCNC: 42 NG/ML (ref 8–388)
GFR SERPL CREATININE-BSD FRML MDRD: 79 ML/MIN/1.73SQ M
GLUCOSE P FAST SERPL-MCNC: 86 MG/DL (ref 65–99)
HCV AB SER QL: NORMAL
HDLC SERPL-MCNC: 124 MG/DL
IRON SATN MFR SERPL: 24 %
IRON SERPL-MCNC: 47 UG/DL (ref 50–170)
LDLC SERPL CALC-MCNC: 59 MG/DL (ref 0–100)
POTASSIUM SERPL-SCNC: 4.3 MMOL/L (ref 3.5–5.3)
PROT SERPL-MCNC: 7.1 G/DL (ref 6.4–8.2)
SODIUM SERPL-SCNC: 133 MMOL/L (ref 136–145)
TIBC SERPL-MCNC: 199 UG/DL (ref 250–450)
TRIGL SERPL-MCNC: 67 MG/DL

## 2021-08-19 PROCEDURE — 83918 ORGANIC ACIDS TOTAL QUANT: CPT

## 2021-08-19 PROCEDURE — 83550 IRON BINDING TEST: CPT

## 2021-08-19 PROCEDURE — 86803 HEPATITIS C AB TEST: CPT

## 2021-08-19 PROCEDURE — G1004 CDSM NDSC: HCPCS

## 2021-08-19 PROCEDURE — 80053 COMPREHEN METABOLIC PANEL: CPT

## 2021-08-19 PROCEDURE — 83540 ASSAY OF IRON: CPT

## 2021-08-19 PROCEDURE — 70496 CT ANGIOGRAPHY HEAD: CPT

## 2021-08-19 PROCEDURE — 82728 ASSAY OF FERRITIN: CPT

## 2021-08-19 PROCEDURE — 70498 CT ANGIOGRAPHY NECK: CPT

## 2021-08-19 PROCEDURE — 36415 COLL VENOUS BLD VENIPUNCTURE: CPT

## 2021-08-19 PROCEDURE — 80061 LIPID PANEL: CPT

## 2021-08-19 RX ADMIN — IOHEXOL 85 ML: 350 INJECTION, SOLUTION INTRAVENOUS at 08:58

## 2021-08-22 LAB
METHYLMALONATE SERPL-SCNC: 85 NMOL/L (ref 0–378)
SL AMB DISCLAIMER: NORMAL

## 2021-08-23 ENCOUNTER — TELEPHONE (OUTPATIENT)
Dept: INTERNAL MEDICINE CLINIC | Facility: CLINIC | Age: 76
End: 2021-08-23

## 2021-08-23 NOTE — TELEPHONE ENCOUNTER
Results show   " IMPRESSION:     1  Unremarkable CT of the brain  2   Patent major vasculature of [place] of sommer without high-grade stenosis  No aneurysm  3   No hemodynamically significant stenosis of cervical carotid and vertebral arteries  Mild atherosclerotic disease of cervical carotid bifurcation  4   Incidental left upper lobe 2 mm nodule  Based on current Fleischner Society 2017 Guidelines on incidental pulmonary nodule, no routine follow-up is needed if the patient is considered low risk for lung cancer  If the patient is considered high risk   for lung cancer, 12 month follow-up non-contrast chest CT is recommended      Stable to leave for review by Dr Naa Rivera

## 2021-08-23 NOTE — TELEPHONE ENCOUNTER
[facility] radiology read room called and wanted to relay that there are signficant findings on the CTA head anc neck w wo contrast that was completed on 08/19/21  Please advise

## 2021-08-24 ENCOUNTER — TELEPHONE (OUTPATIENT)
Dept: INTERNAL MEDICINE CLINIC | Facility: CLINIC | Age: 76
End: 2021-08-24

## 2021-08-24 DIAGNOSIS — K63.5 POLYP OF COLON, UNSPECIFIED PART OF COLON, UNSPECIFIED TYPE: ICD-10-CM

## 2021-08-24 DIAGNOSIS — R91.1 INCIDENTAL LUNG NODULE: Primary | ICD-10-CM

## 2021-08-24 DIAGNOSIS — D50.0 IRON DEFICIENCY ANEMIA DUE TO CHRONIC BLOOD LOSS: ICD-10-CM

## 2021-08-24 NOTE — TELEPHONE ENCOUNTER
[place] called and wanted to discuss the results of the bw that her mother had completed on 08/19 as well as the CTA head and neck w wo contrast that was completed on 08/19  If you can please reach back out to the daughter, requested to speak with you directly  Daughters phone number is [phone]

## 2021-08-25 NOTE — TELEPHONE ENCOUNTER
Patient's daughter called back and just wanted an update as she hasn't heard anything back yet  I informed her I would put a message in, informed that with the provider being out of the office there was no definite time frame I could give her

## 2021-09-08 ENCOUNTER — HOSPITAL ENCOUNTER (OUTPATIENT)
Dept: RADIOLOGY | Facility: HOSPITAL | Age: 76
Discharge: HOME | End: 2021-09-08
Attending: INTERNAL MEDICINE
Payer: MEDICARE

## 2021-09-08 DIAGNOSIS — R91.1 INCIDENTAL LUNG NODULE: ICD-10-CM

## 2021-09-08 PROCEDURE — G1004 CDSM NDSC: HCPCS

## 2021-09-08 PROCEDURE — 71250 CT THORAX DX C-: CPT

## 2021-09-14 ENCOUNTER — TELEPHONE (OUTPATIENT)
Dept: INTERNAL MEDICINE CLINIC | Facility: CLINIC | Age: 76
End: 2021-09-14

## 2021-09-14 NOTE — TELEPHONE ENCOUNTER
[facility] called to relay that are significant findings on the CT lung nodule follow up that was completed on 09/08  Please advise

## 2021-09-14 NOTE — TELEPHONE ENCOUNTER
IMPRESSION:     6 mm and 2 mm left upper lobe nodules  The 6 mm nodule should be followed up with repeat CT chest in 6-12 months      Large hiatal hernia

## 2021-09-27 ENCOUNTER — TELEPHONE (OUTPATIENT)
Dept: INTERNAL MEDICINE CLINIC | Facility: CLINIC | Age: 76
End: 2021-09-27

## 2021-09-27 DIAGNOSIS — D50.0 IRON DEFICIENCY ANEMIA DUE TO CHRONIC BLOOD LOSS: ICD-10-CM

## 2021-09-27 DIAGNOSIS — E53.8 VITAMIN B12 DEFICIENCY: Primary | ICD-10-CM

## 2021-09-27 NOTE — TELEPHONE ENCOUNTER
Patient daughter was called to reschedule appt for 10/12/2021 for her mother  Patient daughter would like a phone call from Dr Kiera Fitch about lab work and some other tests that were done for her mother     Per Daughter mother only trusts and wants to speak with Dr Kiera Fitch   Appt scheduled with Dr Kiera Fitch for January, does not want to have an appt with any other provider

## 2021-09-29 DIAGNOSIS — D64.9 ANEMIA, UNSPECIFIED TYPE: ICD-10-CM

## 2021-09-29 DIAGNOSIS — K44.9 HIATAL HERNIA: Primary | ICD-10-CM

## 2021-09-29 NOTE — PROGRESS NOTES
Called patient's daughter Vanessa Maciel  Discussed the followin  Iron deficiency anemia/hiatal hernia:  Follow-up with Dr العراقي  For EGD to assess source bleeding  2  Start iron supplementation as prescribed, hold 1 week prior to endoscopy when scheduled  3  Repeat CT scan in 6 months      4  Repeat CBC iron and B12 panel prior to next visit

## 2021-10-26 DIAGNOSIS — D50.0 IRON DEFICIENCY ANEMIA DUE TO CHRONIC BLOOD LOSS: Primary | ICD-10-CM

## 2021-10-27 RX ORDER — FERROUS FUM/VIT C/B12-IF/FOLIC 110-0.5MG
1 CAPSULE ORAL DAILY
Qty: 90 CAPSULE | Refills: 1 | Status: SHIPPED | OUTPATIENT
Start: 2021-10-27 | End: 2022-04-26

## 2021-11-06 ENCOUNTER — IMMUNIZATIONS (OUTPATIENT)
Dept: FAMILY MEDICINE CLINIC | Facility: HOSPITAL | Age: 76
End: 2021-11-06

## 2021-11-06 DIAGNOSIS — Z23 ENCOUNTER FOR IMMUNIZATION: Primary | ICD-10-CM

## 2021-11-06 PROCEDURE — 91300 COVID-19 PFIZER VACC 0.3 ML: CPT

## 2021-11-06 PROCEDURE — 0001A COVID-19 PFIZER VACC 0.3 ML: CPT

## 2021-12-02 ENCOUNTER — CONSULT (OUTPATIENT)
Dept: GASTROENTEROLOGY | Facility: CLINIC | Age: 76
End: 2021-12-02
Payer: MEDICARE

## 2021-12-02 VITALS
BODY MASS INDEX: 24.95 KG/M2 | TEMPERATURE: 95.8 F | HEART RATE: 78 BPM | HEIGHT: 63 IN | SYSTOLIC BLOOD PRESSURE: 154 MMHG | DIASTOLIC BLOOD PRESSURE: 77 MMHG | WEIGHT: 140.8 LBS

## 2021-12-02 DIAGNOSIS — R13.10 DYSPHAGIA, UNSPECIFIED TYPE: ICD-10-CM

## 2021-12-02 DIAGNOSIS — D64.9 ANEMIA, UNSPECIFIED TYPE: ICD-10-CM

## 2021-12-02 DIAGNOSIS — K44.9 HIATAL HERNIA: Primary | ICD-10-CM

## 2021-12-02 PROCEDURE — 99204 OFFICE O/P NEW MOD 45 MIN: CPT | Performed by: INTERNAL MEDICINE

## 2021-12-02 RX ORDER — PANTOPRAZOLE SODIUM 40 MG/1
40 TABLET, DELAYED RELEASE ORAL DAILY
Qty: 30 TABLET | Refills: 3 | Status: SHIPPED | OUTPATIENT
Start: 2021-12-02 | End: 2022-03-28

## 2022-01-10 ENCOUNTER — TELEPHONE (OUTPATIENT)
Dept: INTERNAL MEDICINE CLINIC | Facility: CLINIC | Age: 77
End: 2022-01-10

## 2022-01-10 NOTE — TELEPHONE ENCOUNTER
Patient's daughter was calling regarding blood work for her mother  I didn't see anything in her chart    Will discuss on her visit with Dr Franck Peoples on 01/11/2022

## 2022-01-12 ENCOUNTER — TELEMEDICINE (OUTPATIENT)
Dept: INTERNAL MEDICINE CLINIC | Facility: CLINIC | Age: 77
End: 2022-01-12
Payer: MEDICARE

## 2022-01-12 VITALS
HEART RATE: 83 BPM | BODY MASS INDEX: 24.36 KG/M2 | OXYGEN SATURATION: 96 % | DIASTOLIC BLOOD PRESSURE: 70 MMHG | WEIGHT: 138 LBS | SYSTOLIC BLOOD PRESSURE: 132 MMHG | TEMPERATURE: 98.4 F

## 2022-01-12 DIAGNOSIS — D50.0 IRON DEFICIENCY ANEMIA DUE TO CHRONIC BLOOD LOSS: ICD-10-CM

## 2022-01-12 DIAGNOSIS — E53.8 VITAMIN B12 DEFICIENCY: ICD-10-CM

## 2022-01-12 DIAGNOSIS — E78.00 HYPERCHOLESTEROLEMIA: ICD-10-CM

## 2022-01-12 DIAGNOSIS — I10 HYPERTENSION, ESSENTIAL: ICD-10-CM

## 2022-01-12 DIAGNOSIS — E03.9 HYPOTHYROIDISM, UNSPECIFIED TYPE: Primary | ICD-10-CM

## 2022-01-12 PROCEDURE — 99443 PR PHYS/QHP TELEPHONE EVALUATION 21-30 MIN: CPT | Performed by: INTERNAL MEDICINE

## 2022-01-12 RX ORDER — ROSUVASTATIN CALCIUM 10 MG/1
10 TABLET, COATED ORAL DAILY
Qty: 90 TABLET | Refills: 2 | Status: SHIPPED | OUTPATIENT
Start: 2022-01-12 | End: 2022-01-17 | Stop reason: SDUPTHER

## 2022-01-12 NOTE — ASSESSMENT & PLAN NOTE
Monitor blood pressure in the afternoons if consistently blood pressure her systolic reading greater than 130 start candesartan 8 mg daily

## 2022-01-12 NOTE — ASSESSMENT & PLAN NOTE
Patient scheduled for upper endoscopy in the coming months  Recheck blood work, CBC and iron panel    Patient tolerating medications well at this time

## 2022-01-12 NOTE — PROGRESS NOTES
Virtual Brief Visit    Patient is located in the following state in which I hold an active license [place]      Assessment/Plan:    Problem List Items Addressed This Visit        Endocrine    Hypothyroid - Primary     Recheck blood work            Cardiovascular and Mediastinum    Hypertension, essential     Monitor blood pressure in the afternoons if consistently blood pressure her systolic reading greater than 130 start candesartan 8 mg daily  Relevant Medications    rosuvastatin (CRESTOR) 10 MG tablet    Other Relevant Orders    CBC and differential    Comprehensive metabolic panel    TSH, 3rd generation with Free T4 reflex       Other    Hypercholesterolemia    Relevant Medications    rosuvastatin (CRESTOR) 10 MG tablet    Vitamin B12 deficiency     Recheck blood work to assess adequacy of supplementation         Relevant Orders    Methylmalonic acid, serum    Iron deficiency anemia due to chronic blood loss     Patient scheduled for upper endoscopy in the coming months  Recheck blood work, CBC and iron panel  Patient tolerating medications well at this time         Relevant Orders    CBC and differential    Iron Panel (Includes Ferritin, Iron Sat%, Iron, and TIBC)          Recent Visits  Date Type Provider Dept   01/10/22 Telephone [facility]   Showing recent visits within past 7 days and meeting all other requirements  Today's Visits  Date Type Provider Dept   01/12/22 Telemedicine [facility]  [facility] today's visits and meeting all other requirements  Future Appointments  No visits were found meeting these conditions  Showing future appointments within next 150 days and meeting all other requirements     Chief Complaint   Patient presents with    Virtual Brief Visit     follow up on chronic medical conditions     [address] being done on 1/19 [facility]    Virtual Brief Visit         HPI 68-year-old lady with past medical history significant for hypertension hyperlipidemia hypothyroidism and iron deficiency anemia due to chronic blood loss with recent admission for sudden onset of syncope and collapse  This was felt to be a vasovagal episode complicated by iron deficiency anemia  She was placed on supplementation and antihypertensives were reduced  She has been doing well since and is in the process of undergoing a GI workup to evaluate the source of blood loss  In the interim she feels well and has no new symptoms she is off of her antihypertensive at the present time      Review of Systems   Gastrointestinal:        GI blood loss   Neurological: Positive for dizziness  All other systems reviewed and are negative  Gen: NAD  Heent: atraumatic, normocephalic  Mouth: is moist  Neck: is supple, no JVD, no carotid bruits     Heart: is regular Normal s1, s2,  Lungs: are clear to auscultation  Abd: soft, non tender, NABS  Ext: no edema, distal pulses normal  Skin: no rashes, ulcers  Mood: normal affect  Neuro: AAOx3      I spent 25 minutes directly with the patient during this visit

## 2022-01-13 ENCOUNTER — APPOINTMENT (OUTPATIENT)
Dept: LAB | Facility: MEDICAL CENTER | Age: 77
End: 2022-01-13
Payer: MEDICARE

## 2022-01-13 DIAGNOSIS — E53.8 VITAMIN B12 DEFICIENCY: ICD-10-CM

## 2022-01-13 DIAGNOSIS — D50.0 IRON DEFICIENCY ANEMIA DUE TO CHRONIC BLOOD LOSS: ICD-10-CM

## 2022-01-13 DIAGNOSIS — I10 HYPERTENSION, ESSENTIAL: ICD-10-CM

## 2022-01-13 LAB
ALBUMIN SERPL BCP-MCNC: 4 G/DL (ref 3.5–5)
ALP SERPL-CCNC: 81 U/L (ref 46–116)
ALT SERPL W P-5'-P-CCNC: 27 U/L (ref 12–78)
ANION GAP SERPL CALCULATED.3IONS-SCNC: 5 MMOL/L (ref 4–13)
AST SERPL W P-5'-P-CCNC: 22 U/L (ref 5–45)
BASOPHILS # BLD AUTO: 0.06 THOUSANDS/ΜL (ref 0–0.1)
BASOPHILS NFR BLD AUTO: 1 % (ref 0–1)
BILIRUB SERPL-MCNC: 0.73 MG/DL (ref 0.2–1)
BUN SERPL-MCNC: 12 MG/DL (ref 5–25)
CALCIUM SERPL-MCNC: 9.4 MG/DL (ref 8.3–10.1)
CHLORIDE SERPL-SCNC: 100 MMOL/L (ref 100–108)
CO2 SERPL-SCNC: 29 MMOL/L (ref 21–32)
CREAT SERPL-MCNC: 0.7 MG/DL (ref 0.6–1.3)
EOSINOPHIL # BLD AUTO: 0.41 THOUSAND/ΜL (ref 0–0.61)
EOSINOPHIL NFR BLD AUTO: 9 % (ref 0–6)
ERYTHROCYTE [DISTWIDTH] IN BLOOD BY AUTOMATED COUNT: 13.5 % (ref 11.6–15.1)
FERRITIN SERPL-MCNC: 46 NG/ML (ref 8–388)
GFR SERPL CREATININE-BSD FRML MDRD: 83 ML/MIN/1.73SQ M
GLUCOSE P FAST SERPL-MCNC: 82 MG/DL (ref 65–99)
HCT VFR BLD AUTO: 43.6 % (ref 34.8–46.1)
HGB BLD-MCNC: 13.6 G/DL (ref 11.5–15.4)
IMM GRANULOCYTES # BLD AUTO: 0.01 THOUSAND/UL (ref 0–0.2)
IMM GRANULOCYTES NFR BLD AUTO: 0 % (ref 0–2)
IRON SATN MFR SERPL: 42 % (ref 15–50)
IRON SERPL-MCNC: 87 UG/DL (ref 50–170)
LYMPHOCYTES # BLD AUTO: 1.22 THOUSANDS/ΜL (ref 0.6–4.47)
LYMPHOCYTES NFR BLD AUTO: 25 % (ref 14–44)
MCH RBC QN AUTO: 31.8 PG (ref 26.8–34.3)
MCHC RBC AUTO-ENTMCNC: 31.2 G/DL (ref 31.4–37.4)
MCV RBC AUTO: 102 FL (ref 82–98)
MONOCYTES # BLD AUTO: 0.61 THOUSAND/ΜL (ref 0.17–1.22)
MONOCYTES NFR BLD AUTO: 13 % (ref 4–12)
NEUTROPHILS # BLD AUTO: 2.52 THOUSANDS/ΜL (ref 1.85–7.62)
NEUTS SEG NFR BLD AUTO: 52 % (ref 43–75)
NRBC BLD AUTO-RTO: 0 /100 WBCS
PLATELET # BLD AUTO: 254 THOUSANDS/UL (ref 149–390)
PMV BLD AUTO: 10.4 FL (ref 8.9–12.7)
POTASSIUM SERPL-SCNC: 4.1 MMOL/L (ref 3.5–5.3)
PROT SERPL-MCNC: 7.5 G/DL (ref 6.4–8.2)
RBC # BLD AUTO: 4.28 MILLION/UL (ref 3.81–5.12)
SODIUM SERPL-SCNC: 134 MMOL/L (ref 136–145)
TIBC SERPL-MCNC: 206 UG/DL (ref 250–450)
TSH SERPL DL<=0.05 MIU/L-ACNC: 3.27 UIU/ML (ref 0.36–3.74)
WBC # BLD AUTO: 4.83 THOUSAND/UL (ref 4.31–10.16)

## 2022-01-13 PROCEDURE — 83540 ASSAY OF IRON: CPT

## 2022-01-13 PROCEDURE — 84443 ASSAY THYROID STIM HORMONE: CPT

## 2022-01-13 PROCEDURE — 36415 COLL VENOUS BLD VENIPUNCTURE: CPT

## 2022-01-13 PROCEDURE — 83918 ORGANIC ACIDS TOTAL QUANT: CPT

## 2022-01-13 PROCEDURE — 85025 COMPLETE CBC W/AUTO DIFF WBC: CPT

## 2022-01-13 PROCEDURE — 80053 COMPREHEN METABOLIC PANEL: CPT

## 2022-01-13 PROCEDURE — 83550 IRON BINDING TEST: CPT

## 2022-01-13 PROCEDURE — 82728 ASSAY OF FERRITIN: CPT

## 2022-01-14 DIAGNOSIS — E01.8 IODINE HYPOTHYROIDISM: ICD-10-CM

## 2022-01-14 RX ORDER — LEVOTHYROXINE SODIUM 0.07 MG/1
75 TABLET ORAL DAILY
Qty: 90 TABLET | Refills: 1 | Status: SHIPPED | OUTPATIENT
Start: 2022-01-14 | End: 2022-04-26 | Stop reason: SDUPTHER

## 2022-01-17 DIAGNOSIS — I10 HYPERTENSION, ESSENTIAL: ICD-10-CM

## 2022-01-17 DIAGNOSIS — E78.00 HYPERCHOLESTEROLEMIA: ICD-10-CM

## 2022-01-17 RX ORDER — ROSUVASTATIN CALCIUM 10 MG/1
10 TABLET, COATED ORAL DAILY
Qty: 90 TABLET | Refills: 1 | Status: SHIPPED | OUTPATIENT
Start: 2022-01-17

## 2022-01-17 NOTE — TELEPHONE ENCOUNTER
Patient did not receive Rosuvastatin from [facility]  She was told the doctor ordered Brand Name Crestor which they do not have  She is almost out and would like RX for Rosuvastatin sent to [facility] in [place]  There is no record of patient needing Brand name Crestor

## 2022-01-19 LAB — METHYLMALONATE SERPL-SCNC: 164 NMOL/L (ref 0–378)

## 2022-02-11 ENCOUNTER — TELEPHONE (OUTPATIENT)
Dept: GASTROENTEROLOGY | Facility: AMBULARY SURGERY CENTER | Age: 77
End: 2022-02-11

## 2022-02-11 NOTE — TELEPHONE ENCOUNTER
Called patient's daughter, Bart Joon discussed the need for endoscopy and evaluation of hiatal hernia  Also will need treatment for iron deficiency and mild B12 deficiency  Medication will need to be stopped 1 week prior to endoscopy     Daughter verbalizes understanding [facility]    Orthopedic Consultation    Kristan Trinidad MRN# [id number]   Age: 64 year old YOB: 1958     Date of Admission:  2/10/2022    Reason for consult: Humerus fracture       Requesting physician: Dr. López       Level of consult: Consult, follow and place orders           Assessment and Plan:   Assessment:   Right proximal humerus fracture, minimal displacement      Plan:   Non-operative treatment  Shoulder immobilizer ordered  Non-WB right UE; nothing heavier than full coffee cup  Ice PRN  Pain medication as needed limit narcotics as able  PT  Social Work consult for dispo  Follow-up with shoulder specialist -[phone]           Chief Complaint:   Right shoulder pain         History of Present Illness:   This patient is a 64 year old female who presents with the following condition requiring a hospital admission:    Per ED note:  The patient slipped on some ice outside, landing on her right arm.  She has mid humerus pain.  She does endorse some numbness and tingling in her right fingers but is able to move them.  She denies hitting her head or other trauma.    Patient was actually leaving to go have her birthday lunch with daughter prior to falling. She is not taking any chronic anticoagulation.           Past Medical History:   No past medical history on file.          Past Surgical History:   No past surgical history on file.          Social History:     Social History     Tobacco Use     Smoking status: Not on file     Smokeless tobacco: Not on file   Substance Use Topics     Alcohol use: Not on file             Family History:   No family history on file.          Immunizations:     VACCINE/DOSE   Diptheria   DPT   DTAP   HBIG   Hepatitis A   Hepatitis B   HIB   Influenza   Measles   Meningococcal   MMR   Mumps   Pneumococcal   Polio   Rubella   Small Pox   TDAP   Varicella   Zoster             Allergies:     Allergies   Allergen Reactions     Codeine Nausea  and Vomiting     Fentanyl Hives             Medications:     Current Facility-Administered Medications   Medication     acetaminophen (TYLENOL) tablet 1,000 mg     atorvastatin (LIPITOR) tablet 40 mg     bisacodyl (DULCOLAX) Suppository 10 mg     DULoxetine (CYMBALTA) DR capsule 60 mg     gabapentin (NEURONTIN) capsule 600 mg     HYDROmorphone (DILAUDID) injection 0.5-1 mg     HYDROmorphone (DILAUDID) tablet 2-4 mg     hydrOXYzine (ATARAX) tablet 25 mg    Or     hydrOXYzine (ATARAX) tablet 50 mg     ibuprofen (ADVIL/MOTRIN) tablet 600 mg     ketorolac (TORADOL) injection 15 mg     lactated ringers infusion     lisinopril (ZESTRIL) tablet 20 mg     magnesium hydroxide (MILK OF MAGNESIA) suspension 15-30 mL     melatonin tablet 5 mg     metoprolol succinate ER (TOPROL-XL) 24 hr tablet 200 mg     naloxone (NARCAN) injection 0.2 mg    Or     naloxone (NARCAN) injection 0.4 mg    Or     naloxone (NARCAN) injection 0.2 mg    Or     naloxone (NARCAN) injection 0.4 mg     omeprazole (priLOSEC) CR capsule 40 mg     ondansetron (ZOFRAN-ODT) ODT tab 4 mg    Or     ondansetron (ZOFRAN) injection 4 mg     oxybutynin (DITROPAN) tablet 5 mg     polyethylene glycol (MIRALAX) Packet 17 g     senna-docusate (SENOKOT-S/PERICOLACE) 8.6-50 MG per tablet 1 tablet    Or     senna-docusate (SENOKOT-S/PERICOLACE) 8.6-50 MG per tablet 2 tablet     tiZANidine (ZANAFLEX) tablet 4-8 mg             Review of Systems:   CV: NEGATIVE for chest pain, palpitations or peripheral edema  C: NEGATIVE for fever, chills, change in weight  E/M: NEGATIVE for ear, mouth and throat problems  R: NEGATIVE for significant cough or SOB          Physical Exam:   All vitals have been reviewed  Patient Vitals for the past 24 hrs:   BP Temp Temp src Pulse Resp SpO2 Height   02/11/22 1551 114/73 97.7  F (36.5  C) Oral 70 20 97 % --   02/11/22 1211 123/75 98.4  F (36.9  C) Oral 70 18 97 % --   02/11/22 0750 126/70 97.9  F (36.6  C) Oral 69 18 93 % --   02/11/22 5999  "(!) 153/73 98.3  F (36.8  C) Oral 72 20 92 % --   02/11/22 0041 -- -- -- -- -- -- 1.626 m (5' 4\")   02/11/22 0009 (!) 155/89 98.2  F (36.8  C) Oral 70 18 97 % --   02/10/22 2320 (!) 143/72 98.7  F (37.1  C) Oral 71 18 97 % --   02/10/22 2200 -- -- -- -- -- 92 % --   02/10/22 2130 133/64 -- -- -- -- 95 % --   02/10/22 1830 -- -- -- -- -- 94 % --   02/10/22 1815 -- -- -- -- -- 95 % --   02/10/22 1800 -- -- -- -- -- 95 % --   02/10/22 1700 119/44 -- -- 70 18 96 % --     No intake or output data in the 24 hours ending 02/11/22 1651  Sling in place  Sensation intact in upper arm over biceps as well as in hand r/m/u nerve distribution  Able to abduct and oppose right thumb  Able to flex and extend right wrist  No ecchymosis or erythema over the right shoulder  Skin intact throughout  +Radial pulse  +cap refill             Data:   All laboratory data reviewed  Results for orders placed or performed during the hospital encounter of 02/10/22   Humerus XR, G/E 2 views, right     Status: None    Narrative    RIGHT HUMERUS TWO OR MORE VIEWS  2/10/2022 12:41 PM    INDICATION: Fall, midhumerus pain.    COMPARISON: None available.      Impression    IMPRESSION: Mildly displaced right proximal humerus fracture centered  at the humeral neck. Mid to distal right humerus appears intact. No  joint malalignment. Lateral downsloping of the right acromion with  mild right acromioclavicular joint osteoarthritis. No sizable elbow  joint effusion.     BENJI CHE MD         SYSTEM ID:  [id number]   XR Shoulder Right 1 View     Status: None    Narrative    SHOULDER RIGHT ONE VIEW  DATE/TIME: 2/11/2022 10:00 AM     INDICATION: Right-sided shoulder pain. Concern for dislocation.   COMPARISON: 2/10/2022.      Impression    IMPRESSION:  1.  Normal right glenohumeral alignment on this single axillary view.  2.  Proximal right humerus fracture.  3.  Cardiac pacer leads.    ELEANOR RODRIGUEZ MD         SYSTEM ID:  [id number]   CT Shoulder Right w/o " Contrast     Status: None    Narrative    CT SHOULDER RIGHT WITHOUT CONTRAST 2/11/2022 11:51 AM    INDICATION: Right proximal humerus fracture.  COMPARISON: Right humerus radiographic exam 2/10/2022. Right shoulder  radiographic exam 2/11/2022  TECHNIQUE: Noncontrast. Axial, sagittal and coronal thin-section  reconstruction. Dose reduction techniques were used.   CONTRAST: None.    FINDINGS:   Redemonstrated is a mildly displaced right proximal humerus fracture  centered at the humeral neck. Fracture line extension into the greater  tuberosity. No significant elevation or medial rotation of the greater  tuberosity. No glenohumeral joint dislocation. Mild right  acromioclavicular joint osteoarthritis. Diffuse bone demineralization.  No concerning bone lesion. No clavicle or scapula fracture identified.  No displaced right-sided rib fracture is seen. Leads of cardiac  pacemaker/defibrillator noted. Right shoulder soft tissue swelling  about the fracture. No sizable joint effusion. No axillary adenopathy.    Type I acromion. Intact-appearing coracoacromial and coracoclavicular  ligaments. Largely preserved rotator cuff muscle bulk. Deltoid muscle  bulk is normal. Degenerative change lower cervical spine. Visualized  right lung remarkable for dependent atelectasis. Mosaic attenuation in  the right lung.      Impression    IMPRESSION:  1.  Mildly displaced comminuted right proximal humerus fracture.  2.  No glenohumeral joint dislocation.      BENJI CHE MD         SYSTEM ID:  [id number]   Basic metabolic panel     Status: Abnormal   Result Value Ref Range    Sodium 137 133 - 144 mmol/L    Potassium 4.3 3.4 - 5.3 mmol/L    Chloride 110 (H) 94 - 109 mmol/L    Carbon Dioxide (CO2) 22 20 - 32 mmol/L    Anion Gap 5 3 - 14 mmol/L    Urea Nitrogen 21 7 - 30 mg/dL    Creatinine 0.66 0.52 - 1.04 mg/dL    Calcium 8.6 8.5 - 10.1 mg/dL    Glucose 114 (H) 70 - 99 mg/dL    GFR Estimate >90 >60 mL/min/1.73m2   CBC with platelets  and differential     Status: Abnormal   Result Value Ref Range    WBC Count 11.5 (H) 4.0 - 11.0 10e3/uL    RBC Count 4.89 3.80 - 5.20 10e6/uL    Hemoglobin 13.6 11.7 - 15.7 g/dL    Hematocrit 42.7 35.0 - 47.0 %    MCV 87 78 - 100 fL    MCH 27.8 26.5 - 33.0 pg    MCHC 31.9 31.5 - 36.5 g/dL    RDW 17.2 (H) 10.0 - 15.0 %    Platelet Count 242 150 - 450 10e3/uL    % Neutrophils 73 %    % Lymphocytes 20 %    % Monocytes 7 %    % Eosinophils 0 %    % Basophils 0 %    % Immature Granulocytes 0 %    NRBCs per 100 WBC 0 <1 /100    Absolute Neutrophils 8.3 1.6 - 8.3 10e3/uL    Absolute Lymphocytes 2.3 0.8 - 5.3 10e3/uL    Absolute Monocytes 0.8 0.0 - 1.3 10e3/uL    Absolute Eosinophils 0.0 0.0 - 0.7 10e3/uL    Absolute Basophils 0.0 0.0 - 0.2 10e3/uL    Absolute Immature Granulocytes 0.1 <=0.4 10e3/uL    Absolute NRBCs 0.0 10e3/uL   Asymptomatic COVID-19 Virus (Coronavirus) by PCR Nasopharyngeal     Status: Normal    Specimen: Nasopharyngeal; Swab   Result Value Ref Range    SARS CoV2 PCR Negative Negative    Narrative    Testing was performed using the [employer]  SARS-CoV-2 & Influenza A/B Assay on the [employer]  [employer]  System.  This test should be ordered for the detection of SARS-COV-2 in individuals who meet SARS-CoV-2 clinical and/or epidemiological criteria. Test performance is unknown in asymptomatic patients.  This test is for in vitro diagnostic use under the FDA EUA for laboratories certified under CLIA to perform moderate and/or high complexity testing. This test has not been FDA cleared or approved.  A negative test does not rule out the presence of PCR inhibitors in the specimen or target RNA in concentration below the limit of detection for the assay. The possibility of a false negative should be considered if the patient's recent exposure or clinical presentation suggests COVID-19.  [facility] [employer] are certified under the Clinical Laboratory Improvement Amendments of 1988 (CLIA-88) as qualified to perform  moderate and/or high complexity laboratory testing.   Basic metabolic panel     Status: Abnormal   Result Value Ref Range    Sodium 139 133 - 144 mmol/L    Potassium 4.1 3.4 - 5.3 mmol/L    Chloride 111 (H) 94 - 109 mmol/L    Carbon Dioxide (CO2) 24 20 - 32 mmol/L    Anion Gap 4 3 - 14 mmol/L    Urea Nitrogen 14 7 - 30 mg/dL    Creatinine 0.65 0.52 - 1.04 mg/dL    Calcium 8.7 8.5 - 10.1 mg/dL    Glucose 115 (H) 70 - 99 mg/dL    GFR Estimate >90 >60 mL/min/1.73m2   CBC with platelets     Status: Abnormal   Result Value Ref Range    WBC Count 11.2 (H) 4.0 - 11.0 10e3/uL    RBC Count 4.97 3.80 - 5.20 10e6/uL    Hemoglobin 13.8 11.7 - 15.7 g/dL    Hematocrit 44.1 35.0 - 47.0 %    MCV 89 78 - 100 fL    MCH 27.8 26.5 - 33.0 pg    MCHC 31.3 (L) 31.5 - 36.5 g/dL    RDW 17.2 (H) 10.0 - 15.0 %    Platelet Count 230 150 - 450 10e3/uL   Hepatic panel     Status: Normal   Result Value Ref Range    Bilirubin Total 0.4 0.2 - 1.3 mg/dL    Bilirubin Direct <0.1 0.0 - 0.2 mg/dL    Protein Total 7.1 6.8 - 8.8 g/dL    Albumin 3.5 3.4 - 5.0 g/dL    Alkaline Phosphatase 61 40 - 150 U/L    AST 16 0 - 45 U/L    ALT 24 0 - 50 U/L   EKG 12-lead, tracing only     Status: None   Result Value Ref Range    Systolic Blood Pressure  mmHg    Diastolic Blood Pressure  mmHg    Ventricular Rate 66 BPM    Atrial Rate 66 BPM    MN Interval 106 ms    QRS Duration 162 ms     ms    QTc 568 ms    P Axis 101 degrees    R AXIS -83 degrees    T Axis 89 degrees    Interpretation ECG       AV dual-paced rhythm  Abnormal ECG  No previous ECGs available  Confirmed by - EMERGENCY ROOM, PHYSICIAN ([id number]),  TRACY GUO (Bruno) on 2/11/2022 6:38:51 AM     CBC with platelets differential     Status: Abnormal    Narrative    The following orders were created for panel order CBC with platelets differential.  Procedure                               Abnormality         Status                     ---------                               -----------          ------                     CBC with platelets and d...[[id number]]  Abnormal            Final result                 Please view results for these tests on the individual orders.          Attestation:  I have reviewed today's vital signs, notes, medications, labs and imaging with Dr. Ortiz.  Amount of time performed on this consult: 30 minutes.    Jie Griffin PA-C

## 2022-02-14 ENCOUNTER — ANESTHESIA EVENT (OUTPATIENT)
Dept: GASTROENTEROLOGY | Facility: AMBULARY SURGERY CENTER | Age: 77
End: 2022-02-14

## 2022-02-14 ENCOUNTER — HOSPITAL ENCOUNTER (OUTPATIENT)
Dept: GASTROENTEROLOGY | Facility: AMBULARY SURGERY CENTER | Age: 77
Setting detail: OUTPATIENT SURGERY
Discharge: HOME | End: 2022-02-14
Attending: INTERNAL MEDICINE
Payer: MEDICARE

## 2022-02-14 ENCOUNTER — ANESTHESIA (OUTPATIENT)
Dept: GASTROENTEROLOGY | Facility: AMBULARY SURGERY CENTER | Age: 77
End: 2022-02-14

## 2022-02-14 VITALS
RESPIRATION RATE: 22 BRPM | BODY MASS INDEX: 23.16 KG/M2 | HEART RATE: 59 BPM | HEIGHT: 65 IN | DIASTOLIC BLOOD PRESSURE: 61 MMHG | SYSTOLIC BLOOD PRESSURE: 132 MMHG | OXYGEN SATURATION: 98 % | WEIGHT: 139 LBS | TEMPERATURE: 97.2 F

## 2022-02-14 DIAGNOSIS — D50.0 IRON DEFICIENCY ANEMIA DUE TO CHRONIC BLOOD LOSS: Primary | ICD-10-CM

## 2022-02-14 DIAGNOSIS — K44.9 HIATAL HERNIA: ICD-10-CM

## 2022-02-14 PROCEDURE — 43239 EGD BIOPSY SINGLE/MULTIPLE: CPT | Performed by: INTERNAL MEDICINE

## 2022-02-14 PROCEDURE — 43249 ESOPH EGD DILATION <30 MM: CPT | Performed by: INTERNAL MEDICINE

## 2022-02-14 PROCEDURE — 88305 TISSUE EXAM BY PATHOLOGIST: CPT | Performed by: PATHOLOGY

## 2022-02-14 PROCEDURE — C1726 CATH, BAL DIL, NON-VASCULAR: HCPCS

## 2022-02-14 RX ORDER — SODIUM CHLORIDE, SODIUM LACTATE, POTASSIUM CHLORIDE, CALCIUM CHLORIDE 600; 310; 30; 20 MG/100ML; MG/100ML; MG/100ML; MG/100ML
INJECTION, SOLUTION INTRAVENOUS CONTINUOUS PRN
Status: DISCONTINUED | OUTPATIENT
Start: 2022-02-14 | End: 2022-02-14

## 2022-02-14 RX ORDER — PROPOFOL 10 MG/ML
INJECTION, EMULSION INTRAVENOUS AS NEEDED
Status: DISCONTINUED | OUTPATIENT
Start: 2022-02-14 | End: 2022-02-14

## 2022-02-14 RX ADMIN — PROPOFOL 30 MG: 10 INJECTION, EMULSION INTRAVENOUS at 10:25

## 2022-02-14 RX ADMIN — PROPOFOL 30 MG: 10 INJECTION, EMULSION INTRAVENOUS at 10:35

## 2022-02-14 RX ADMIN — PROPOFOL 30 MG: 10 INJECTION, EMULSION INTRAVENOUS at 10:24

## 2022-02-14 RX ADMIN — PROPOFOL 20 MG: 10 INJECTION, EMULSION INTRAVENOUS at 10:30

## 2022-02-14 RX ADMIN — SODIUM CHLORIDE, SODIUM LACTATE, POTASSIUM CHLORIDE, AND CALCIUM CHLORIDE: .6; .31; .03; .02 INJECTION, SOLUTION INTRAVENOUS at 10:20

## 2022-02-14 RX ADMIN — PROPOFOL 30 MG: 10 INJECTION, EMULSION INTRAVENOUS at 10:32

## 2022-02-14 RX ADMIN — PROPOFOL 50 MG: 10 INJECTION, EMULSION INTRAVENOUS at 10:23

## 2022-02-14 RX ADMIN — PROPOFOL 30 MG: 10 INJECTION, EMULSION INTRAVENOUS at 10:29

## 2022-02-14 NOTE — H&P
History and Physical - [facility]  Mikie Sosa 64 y o  female MRN: [id number]    HPI: Mikie Sosa is a 64y o  year old female who presents with iron def anemai  Review of Systems    Historical Information   Past Medical History:   Diagnosis Date    Disease of thyroid gland     Hypertension     Osteopenia     last assessed 13    PONV (postoperative nausea and vomiting)     Vaginal atrophy      Past Surgical History:   Procedure Laterality Date    COLONOSCOPY      DILATION AND CURETTAGE OF UTERUS      TUBAL LIGATION       Social History   Social History     Substance and Sexual Activity   Alcohol Use Not Currently    Comment: rare occasion     Social History     Substance and Sexual Activity   Drug Use No     Social History     Tobacco Use   Smoking Status Former Smoker    Packs/day: 0 25    Years: 8 00    Pack years: 2 00    Types: Cigarettes    Quit date: 5    Years since quittin 1   Smokeless Tobacco Never Used   Tobacco Comment    about 39 years ago      Family History   Problem Relation Age of Onset    Lymphoma Mother 79        acute    Hyperlipidemia Father     Peripheral vascular disease Father     Breast cancer Sister     Leukemia Brother     Other Other         4 children living       Meds/Allergies     (Not in a hospital admission)      Allergies   Allergen Reactions    Other      Seasonal allergies        Objective     /67   Pulse 67   Temp (!) 97 2 °F (36 2 °C) (Temporal)   Resp 16   Ht 5' 4 5" (1 638 m)   Wt 63 kg (139 lb)   SpO2 100%   BMI 23 49 kg/m²       PHYSICAL EXAM    Gen: NAD  CV: RRR  CHEST: Clear  ABD: soft, NT/ND  EXT: no edema  Neuro: AAO      ASSESSMENT/PLAN:  This is a 64y o  year old female here for  iron def anemai         PLAN:   Procedure: [place]

## 2022-02-14 NOTE — ANESTHESIA PREPROCEDURE EVALUATION
Procedure:  EGD    Relevant Problems   CARDIO   (+) Hypercholesterolemia   (+) Hypertension, essential      ENDO   (+) Hypothyroid      GI/HEPATIC   (+) Dysphagia      /RENAL   (+) LYLE (acute kidney injury) ([facility] [address] )      HEMATOLOGY   (+) Iron deficiency anemia due to chronic blood loss        Physical Exam    Airway    Mallampati score: I  TM Distance: >3 FB  Neck ROM: full     Dental       Cardiovascular      Pulmonary      Other Findings        Anesthesia Plan  ASA Score- 2     Anesthesia Type- IV sedation with anesthesia with ASA Monitors  Additional Monitors:   Airway Plan:           Plan Factors-Exercise tolerance (METS): >4 METS  Chart reviewed  EKG reviewed  Imaging results reviewed  Existing labs reviewed  Patient summary reviewed  Induction- intravenous  Postoperative Plan-     Informed Consent- Anesthetic plan and risks discussed with patient  I personally reviewed this patient with the CRNA  Discussed and agreed on the Anesthesia Plan with the CRNA             NPO appropriate  Discussed benefits/risks of monitored anesthetic care and discussed providing a dynamic level of mild to deep sedation  Risks include awareness, airway obstruction, aspiration which may necessitate conversion to general anesthesia  All questions answered  Patient understands and wishes to proceed  Anesthesia plan and consent discussed with Aye George who expressed understanding and agreement  Risks/benefits and alternatives discussed with patient including possible PONV, sore throat, damage to teeth/lips/gums and possibility of rare anesthetic and surgical emergencies

## 2022-02-14 NOTE — DISCHARGE INSTRUCTIONS
Upper Endoscopy   WHAT YOU NEED TO KNOW:   An upper endoscopy is also called an upper gastrointestinal (GI) endoscopy, or an esophagogastroduodenoscopy (EGD)  It is a procedure to examine the inside of your esophagus, stomach, and duodenum (first part of the small intestine) with a scope  You may feel bloated, gassy, or have some abdominal discomfort after your procedure  Your throat may be sore for 24 to 36 hours  You may burp or pass gas from air that is still inside your body  DISCHARGE INSTRUCTIONS:   Seek care immediately if:    You have sudden, severe abdominal pain   You have problems swallowing   You have a large amount of black, sticky bowel movements or blood in your bowel movements   You have sudden trouble breathing   You feel weak, lightheaded, or faint or your heart beats faster than normal for you  Contact your healthcare provider if:    You have a fever and chills   You have nausea or are vomiting   Your abdomen is bloated or feels full and hard   You have abdominal pain   You have black, sticky bowel movements or blood in your bowel movements   You have not had a bowel movement for 3 days after your procedure   You have rash or hives   You have questions or concerns about your procedure  Activity:    Do not lift, strain, or run for 24 hours after your procedure   Rest after your procedure  You have been given medicine to relax you  Do not drive or make important decisions until the day after your procedure  Return to your normal activity as directed   Relieve gas and discomfort from bloating by lying on your right side with a heating pad on your abdomen  You may need to take short walks to help the gas move out  Eat small meals until bloating is relieved  Follow up with your healthcare provider as directed: Write down your questions so you remember to ask them during your visits       If you take a blood thinner, please review the specific instructions from your endoscopist about when you should resume it  These can be found in the Recommendation and Your Medication list sections of this After Visit Summary  Gastritis   WHAT YOU NEED TO KNOW:   What is gastritis? Gastritis is inflammation or irritation of the lining of your stomach  What increases my risk for gastritis? · Infection with bacteria, a virus, or a parasite    · NSAIDs, aspirin, or steroid medicine    · Use of tobacco products or alcohol    · Trauma such as an injury to your stomach or intestine    · Autoimmune disorders such as diabetes, thyroid disease, or Crohn disease    · Stress    · Age older than 60 years    · Illegal drugs, such as cocaine    What are the signs and symptoms of gastritis? · Stomach pain, burning, or tenderness when you press on it    · Stomach fullness or tightness    · Nausea or vomiting    · Loss of appetite, or feeling full quickly when you eat    · Bad breath    · Fatigue or feeling more tired than usual    · Heartburn    How is gastritis diagnosed? Your healthcare provider will ask about your signs and symptoms and examine you  You may need any of the following:  · Blood tests  may be used to show an infection, dehydration, or anemia (low red blood cell levels)  · A bowel movement sample  may be tested for blood or the germ that may be causing your gastritis  · A breath test  may show if H pylori is causing your gastritis  You will be given a liquid to drink  Then you will breathe into a bag  Your healthcare provider will measure the amount of carbon dioxide in your breath  Extra amounts of carbon dioxide may mean you have an H pylori infection  · An endoscopy  may be used to look for irritation or bleeding in your stomach  Your healthcare provider will use an endoscope (tube with a light and camera on the end) during the procedure  He or she may take a sample from your stomach to be tested      How is gastritis treated? Your symptoms may go away without treatment  Treatment will depend on what is causing your gastritis  Your healthcare provider may recommend changes to the medicines you take  Medicines may be given to help treat a bacterial infection or decrease stomach acid  How can I manage or prevent gastritis? · Do not smoke  Nicotine and other chemicals in cigarettes and cigars can make your symptoms worse and cause lung damage  Ask your healthcare provider for information if you currently smoke and need help to quit  E-cigarettes or smokeless tobacco still contain nicotine  Talk to your healthcare provider before you use these products  · Do not drink alcohol  Alcohol can prevent healing and make your gastritis worse  Talk to your healthcare provider if you need help to stop drinking  · Do not take NSAIDs or aspirin unless directed  These and similar medicines can cause irritation of your stomach lining  If your healthcare provider says it is okay to take NSAIDs, take them with food  · Do not eat foods that cause irritation  Foods such as oranges and salsa can cause burning or pain  Eat a variety of healthy foods  Examples include fruits (not citrus), vegetables, low-fat dairy products, beans, whole-grain breads, and lean meats and fish  Try to eat small meals, and drink water with your meals  Do not eat for at least 3 hours before you go to bed  · Find ways to relax and decrease stress  Stress can increase stomach acid and make gastritis worse  Activities such as yoga, meditation, or listening to music can help you relax  Spend time with friends, or do things you enjoy  Call 911 for any of the following:   · You develop chest pain or shortness of breath  When should I seek immediate care? · You vomit blood  · You have black or bloody bowel movements  · You have severe stomach or back pain  When should I contact my healthcare provider?    · You have a fever     · You have new or worsening symptoms, even after treatment  · You have questions or concerns about your condition or care  CARE AGREEMENT:   You have the right to help plan your care  Learn about your health condition and how it may be treated  Discuss treatment options with your healthcare providers to decide what care you want to receive  You always have the right to refuse treatment  The above information is an  only  It is not intended as medical advice for individual conditions or treatments  Talk to your doctor, nurse or pharmacist before following any medical regimen to see if it is safe and effective for you  © Copyright [employer] 2021 Information is for End User's use only and may not be sold, redistributed or otherwise used for commercial purposes  All illustrations and images included in CareNotes® are the copyrighted property of [employer]  or [facility]       Hiatal Hernia   WHAT YOU NEED TO KNOW:   What is a hiatal hernia? A hiatal hernia is a condition that causes part of your stomach to bulge through the hiatus (small opening) in your diaphragm  The part of the stomach may move up and down, or it may get trapped above the diaphragm  What increases my risk for a hiatal hernia? The exact cause of a hiatal hernia is not known  You may have been born with a large hiatus  The following may increase your risk of a hiatal hernia:  · Obesity    · Older age    · Medical conditions such as diverticulosis or esophagitis    · Previous surgery of the esophagus or stomach or trauma such as from a motor vehicle accident    What are the types of hiatal hernia? · Type I (sliding hiatal hernia): A portion of the stomach slides in and out of the hiatus  This type is the most common and usually causes gastroesophageal reflux disease (GERD)  GERD occurs when the esophageal sphincter does not close properly and causes acid reflux   The esophageal sphincter is the lower muscle of the esophagus  · Type II (paraesophageal hiatal hernia):  Type II hiatal hernia forms when a part of the stomach squeezes through the hiatus and lies next to the esophagus  · Type III (combined):  Type III hiatal hernia is a combination of a sliding and a paraesophageal hiatal hernia  · Type IV (complex paraesophageal hiatal hernia): The whole stomach, the small and large bowels, spleen, pancreas, or liver is pushed up into the chest     What are the signs and symptoms of a hiatal hernia? The most common symptom is heartburn  This usually occurs after meals and spreads to your neck, jaw, or shoulder  You may have no signs or symptoms, or you may have any of the following:  · Abdominal pain, especially in the area just above your navel    · Bitter or acid taste in your mouth    · Trouble swallowing    · Coughing or hoarseness    · Chest pain or shortness of breath that occurs after eating    · Frequent burping or hiccups    · Uncomfortable feeling of fullness after eating    How is a hiatal hernia diagnosed? · An upper GI series test  includes x-rays of your esophagus, stomach, and your small intestines  It is also called a barium swallow test  You will be given barium (a chalky liquid) to drink before the pictures are taken  This liquid helps your stomach and intestines show up better on the x-rays  An upper GI series can show if you have an ulcer, a blocked intestine, or other problems  · An endoscopy  uses a scope to see the inside of your digestive tract  A scope is a long, bendable tube with a light on the end of it  A camera may be hooked to the scope to take pictures  How is a hiatal hernia treated? Treatment depends on the type of hiatal hernia you have and on your symptoms  You may not need any treatment  You may need any of the following:  · Medicines  may be given to relieve heartburn symptoms  These medicines help to decrease or block stomach acid   You may also be given medicines that help to tighten the esophageal sphincter  · Surgery  may be done when medicines cannot control your symptoms, or other problems are present  Your healthcare provider may also suggest surgery depending on the type of hernia you have  Your healthcare provider can put your stomach back into its normal location  He or she may make the hiatus (hole) smaller and anchor your stomach in your abdomen  Fundoplication is a surgery that wraps the upper part of the stomach around the esophageal sphincter to strengthen it  How can I manage my symptoms? The following nutrition and lifestyle changes may be recommended to relieve symptoms of heartburn:     · Avoid foods that make your symptoms worse  These may include spicy foods, fruit juices, alcohol, caffeine, chocolate, and mint  · Eat several small meals during the day  Small meals give your stomach less food to digest     · Avoid lying down and bending forward after you eat  Do not eat meals 2 to 3 hours before bedtime  This decreases your risk for reflux  · Maintain a healthy weight  If you are overweight, weight loss may help relieve your symptoms  · Sleep with your head elevated  at least 6 inches  · Do not smoke  Smoking can increase your symptoms of heartburn  Call your local emergency number (911 in the [address]) if:   · You have severe chest pain and sudden trouble breathing  When should I seek immediate care? · You have severe abdominal pain  · You try to vomit but nothing comes out (retching)  · Your bowel movements are black or bloody  · Your vomit looks like coffee grounds or has blood in it  When should I call my doctor? · Your symptoms are getting worse  · You have nausea, and you are vomiting  · You are losing weight without trying  · You have questions or concerns about your condition or care  CARE AGREEMENT:   You have the right to help plan your care   Learn about your health condition and how it may be treated  Discuss treatment options with your healthcare providers to decide what care you want to receive  You always have the right to refuse treatment  The above information is an  only  It is not intended as medical advice for individual conditions or treatments  Talk to your doctor, nurse or pharmacist before following any medical regimen to see if it is safe and effective for you  © Copyright [employer] 2021 Information is for End User's use only and may not be sold, redistributed or otherwise used for commercial purposes   All illustrations and images included in CareNotes® are the copyrighted property of [employer] , Inc  or [address] [place] [address] [employer]

## 2022-02-14 NOTE — ANESTHESIA POSTPROCEDURE EVALUATION
Post-Op Assessment Note    CV Status:  Stable  Pain Score: 0    Pain management: adequate     Mental Status:  Arousable and sleepy   Hydration Status:  Euvolemic and stable   PONV Controlled:  Controlled   Airway Patency:  Patent and adequate      Post Op Vitals Reviewed: Yes      Staff: CRNA         No complications documented      BP 95/50 (02/14/22 1039)    Temp (!) 97 2 °F (36 2 °C) (02/14/22 1039)    Pulse 62 (02/14/22 1039)   Resp 15 (02/14/22 1039)    SpO2 98 % (02/14/22 1039)

## 2022-03-02 ENCOUNTER — TELEPHONE (OUTPATIENT)
Dept: GASTROENTEROLOGY | Facility: CLINIC | Age: 77
End: 2022-03-02

## 2022-03-02 NOTE — TELEPHONE ENCOUNTER
Called actual pt at home number, pt aware of all results and verbalized understanding of results and to continue pantoprazole  Advised of issue with communication consent and that I sent a new form

## 2022-03-02 NOTE — TELEPHONE ENCOUNTER
Mobile phone listed as primary is actually daughter's phone number  Communication consent is specifically EXCLUDING daughter from being spoken to  Sending new communication consent form to pt to correct if necessary  Pt's daughter states that she has already read results and recommendations by Dr Hilaria Woods, as she has her mother's consent to log into her My Chart

## 2022-03-28 DIAGNOSIS — D64.9 ANEMIA, UNSPECIFIED TYPE: ICD-10-CM

## 2022-03-28 RX ORDER — PANTOPRAZOLE SODIUM 40 MG/1
TABLET, DELAYED RELEASE ORAL
Qty: 30 TABLET | Refills: 3 | Status: SHIPPED | OUTPATIENT
Start: 2022-03-28 | End: 2022-03-31

## 2022-03-31 DIAGNOSIS — D64.9 ANEMIA, UNSPECIFIED TYPE: ICD-10-CM

## 2022-03-31 RX ORDER — PANTOPRAZOLE SODIUM 40 MG/1
TABLET, DELAYED RELEASE ORAL
Qty: 30 TABLET | Refills: 3 | Status: SHIPPED | OUTPATIENT
Start: 2022-03-31 | End: 2022-04-26 | Stop reason: SDUPTHER

## 2022-04-21 ENCOUNTER — TELEPHONE (OUTPATIENT)
Dept: INTERNAL MEDICINE CLINIC | Facility: CLINIC | Age: 77
End: 2022-04-21

## 2022-04-21 NOTE — TELEPHONE ENCOUNTER
Will Patient need lab work by Dr Tessa Stewart before her appt on 04/26/2022      She already has some lab work by Dr Christel Peña

## 2022-04-26 ENCOUNTER — OFFICE VISIT (OUTPATIENT)
Dept: INTERNAL MEDICINE CLINIC | Facility: CLINIC | Age: 77
End: 2022-04-26
Payer: MEDICARE

## 2022-04-26 VITALS
HEIGHT: 65 IN | TEMPERATURE: 97.9 F | WEIGHT: 139 LBS | OXYGEN SATURATION: 97 % | BODY MASS INDEX: 23.16 KG/M2 | DIASTOLIC BLOOD PRESSURE: 84 MMHG | SYSTOLIC BLOOD PRESSURE: 140 MMHG | HEART RATE: 77 BPM

## 2022-04-26 DIAGNOSIS — R21 RASH: Primary | ICD-10-CM

## 2022-04-26 DIAGNOSIS — D50.0 IRON DEFICIENCY ANEMIA DUE TO CHRONIC BLOOD LOSS: ICD-10-CM

## 2022-04-26 DIAGNOSIS — E01.8 IODINE HYPOTHYROIDISM: ICD-10-CM

## 2022-04-26 DIAGNOSIS — E53.8 VITAMIN B12 DEFICIENCY: ICD-10-CM

## 2022-04-26 DIAGNOSIS — D64.9 ANEMIA, UNSPECIFIED TYPE: ICD-10-CM

## 2022-04-26 DIAGNOSIS — E55.9 VITAMIN D DEFICIENCY: ICD-10-CM

## 2022-04-26 DIAGNOSIS — E78.00 HYPERCHOLESTEROLEMIA: ICD-10-CM

## 2022-04-26 DIAGNOSIS — L71.9 ROSACEA: ICD-10-CM

## 2022-04-26 DIAGNOSIS — I10 HYPERTENSION, ESSENTIAL: ICD-10-CM

## 2022-04-26 DIAGNOSIS — E03.9 HYPOTHYROIDISM, UNSPECIFIED TYPE: ICD-10-CM

## 2022-04-26 PROBLEM — K21.00 GASTROESOPHAGEAL REFLUX DISEASE WITH ESOPHAGITIS WITHOUT HEMORRHAGE: Status: ACTIVE | Noted: 2022-04-26

## 2022-04-26 PROCEDURE — 99214 OFFICE O/P EST MOD 30 MIN: CPT | Performed by: INTERNAL MEDICINE

## 2022-04-26 RX ORDER — CLINDAMYCIN PHOSPHATE 10 UG/ML
LOTION TOPICAL 2 TIMES DAILY
Qty: 60 ML | Refills: 0 | Status: SHIPPED | OUTPATIENT
Start: 2022-04-26

## 2022-04-26 RX ORDER — LEVOTHYROXINE SODIUM 0.07 MG/1
75 TABLET ORAL DAILY
Qty: 90 TABLET | Refills: 1 | Status: SHIPPED | OUTPATIENT
Start: 2022-04-26

## 2022-04-26 RX ORDER — PANTOPRAZOLE SODIUM 40 MG/1
40 TABLET, DELAYED RELEASE ORAL DAILY
Qty: 90 TABLET | Refills: 1 | Status: SHIPPED | OUTPATIENT
Start: 2022-04-26 | End: 2022-05-20 | Stop reason: SDUPTHER

## 2022-04-26 RX ORDER — LEVOTHYROXINE SODIUM 0.07 MG/1
75 TABLET ORAL DAILY
Qty: 90 TABLET | Refills: 1 | Status: SHIPPED | OUTPATIENT
Start: 2022-04-26 | End: 2022-04-26 | Stop reason: SDUPTHER

## 2022-04-26 RX ORDER — PANTOPRAZOLE SODIUM 40 MG/1
40 TABLET, DELAYED RELEASE ORAL DAILY
Qty: 90 TABLET | Refills: 1 | Status: SHIPPED | OUTPATIENT
Start: 2022-04-26 | End: 2022-04-26 | Stop reason: SDUPTHER

## 2022-04-26 NOTE — ASSESSMENT & PLAN NOTE
Continued follow up with GI  Iron rx on hold at the present time    Recheck iron panel prior to follow-up with GI

## 2022-04-26 NOTE — ASSESSMENT & PLAN NOTE
There seems to be a current flare-up of her rosacea with some comedones along her cheeks especially on the right which are fairly tender  Significant vasodilation seen on her cheeks forehead and her chin  Recommend hold Crestor for now for 1-2 weeks to see if that is the culprit  Continue to monitor blood pressure  Will also give her a topical antibiotic to try in the areas that are especially tender  Additionally use of Vaseline and topical cool compress is recommended

## 2022-04-26 NOTE — PROGRESS NOTES
Assessment/Plan:    Problem List Items Addressed This Visit        Endocrine    Hypothyroid     Recheck thyroid function to see if thyroid levels are within normal limits         Relevant Medications    levothyroxine (Synthroid) 75 mcg tablet    Other Relevant Orders    TSH, 3rd generation with Free T4 reflex       Cardiovascular and Mediastinum    Hypertension, essential    Relevant Orders    Comprehensive metabolic panel       Musculoskeletal and Integument    Rosacea     There seems to be a current flare-up of her rosacea with some comedones along her cheeks especially on the right which are fairly tender  Significant vasodilation seen on her cheeks forehead and her chin  Recommend hold Crestor for now for 1-2 weeks to see if that is the culprit  Continue to monitor blood pressure  Will also give her a topical antibiotic to try in the areas that are especially tender  Additionally use of Vaseline and topical cool compress is recommended  Relevant Medications    clindamycin (CLEOCIN T) 1 % lotion       Other    Hypercholesterolemia    Relevant Orders    Lipid Panel with Direct LDL reflex    Vitamin B12 deficiency    Vitamin D deficiency    Relevant Orders    Vitamin D 25 hydroxy    Iron deficiency anemia due to chronic blood loss     Continued follow up with GI  Iron rx on hold at the present time    Recheck iron panel prior to follow-up with GI           Other Visit Diagnoses     Rash    -  Primary    Relevant Medications    clindamycin (CLEOCIN T) 1 % lotion    Iodine hypothyroidism        Relevant Medications    levothyroxine (Synthroid) 75 mcg tablet    Anemia, unspecified type        Relevant Medications    pantoprazole (PROTONIX) 40 mg tablet                        Subjective:   Chief Complaint   Patient presents with    Follow-up     3 month f/u   [address] schedule after 6/30 fall risk dexa ordered in June pt will call to scheduled in future        Patient ID: Luberta Stephen is a 76 y o  female  HPI     Past medical history significant for hypertension, hyperlipidemia, hypothyroidism and iron deficiency anemia due to chronic blood loss with a recent history of sudden onset of syncope and collapse felt to be secondary to vasovagal episode complicated by iron deficiency anemia  Since then she has undergone a colonoscopy and an EGD which showed a medium sliding hiatal hernia and mildly erythematous mucosa in the antrum  She has been on better present of 40 mg daily  She feels well and has no GI symptoms  However, her complaint today is a rash on her face which is an aggravation of her chronic rosacea  She is presently NOT on her antihypertensive medication  She has been monitoring her blood pressure at home of the highest systolic has been recorded is 140 mm Hg  The following portions of the patient's history were reviewed and updated as appropriate: past family history, past medical history, past social history, past surgical history and problem list     Review of Systems   HENT: Positive for postnasal drip  Skin: Positive for rash  All other systems reviewed and are negative          Past Medical History:   Diagnosis Date    Disease of thyroid gland     Hypertension     Osteopenia     last assessed 12/17/13    PONV (postoperative nausea and vomiting)     Vaginal atrophy          Current Outpatient Medications:     Cholecalciferol (VITAMIN D3) 2000 units CHEW, Chew 2 each daily , Disp: , Rfl:     COD LIVER OIL PO, Take by mouth 1 tsp daily, Disp: , Rfl:     levothyroxine (Synthroid) 75 mcg tablet, Take 1 tablet (75 mcg total) by mouth daily, Disp: 90 tablet, Rfl: 1    mometasone (ELOCON) 0 1 % cream, Apply topically daily as needed (allergies), Disp: 45 g, Rfl: 0    pantoprazole (PROTONIX) 40 mg tablet, Take 1 tablet (40 mg total) by mouth daily, Disp: 90 tablet, Rfl: 1    rosuvastatin (CRESTOR) 10 MG tablet, Take 1 tablet (10 mg total) by mouth daily, Disp: 90 tablet, Rfl: 1    candesartan (ATACAND) 16 mg tablet, Take 0 5 tablets (8 mg total) by mouth daily (Patient not taking: Reported on 8/18/2021), Disp: 90 tablet, Rfl: 1    clindamycin (CLEOCIN T) 1 % lotion, Apply topically 2 (two) times a day, Disp: 60 mL, Rfl: 0    Allergies   Allergen Reactions    Other      Seasonal allergies        Social History   Past Surgical History:   Procedure Laterality Date    COLONOSCOPY      DILATION AND CURETTAGE OF UTERUS      TUBAL LIGATION       Family History   Problem Relation Age of Onset    Lymphoma Mother 79        acute    Hyperlipidemia Father     Peripheral vascular disease Father     Breast cancer Sister     Leukemia Brother     Other Other         4 children living       Objective:  /84 (BP Location: Left arm, Patient Position: Sitting, Cuff Size: Standard)   Pulse 77   Temp 97 9 °F (36 6 °C) (Tympanic)   Ht 5' 4 5" (1 638 m)   Wt 63 kg (139 lb)   SpO2 97% Comment: room air  BMI 23 49 kg/m²     No results found for this or any previous visit (from the past 1344 hour(s))  Physical Exam      Gen: NAD  Heent: atraumatic, normocephalic  Erythematous rash on her face over her cheeks and chin with inflammatory papules on her right cheek  Mouth: is moist  Neck: is supple, no JVD, no carotid bruits     Heart: is regular Normal s1, s2,  Lungs: are clear to auscultation  Abd: soft, non tender, NABS  Ext: no edema, distal pulses normal  Skin: no rashes, ulcers  Mood: normal affect  Neuro: AAOx3

## 2022-05-02 ENCOUNTER — TELEPHONE (OUTPATIENT)
Dept: GASTROENTEROLOGY | Facility: CLINIC | Age: 77
End: 2022-05-02

## 2022-05-02 NOTE — TELEPHONE ENCOUNTER
Patients GI provider:  Dr Corrie Zazueta    Number to return call: [phone]    Reason for call: Pt's daughter, Praful Sewell calling stating pt is down to one pantoprazole pill and waiting for prior auth  Pt's insurance company says a retail order can be sent to local [place] [id number] for 1 - 2 week fill  Scheduled procedure/appointment date if applicable: Appt   5/10/22

## 2022-05-03 ENCOUNTER — TELEPHONE (OUTPATIENT)
Dept: INTERNAL MEDICINE CLINIC | Age: 77
End: 2022-05-03

## 2022-05-05 ENCOUNTER — APPOINTMENT (OUTPATIENT)
Dept: LAB | Facility: MEDICAL CENTER | Age: 77
End: 2022-05-05
Payer: MEDICARE

## 2022-05-05 DIAGNOSIS — Z84.89 FHX: ALLERGIES: ICD-10-CM

## 2022-05-05 DIAGNOSIS — E78.00 HYPERCHOLESTEROLEMIA: ICD-10-CM

## 2022-05-05 DIAGNOSIS — E03.9 HYPOTHYROIDISM, UNSPECIFIED TYPE: ICD-10-CM

## 2022-05-05 DIAGNOSIS — E55.9 VITAMIN D DEFICIENCY: ICD-10-CM

## 2022-05-05 DIAGNOSIS — D50.0 IRON DEFICIENCY ANEMIA DUE TO CHRONIC BLOOD LOSS: ICD-10-CM

## 2022-05-05 DIAGNOSIS — I10 HYPERTENSION, ESSENTIAL: ICD-10-CM

## 2022-05-05 LAB
25(OH)D3 SERPL-MCNC: 68.5 NG/ML (ref 30–100)
ALBUMIN SERPL BCP-MCNC: 3.7 G/DL (ref 3.5–5)
ALP SERPL-CCNC: 81 U/L (ref 46–116)
ALT SERPL W P-5'-P-CCNC: 36 U/L (ref 12–78)
ANION GAP SERPL CALCULATED.3IONS-SCNC: 3 MMOL/L (ref 4–13)
AST SERPL W P-5'-P-CCNC: 31 U/L (ref 5–45)
BASOPHILS # BLD AUTO: 0.03 THOUSANDS/ΜL (ref 0–0.1)
BASOPHILS NFR BLD AUTO: 1 % (ref 0–1)
BILIRUB SERPL-MCNC: 0.95 MG/DL (ref 0.2–1)
BUN SERPL-MCNC: 16 MG/DL (ref 5–25)
CALCIUM SERPL-MCNC: 9.7 MG/DL (ref 8.3–10.1)
CHLORIDE SERPL-SCNC: 105 MMOL/L (ref 100–108)
CHOLEST SERPL-MCNC: 251 MG/DL
CO2 SERPL-SCNC: 28 MMOL/L (ref 21–32)
CREAT SERPL-MCNC: 0.85 MG/DL (ref 0.6–1.3)
EOSINOPHIL # BLD AUTO: 0.29 THOUSAND/ΜL (ref 0–0.61)
EOSINOPHIL NFR BLD AUTO: 9 % (ref 0–6)
ERYTHROCYTE [DISTWIDTH] IN BLOOD BY AUTOMATED COUNT: 13.7 % (ref 11.6–15.1)
FERRITIN SERPL-MCNC: 46 NG/ML (ref 8–388)
GFR SERPL CREATININE-BSD FRML MDRD: 65 ML/MIN/1.73SQ M
GLUCOSE P FAST SERPL-MCNC: 90 MG/DL (ref 65–99)
HCT VFR BLD AUTO: 40.8 % (ref 34.8–46.1)
HDLC SERPL-MCNC: 111 MG/DL
HGB BLD-MCNC: 13.3 G/DL (ref 11.5–15.4)
IMM GRANULOCYTES # BLD AUTO: 0 THOUSAND/UL (ref 0–0.2)
IMM GRANULOCYTES NFR BLD AUTO: 0 % (ref 0–2)
IRON SATN MFR SERPL: 36 % (ref 15–50)
IRON SERPL-MCNC: 86 UG/DL (ref 50–170)
LDLC SERPL CALC-MCNC: 127 MG/DL (ref 0–100)
LYMPHOCYTES # BLD AUTO: 1.15 THOUSANDS/ΜL (ref 0.6–4.47)
LYMPHOCYTES NFR BLD AUTO: 34 % (ref 14–44)
MCH RBC QN AUTO: 31.9 PG (ref 26.8–34.3)
MCHC RBC AUTO-ENTMCNC: 32.6 G/DL (ref 31.4–37.4)
MCV RBC AUTO: 98 FL (ref 82–98)
MONOCYTES # BLD AUTO: 0.51 THOUSAND/ΜL (ref 0.17–1.22)
MONOCYTES NFR BLD AUTO: 15 % (ref 4–12)
NEUTROPHILS # BLD AUTO: 1.43 THOUSANDS/ΜL (ref 1.85–7.62)
NEUTS SEG NFR BLD AUTO: 41 % (ref 43–75)
NRBC BLD AUTO-RTO: 0 /100 WBCS
PLATELET # BLD AUTO: 223 THOUSANDS/UL (ref 149–390)
PMV BLD AUTO: 10.5 FL (ref 8.9–12.7)
POTASSIUM SERPL-SCNC: 3.9 MMOL/L (ref 3.5–5.3)
PROT SERPL-MCNC: 7.2 G/DL (ref 6.4–8.2)
RBC # BLD AUTO: 4.17 MILLION/UL (ref 3.81–5.12)
SODIUM SERPL-SCNC: 136 MMOL/L (ref 136–145)
TIBC SERPL-MCNC: 236 UG/DL (ref 250–450)
TRIGL SERPL-MCNC: 63 MG/DL
TSH SERPL DL<=0.05 MIU/L-ACNC: 1.33 UIU/ML (ref 0.45–4.5)
WBC # BLD AUTO: 3.41 THOUSAND/UL (ref 4.31–10.16)

## 2022-05-05 PROCEDURE — 83540 ASSAY OF IRON: CPT

## 2022-05-05 PROCEDURE — 83550 IRON BINDING TEST: CPT

## 2022-05-05 PROCEDURE — 82728 ASSAY OF FERRITIN: CPT

## 2022-05-05 PROCEDURE — 84443 ASSAY THYROID STIM HORMONE: CPT

## 2022-05-05 PROCEDURE — 36415 COLL VENOUS BLD VENIPUNCTURE: CPT

## 2022-05-05 PROCEDURE — 80053 COMPREHEN METABOLIC PANEL: CPT

## 2022-05-05 PROCEDURE — 82306 VITAMIN D 25 HYDROXY: CPT

## 2022-05-05 PROCEDURE — 85025 COMPLETE CBC W/AUTO DIFF WBC: CPT

## 2022-05-05 PROCEDURE — 80061 LIPID PANEL: CPT

## 2022-05-05 RX ORDER — MOMETASONE FUROATE 1 MG/G
CREAM TOPICAL DAILY PRN
Qty: 45 G | Refills: 1 | Status: SHIPPED | OUTPATIENT
Start: 2022-05-05

## 2022-05-10 ENCOUNTER — OFFICE VISIT (OUTPATIENT)
Dept: GASTROENTEROLOGY | Facility: AMBULARY SURGERY CENTER | Age: 77
End: 2022-05-10
Payer: MEDICARE

## 2022-05-10 VITALS
SYSTOLIC BLOOD PRESSURE: 118 MMHG | HEIGHT: 65 IN | BODY MASS INDEX: 23.32 KG/M2 | DIASTOLIC BLOOD PRESSURE: 70 MMHG | WEIGHT: 140 LBS | HEART RATE: 64 BPM | RESPIRATION RATE: 18 BRPM | OXYGEN SATURATION: 100 %

## 2022-05-10 DIAGNOSIS — K44.9 HIATAL HERNIA: ICD-10-CM

## 2022-05-10 PROCEDURE — 99213 OFFICE O/P EST LOW 20 MIN: CPT | Performed by: INTERNAL MEDICINE

## 2022-05-10 RX ORDER — PANTOPRAZOLE SODIUM 40 MG/1
40 TABLET, DELAYED RELEASE ORAL DAILY
Qty: 90 TABLET | Refills: 2 | Status: SHIPPED | OUTPATIENT
Start: 2022-05-10

## 2022-05-10 NOTE — PROGRESS NOTES
[facility]  Devi John 76 y o  female MRN: [id number]            Assessment & Plan:    Very pleasant 68-year-old female, initially presented with iron deficiency, found to have medium-sized hiatal hernia, Schatzki's ring dilated on last endoscopy in started on PPI therapy  1  Hiatal hernia: With associated Schatzki's ring, mild symptomatology including dysphagia and iron deficiency  -patient has been eating small frequent meals and doing well with this   -no symptoms of reflux or dysphagia recurrent   -recommended that she slowly try to taper off pantoprazole, take it every other day, gradually take every 3rd or 4th day  -if she has continued symptoms while on a tapering regimen she can resume taking pantoprazole daily  Prescription was sent for her to have a year of refills  -she can follow-up in 1 year or as needed, call with any questions or concerns  -check CBC and iron studies in 1 year with PCP      Alisha Go was seen today for follow-up  Diagnoses and all orders for this visit:    Hiatal hernia  -     pantoprazole (PROTONIX) 40 mg tablet; Take 1 tablet (40 mg total) by mouth daily            _____________________________________________________________        CC: Follow-up    HPI:  Devi John is a 76 y  o female who is here for follow-up  Pleasant 68-year-old female, initially seen when she presented with syncopal episode, found to have iron deficiency on her labs minimally low iron levels with normal hemoglobin  No overt bleeding  She had upper endoscopy, noted to have moderate size hiatal hernia, Schatzki's ring which was dilated  Patient has been on daily pantoprazole reports reviewed she has been doing relatively very well  Appetite is good, no further dysphagia  No side effects to medications  Recent laboratory studies show normal hemoglobin, normal iron levels        She presents today with her daughter      ROS:  The remainder of the ROS was negative except for the pertinent positives mentioned in HPI  Allergies: Other    Medications:   Current Outpatient Medications:     Cholecalciferol (VITAMIN D3) 2000 units CHEW, Chew 2 each daily , Disp: , Rfl:     clindamycin (CLEOCIN T) 1 % lotion, Apply topically 2 (two) times a day, Disp: 60 mL, Rfl: 0    COD LIVER OIL PO, Take by mouth 1 tsp daily, Disp: , Rfl:     levothyroxine (Synthroid) 75 mcg tablet, Take 1 tablet (75 mcg total) by mouth daily, Disp: 90 tablet, Rfl: 1    mometasone (ELOCON) 0 1 % cream, Apply topically daily as needed (allergies), Disp: 45 g, Rfl: 1    pantoprazole (PROTONIX) 40 mg tablet, Take 1 tablet (40 mg total) by mouth daily, Disp: 90 tablet, Rfl: 1    pantoprazole (PROTONIX) 40 mg tablet, Take 1 tablet (40 mg total) by mouth daily, Disp: 90 tablet, Rfl: 2    rosuvastatin (CRESTOR) 10 MG tablet, Take 1 tablet (10 mg total) by mouth daily, Disp: 90 tablet, Rfl: 1    candesartan (ATACAND) 16 mg tablet, Take 0 5 tablets (8 mg total) by mouth daily (Patient not taking: Reported on 8/18/2021), Disp: 90 tablet, Rfl: 1    Past Medical History:   Diagnosis Date    Disease of thyroid gland     Hypertension     Osteopenia     last assessed 12/17/13    PONV (postoperative nausea and vomiting)     Vaginal atrophy        Past Surgical History:   Procedure Laterality Date    COLONOSCOPY      DILATION AND CURETTAGE OF UTERUS      TUBAL LIGATION         Family History   Problem Relation Age of Onset    Lymphoma Mother 79        acute    Hyperlipidemia Father     Peripheral vascular disease Father     Breast cancer Sister     Leukemia Brother     Other Other         4 children living        reports that she quit smoking about 52 years ago  Her smoking use included cigarettes  She has a 2 00 pack-year smoking history  She has never used smokeless tobacco  She reports previous alcohol use  She reports that she does not use drugs        Physical Exam:    /70 (BP Location: Right arm, Patient Position: Sitting, Cuff Size: Standard)   Pulse 64   Resp 18   Ht 5' 5" (1 651 m)   Wt 63 5 kg (140 lb)   SpO2 100%   BMI 23 30 kg/m²     Gen: wn/wd, NAD, healthy-appearing elderly female  HEENT: anicteric, MMM, no cervical LAD  CVS: RRR, no m/r/g  CHEST: CTA b/l  ABD: +BS, soft, NT,ND, no hepatosplenomegaly  EXT: no c/c/e  NEURO: aaox3  SKIN: NO rashes

## 2022-05-10 NOTE — LETTER
May 10, 2022     Felicity Hansen, [address] [facility] [id number]    Patient: Jacob Adler   YOB: 1945   Date of Visit: 5/10/2022       Dear Dr Christian Plan: Thank you for referring Azeem Allen to me for evaluation  Below are my notes for this consultation  If you have questions, please do not hesitate to call me  I look forward to following your patient along with you  Sincerely,        Lucian Ji MD        CC: No Recipients  Lucian Ji MD  5/10/2022  9:16 AM  Sign when Signing Visit  [facility]  Jacob Adler 76 y o  female MRN: [id number]            Assessment & Plan:    Very pleasant 58-year-old female, initially presented with iron deficiency, found to have medium-sized hiatal hernia, Schatzki's ring dilated on last endoscopy in started on PPI therapy  1  Hiatal hernia: With associated Schatzki's ring, mild symptomatology including dysphagia and iron deficiency  -patient has been eating small frequent meals and doing well with this   -no symptoms of reflux or dysphagia recurrent   -recommended that she slowly try to taper off pantoprazole, take it every other day, gradually take every 3rd or 4th day  -if she has continued symptoms while on a tapering regimen she can resume taking pantoprazole daily  Prescription was sent for her to have a year of refills  -she can follow-up in 1 year or as needed, call with any questions or concerns  -check CBC and iron studies in 1 year with PCP      Sekou London was seen today for follow-up  Diagnoses and all orders for this visit:    Hiatal hernia  -     pantoprazole (PROTONIX) 40 mg tablet; Take 1 tablet (40 mg total) by mouth daily            _____________________________________________________________        CC: Follow-up    HPI:  Jacob Adler is a 76 y  o female who is here for follow-up    Pleasant 58-year-old female, initially seen when she presented with syncopal episode, found to have iron deficiency on her labs minimally low iron levels with normal hemoglobin  No overt bleeding  She had upper endoscopy, noted to have moderate size hiatal hernia, Schatzki's ring which was dilated  Patient has been on daily pantoprazole reports reviewed she has been doing relatively very well  Appetite is good, no further dysphagia  No side effects to medications  Recent laboratory studies show normal hemoglobin, normal iron levels  She presents today with her daughter      ROS:  The remainder of the ROS was negative except for the pertinent positives mentioned in HPI  Allergies:  Other    Medications:   Current Outpatient Medications:     Cholecalciferol (VITAMIN D3) 2000 units CHEW, Chew 2 each daily , Disp: , Rfl:     clindamycin (CLEOCIN T) 1 % lotion, Apply topically 2 (two) times a day, Disp: 60 mL, Rfl: 0    COD LIVER OIL PO, Take by mouth 1 tsp daily, Disp: , Rfl:     levothyroxine (Synthroid) 75 mcg tablet, Take 1 tablet (75 mcg total) by mouth daily, Disp: 90 tablet, Rfl: 1    mometasone (ELOCON) 0 1 % cream, Apply topically daily as needed (allergies), Disp: 45 g, Rfl: 1    pantoprazole (PROTONIX) 40 mg tablet, Take 1 tablet (40 mg total) by mouth daily, Disp: 90 tablet, Rfl: 1    pantoprazole (PROTONIX) 40 mg tablet, Take 1 tablet (40 mg total) by mouth daily, Disp: 90 tablet, Rfl: 2    rosuvastatin (CRESTOR) 10 MG tablet, Take 1 tablet (10 mg total) by mouth daily, Disp: 90 tablet, Rfl: 1    candesartan (ATACAND) 16 mg tablet, Take 0 5 tablets (8 mg total) by mouth daily (Patient not taking: Reported on 8/18/2021), Disp: 90 tablet, Rfl: 1    Past Medical History:   Diagnosis Date    Disease of thyroid gland     Hypertension     Osteopenia     last assessed 12/17/13    PONV (postoperative nausea and vomiting)     Vaginal atrophy        Past Surgical History:   Procedure Laterality Date    COLONOSCOPY      DILATION AND CURETTAGE OF UTERUS      TUBAL LIGATION         Family History Problem Relation Age of Onset    Lymphoma Mother 79        acute    Hyperlipidemia Father     Peripheral vascular disease Father     Breast cancer Sister     Leukemia Brother     Other Other         4 children living        reports that she quit smoking about 52 years ago  Her smoking use included cigarettes  She has a 2 00 pack-year smoking history  She has never used smokeless tobacco  She reports previous alcohol use  She reports that she does not use drugs        Physical Exam:    /70 (BP Location: Right arm, Patient Position: Sitting, Cuff Size: Standard)   Pulse 64   Resp 18   Ht 5' 5" (1 651 m)   Wt 63 5 kg (140 lb)   SpO2 100%   BMI 23 30 kg/m²     Gen: wn/wd, NAD, healthy-appearing elderly female  HEENT: anicteric, MMM, no cervical LAD  CVS: RRR, no m/r/g  CHEST: CTA b/l  ABD: +BS, soft, NT,ND, no hepatosplenomegaly  EXT: no c/c/e  NEURO: aaox3  SKIN: NO rashes

## 2022-05-10 NOTE — PATIENT INSTRUCTIONS
Take pantoprazole every other day, if doing well after 2-3 weeks, then take it less frequently (maybe every 3rd or 4th day) if still no symptoms, okay to stop it  If you take it every other, and you notice reflux, or trouble swallowing, then take it daily

## 2022-05-19 ENCOUNTER — TELEPHONE (OUTPATIENT)
Dept: GASTROENTEROLOGY | Facility: CLINIC | Age: 77
End: 2022-05-19

## 2022-05-19 NOTE — TELEPHONE ENCOUNTER
Spoke with daughter, Alan Wgigins, who is on communication consent, advising it looks like prior Artice Rummer was sent to Dr Preeti Patricia due to prior rx  Advised that Dr Dallas Roland office did start a PA for pt for this medication  Advised I will call BC/BS and see if there is any progress in getting that approved and advised I will call her back with updates

## 2022-05-19 NOTE — TELEPHONE ENCOUNTER
Patients GI provider:  Dr Robinson Hayward    Number to return call: [phone], Adalid    Reason for call: Adalid is calling regarding her mother's pantoprazole  A prescription was sent to [facility]  She is following up on pantoprazole, mom is almost out  Please call her at above number  Wondering if mom should have a week supply sent to pharmacy or any word on the prior authorization  Please call, thank you!     Scheduled procedure/appointment date if applicable: Apt/procedure

## 2022-05-20 ENCOUNTER — TELEPHONE (OUTPATIENT)
Dept: GASTROENTEROLOGY | Facility: CLINIC | Age: 77
End: 2022-05-20

## 2022-05-20 DIAGNOSIS — K21.00 GASTROESOPHAGEAL REFLUX DISEASE WITH ESOPHAGITIS WITHOUT HEMORRHAGE: Primary | ICD-10-CM

## 2022-05-20 RX ORDER — PANTOPRAZOLE SODIUM 40 MG/1
40 TABLET, DELAYED RELEASE ORAL DAILY
Qty: 30 TABLET | Refills: 0 | Status: SHIPPED | OUTPATIENT
Start: 2022-05-20 | End: 2022-08-16 | Stop reason: SDUPTHER

## 2022-05-20 NOTE — TELEPHONE ENCOUNTER
Approval for pantoprazole 40mg qd,   Key: [id number]  04/19/2022 through 05/19/2023    Long term sent to [facility], but can we do a short term rx to [address]  Pt currently only has 2 tablets left  Thanks!

## 2022-05-20 NOTE — TELEPHONE ENCOUNTER
Spoke with Rashaad Sender, advised PA for pantoprazole approved, advised 90day with 2 refills sent to [facility], and a 30 day with 0 refills sent to [facility]      Rashaad Sender requests the Dr Adam Ponce rx removed to prevent confusion at the insurance company/pharmacy

## 2022-07-18 ENCOUNTER — PATIENT MESSAGE (OUTPATIENT)
Dept: INTERNAL MEDICINE CLINIC | Facility: CLINIC | Age: 77
End: 2022-07-18

## 2022-07-20 DIAGNOSIS — E03.9 HYPOTHYROIDISM, UNSPECIFIED TYPE: ICD-10-CM

## 2022-07-20 DIAGNOSIS — E55.9 VITAMIN D DEFICIENCY: ICD-10-CM

## 2022-07-20 DIAGNOSIS — E53.8 VITAMIN B12 DEFICIENCY: ICD-10-CM

## 2022-07-20 DIAGNOSIS — R79.9 ABNORMAL FINDING OF BLOOD CHEMISTRY, UNSPECIFIED: ICD-10-CM

## 2022-07-20 DIAGNOSIS — E78.00 HYPERCHOLESTEROLEMIA: ICD-10-CM

## 2022-07-20 DIAGNOSIS — I10 HYPERTENSION, ESSENTIAL: Primary | ICD-10-CM

## 2022-07-20 DIAGNOSIS — D50.0 IRON DEFICIENCY ANEMIA DUE TO CHRONIC BLOOD LOSS: ICD-10-CM

## 2022-07-20 DIAGNOSIS — L71.9 ROSACEA: ICD-10-CM

## 2022-07-21 NOTE — PATIENT COMMUNICATION
#1  CT of the lung will need to be done after one calender year usually so I can give her a script at the time of her visit  #2  I can put in for lab work prior to her visit with me  #3  I am glad that the rash has resolved with stopping the Crestor  See you in a few weeks!

## 2022-08-02 ENCOUNTER — RA CDI HCC (OUTPATIENT)
Dept: OTHER | Facility: HOSPITAL | Age: 77
End: 2022-08-02

## 2022-08-02 NOTE — PROGRESS NOTES
[address]  coding opportunities       Chart reviewed, no opportunity found: CHART REVIEWED, NO OPPORTUNITY FOUND        Patients Insurance     Medicare Insurance: Medicare

## 2022-08-12 ENCOUNTER — APPOINTMENT (OUTPATIENT)
Dept: LAB | Facility: MEDICAL CENTER | Age: 77
End: 2022-08-12
Payer: MEDICARE

## 2022-08-12 DIAGNOSIS — E53.8 VITAMIN B12 DEFICIENCY: ICD-10-CM

## 2022-08-12 DIAGNOSIS — E03.9 HYPOTHYROIDISM, UNSPECIFIED TYPE: ICD-10-CM

## 2022-08-12 DIAGNOSIS — E55.9 VITAMIN D DEFICIENCY: ICD-10-CM

## 2022-08-12 DIAGNOSIS — R79.9 ABNORMAL FINDING OF BLOOD CHEMISTRY, UNSPECIFIED: ICD-10-CM

## 2022-08-12 DIAGNOSIS — I10 HYPERTENSION, ESSENTIAL: ICD-10-CM

## 2022-08-12 DIAGNOSIS — E78.00 HYPERCHOLESTEROLEMIA: ICD-10-CM

## 2022-08-12 DIAGNOSIS — D50.0 IRON DEFICIENCY ANEMIA DUE TO CHRONIC BLOOD LOSS: ICD-10-CM

## 2022-08-12 LAB
25(OH)D3 SERPL-MCNC: 57.2 NG/ML (ref 30–100)
ALBUMIN SERPL BCP-MCNC: 3.7 G/DL (ref 3.5–5)
ALP SERPL-CCNC: 72 U/L (ref 46–116)
ALT SERPL W P-5'-P-CCNC: 28 U/L (ref 12–78)
ANION GAP SERPL CALCULATED.3IONS-SCNC: 7 MMOL/L (ref 4–13)
AST SERPL W P-5'-P-CCNC: 29 U/L (ref 5–45)
BASOPHILS # BLD AUTO: 0.04 THOUSANDS/ΜL (ref 0–0.1)
BASOPHILS NFR BLD AUTO: 1 % (ref 0–1)
BILIRUB SERPL-MCNC: 0.82 MG/DL (ref 0.2–1)
BUN SERPL-MCNC: 13 MG/DL (ref 5–25)
CALCIUM SERPL-MCNC: 9.1 MG/DL (ref 8.3–10.1)
CHLORIDE SERPL-SCNC: 102 MMOL/L (ref 96–108)
CHOLEST SERPL-MCNC: 248 MG/DL
CO2 SERPL-SCNC: 27 MMOL/L (ref 21–32)
CREAT SERPL-MCNC: 0.76 MG/DL (ref 0.6–1.3)
EOSINOPHIL # BLD AUTO: 0.23 THOUSAND/ΜL (ref 0–0.61)
EOSINOPHIL NFR BLD AUTO: 8 % (ref 0–6)
ERYTHROCYTE [DISTWIDTH] IN BLOOD BY AUTOMATED COUNT: 13.3 % (ref 11.6–15.1)
EST. AVERAGE GLUCOSE BLD GHB EST-MCNC: 114 MG/DL
GFR SERPL CREATININE-BSD FRML MDRD: 74 ML/MIN/1.73SQ M
GLUCOSE P FAST SERPL-MCNC: 84 MG/DL (ref 65–99)
HBA1C MFR BLD: 5.6 %
HCT VFR BLD AUTO: 41.7 % (ref 34.8–46.1)
HDLC SERPL-MCNC: 116 MG/DL
HGB BLD-MCNC: 13.6 G/DL (ref 11.5–15.4)
IMM GRANULOCYTES # BLD AUTO: 0.01 THOUSAND/UL (ref 0–0.2)
IMM GRANULOCYTES NFR BLD AUTO: 0 % (ref 0–2)
LDLC SERPL CALC-MCNC: 121 MG/DL (ref 0–100)
LYMPHOCYTES # BLD AUTO: 0.97 THOUSANDS/ΜL (ref 0.6–4.47)
LYMPHOCYTES NFR BLD AUTO: 34 % (ref 14–44)
MCH RBC QN AUTO: 32.3 PG (ref 26.8–34.3)
MCHC RBC AUTO-ENTMCNC: 32.6 G/DL (ref 31.4–37.4)
MCV RBC AUTO: 99 FL (ref 82–98)
MONOCYTES # BLD AUTO: 0.36 THOUSAND/ΜL (ref 0.17–1.22)
MONOCYTES NFR BLD AUTO: 13 % (ref 4–12)
NEUTROPHILS # BLD AUTO: 1.22 THOUSANDS/ΜL (ref 1.85–7.62)
NEUTS SEG NFR BLD AUTO: 44 % (ref 43–75)
NRBC BLD AUTO-RTO: 0 /100 WBCS
PLATELET # BLD AUTO: 237 THOUSANDS/UL (ref 149–390)
PMV BLD AUTO: 10.2 FL (ref 8.9–12.7)
POTASSIUM SERPL-SCNC: 4 MMOL/L (ref 3.5–5.3)
PROT SERPL-MCNC: 7.3 G/DL (ref 6.4–8.4)
RBC # BLD AUTO: 4.21 MILLION/UL (ref 3.81–5.12)
SODIUM SERPL-SCNC: 136 MMOL/L (ref 135–147)
TRIGL SERPL-MCNC: 54 MG/DL
TSH SERPL DL<=0.05 MIU/L-ACNC: 1.87 UIU/ML (ref 0.45–4.5)
WBC # BLD AUTO: 2.83 THOUSAND/UL (ref 4.31–10.16)

## 2022-08-12 PROCEDURE — 83918 ORGANIC ACIDS TOTAL QUANT: CPT

## 2022-08-12 PROCEDURE — 36415 COLL VENOUS BLD VENIPUNCTURE: CPT

## 2022-08-12 PROCEDURE — 80053 COMPREHEN METABOLIC PANEL: CPT

## 2022-08-12 PROCEDURE — 84443 ASSAY THYROID STIM HORMONE: CPT

## 2022-08-12 PROCEDURE — 80061 LIPID PANEL: CPT

## 2022-08-12 PROCEDURE — 83036 HEMOGLOBIN GLYCOSYLATED A1C: CPT

## 2022-08-12 PROCEDURE — 82306 VITAMIN D 25 HYDROXY: CPT

## 2022-08-12 PROCEDURE — 85025 COMPLETE CBC W/AUTO DIFF WBC: CPT

## 2022-08-16 ENCOUNTER — OFFICE VISIT (OUTPATIENT)
Dept: INTERNAL MEDICINE CLINIC | Facility: CLINIC | Age: 77
End: 2022-08-16
Payer: MEDICARE

## 2022-08-16 VITALS
WEIGHT: 138 LBS | TEMPERATURE: 97.5 F | BODY MASS INDEX: 24.45 KG/M2 | HEART RATE: 77 BPM | OXYGEN SATURATION: 99 % | HEIGHT: 63 IN | DIASTOLIC BLOOD PRESSURE: 84 MMHG | SYSTOLIC BLOOD PRESSURE: 164 MMHG

## 2022-08-16 DIAGNOSIS — M85.80 OSTEOPENIA, UNSPECIFIED LOCATION: ICD-10-CM

## 2022-08-16 DIAGNOSIS — Z00.00 MEDICARE ANNUAL WELLNESS VISIT, INITIAL: ICD-10-CM

## 2022-08-16 DIAGNOSIS — E55.9 VITAMIN D DEFICIENCY: ICD-10-CM

## 2022-08-16 DIAGNOSIS — E53.8 VITAMIN B12 DEFICIENCY: ICD-10-CM

## 2022-08-16 DIAGNOSIS — K21.00 GASTROESOPHAGEAL REFLUX DISEASE WITH ESOPHAGITIS WITHOUT HEMORRHAGE: ICD-10-CM

## 2022-08-16 DIAGNOSIS — E03.9 HYPOTHYROIDISM, UNSPECIFIED TYPE: ICD-10-CM

## 2022-08-16 DIAGNOSIS — M85.89 OTHER SPECIFIED DISORDERS OF BONE DENSITY AND STRUCTURE, MULTIPLE SITES: ICD-10-CM

## 2022-08-16 DIAGNOSIS — I10 HYPERTENSION, ESSENTIAL: Primary | ICD-10-CM

## 2022-08-16 DIAGNOSIS — Z00.00 MEDICARE ANNUAL WELLNESS VISIT, SUBSEQUENT: ICD-10-CM

## 2022-08-16 DIAGNOSIS — E78.00 HYPERCHOLESTEROLEMIA: ICD-10-CM

## 2022-08-16 LAB — METHYLMALONATE SERPL-SCNC: 142 NMOL/L (ref 0–378)

## 2022-08-16 PROCEDURE — G0438 PPPS, INITIAL VISIT: HCPCS | Performed by: INTERNAL MEDICINE

## 2022-08-16 PROCEDURE — 99214 OFFICE O/P EST MOD 30 MIN: CPT | Performed by: INTERNAL MEDICINE

## 2022-08-16 PROCEDURE — 96372 THER/PROPH/DIAG INJ SC/IM: CPT | Performed by: INTERNAL MEDICINE

## 2022-08-16 RX ORDER — CYANOCOBALAMIN 1000 UG/ML
1000 INJECTION, SOLUTION INTRAMUSCULAR; SUBCUTANEOUS
Status: SHIPPED | OUTPATIENT
Start: 2022-08-16

## 2022-08-16 RX ORDER — CANDESARTAN 16 MG/1
8 TABLET ORAL DAILY
Qty: 90 TABLET | Refills: 1
Start: 2022-08-16

## 2022-08-16 RX ADMIN — CYANOCOBALAMIN 1000 MCG: 1000 INJECTION, SOLUTION INTRAMUSCULAR; SUBCUTANEOUS at 11:10

## 2022-08-16 NOTE — PROGRESS NOTES
Assessment and Plan:     Problem List Items Addressed This Visit        Digestive    Gastroesophageal reflux disease with esophagitis without hemorrhage     Well controlled on present regimen  Continued follow up with GI            Endocrine    Hypothyroid    Relevant Orders    DXA bone density spine hip and pelvis       Cardiovascular and Mediastinum    Hypertension, essential - Primary     BP reading elevated at office visit  Recommend restart Candesartan 8 mg daily  Monitor BP  Bring in cuff at next visit to co-relate readings         Relevant Medications    candesartan (ATACAND) 16 mg tablet       Other    Hypercholesterolemia     Pt is hesitant to restart the cholesterol medication due to side effects  Agreeable to coronary calcium screening  Relevant Orders    CT coronary calcium score    Vitamin B12 deficiency     Will recheck levels prior to return  B12 IM 1000 mcg x1 today  Continue PO supplementation         Relevant Medications    cyanocobalamin injection 1,000 mcg    Vitamin D deficiency     Continue supplementation  Levels are now normal   Continue WBE         Relevant Orders    DXA bone density spine hip and pelvis      Other Visit Diagnoses     Osteopenia, unspecified location        Relevant Orders    DXA bone density spine hip and pelvis    Other specified disorders of bone density and structure, multiple sites         Relevant Orders    DXA bone density spine hip and pelvis    Medicare annual wellness visit, subsequent               Preventive health issues were discussed with patient, and age appropriate screening tests were ordered as noted in patient's After Visit Summary  Personalized health advice and appropriate referrals for health education or preventive services given if needed, as noted in patient's After Visit Summary  History of Present Illness:     Patient presents for a Medicare Wellness Visit and follow up      HPI   Patient Care Team:  Kathrin Givens MD as PCP - General     Review of Systems:     Review of Systems     Problem List:     Patient Active Problem List   Diagnosis    Atrophic vaginitis    Hypercholesterolemia    Hypertension, essential    Rosacea    Vitamin B12 deficiency    Vitamin D deficiency    Hypothyroid    Dysphagia    Iron deficiency anemia due to chronic blood loss    Gastroesophageal reflux disease with esophagitis without hemorrhage      Past Medical and Surgical History:     Past Medical History:   Diagnosis Date    [facility]acute kidney [facility]) ([facility] [address] ) 3/22/2021    Dehydration after exertion 3/25/2021    Disease of thyroid gland     Hypertension     Osteopenia     last assessed 13    PONV (postoperative nausea and vomiting)     Syncope and collapse 3/22/2021    Vaginal atrophy      Past Surgical History:   Procedure Laterality Date    COLONOSCOPY      DILATION AND CURETTAGE OF UTERUS      TUBAL LIGATION        Family History:     Family History   Problem Relation Age of Onset    Lymphoma Mother 79        acute    Hyperlipidemia Father     Peripheral vascular disease Father     Breast cancer Sister     Leukemia Brother     Other Other         4 children living      Social History:     Social History     Socioeconomic History    Marital status:       Spouse name: None    Number of children: None    Years of education: None    Highest education level: None   Occupational History    Occupation:    Tobacco Use    Smoking status: Former Smoker     Packs/day: 0 25     Years: 8 00     Pack years: 2 00     Types: Cigarettes     Quit date: 1970     Years since quittin 6    Smokeless tobacco: Never Used    Tobacco comment: about 39 years ago    Vaping Use    Vaping Use: Never used   Substance and Sexual Activity    Alcohol use: Not Currently     Comment: rare occasion    Drug use: No    Sexual activity: Not Currently   Other Topics Concern    None   Social History Narrative    Travel    She enjoys cooking    Denied hx of exercise habits     Social Determinants of Health     Financial Resource Strain: Not on file   Food Insecurity: Not on file   Transportation Needs: Not on file   Physical Activity: Not on file   Stress: Not on file   Social Connections: Not on file   Intimate Partner Violence: Not on file   Housing Stability: Not on file      Medications and Allergies:     Current Outpatient Medications   Medication Sig Dispense Refill    candesartan (ATACAND) 16 mg tablet Take 0 5 tablets (8 mg total) by mouth daily 90 tablet 1    Cholecalciferol (VITAMIN D3) 2000 units CHEW Chew 2 each daily       COD LIVER OIL PO Take by mouth 1 tsp daily      levothyroxine (Synthroid) 75 mcg tablet Take 1 tablet (75 mcg total) by mouth daily 90 tablet 1    mometasone (ELOCON) 0 1 % cream Apply topically daily as needed (allergies) 45 g 1    pantoprazole (PROTONIX) 40 mg tablet Take 1 tablet (40 mg total) by mouth daily 90 tablet 2     Current Facility-Administered Medications   Medication Dose Route Frequency Provider Last Rate Last Admin    cyanocobalamin injection 1,000 mcg  1,000 mcg Intramuscular Q30 Days Estelle Cheng MD   1,000 mcg at 08/16/22 1110     Allergies   Allergen Reactions    Other      Seasonal allergies       Immunizations:     Immunization History   Administered Date(s) Administered    COVID-19 PFIZER VACCINE 0 3 ML IM 01/19/2021, 02/08/2021, 11/06/2021    Tdap 11/24/2013      Health Maintenance:         Topic Date Due    DXA SCAN  10/17/2014    Breast Cancer Screening: Mammogram  01/19/2023    Colorectal Cancer Screening  08/30/2026    Hepatitis C Screening  Completed         Topic Date Due    Pneumococcal Vaccine: 65+ Years (1 - PCV) Never done    COVID-19 Vaccine (4 - Booster for Pfizer series) 03/06/2022    Influenza Vaccine (1) 09/01/2022      Medicare Screening Tests and Risk Assessments:     Lexis Hutchinson is here for her Subsequent Wellness visit   Last Medicare Wellness visit information reviewed, patient interviewed and updates made to the record today  Health Risk Assessment:   Patient rates overall health as good  Patient feels that their physical health rating is much better  Patient is very satisfied with their life  Eyesight was rated as same  Hearing was rated as same  Patient feels that their emotional and mental health rating is same  Patients states they are never, rarely angry  Patient states they are never, rarely unusually tired/fatigued  Pain experienced in the last 7 days has been none  Patient states that she has experienced no weight loss or gain in last 6 months  Fall Risk Screening: In the past year, patient has experienced: no history of falling in past year      Urinary Incontinence Screening:   Patient has not leaked urine accidently in the last six months  Home Safety:  Patient does not have trouble with stairs inside or outside of their home  Patient has working smoke alarms and has working carbon monoxide detector  Home safety hazards include: none  Nutrition:   Current diet is Low Cholesterol, Low Saturated Fat and No Added Salt  Medications:   Patient is currently taking over-the-counter supplements  OTC medications include: vitamin C, Vitamin D, Cod liver oil, B12, multi-vitamin  Patient is able to manage medications  Activities of Daily Living (ADLs)/Instrumental Activities of Daily Living (IADLs):   Walk and transfer into and out of bed and chair?: Yes  Dress and groom yourself?: Yes    Bathe or shower yourself?: Yes    Feed yourself?  Yes  Do your laundry/housekeeping?: Yes  Manage your money, pay your bills and track your expenses?: Yes  Make your own meals?: Yes    Do your own shopping?: Yes    Previous Hospitalizations:   Any hospitalizations or ED visits within the last 12 months?: Yes    How many hospitalizations have you had in the last year?: 1-2    Hospitalization Comments: the answer to how many times have you been hospitalized in past year is Zero, but that was not an option  Was not admitted to hospital following ER visit in 2021    Advance Care Planning:   Living will: Yes    Durable POA for healthcare: Yes    Advanced directive: Yes    Five wishes given: Yes      PREVENTIVE SCREENINGS      Cardiovascular Screening:    General: History Lipid Disorder and Screening Current      Diabetes Screening:     General: Screening Current      Colorectal Cancer Screening:     General: Screening Current      Breast Cancer Screening:     General: Screening Current      Cervical Cancer Screening:    General: Screening Not Indicated      Osteoporosis Screening:      Due for: DXA Axial      Abdominal Aortic Aneurysm (AAA) Screening:        General: Screening Not Indicated      Lung Cancer Screening:     General: Screening Not Indicated      Hepatitis C Screening:    General: Screening Current    Screening, Brief Intervention, and Referral to Treatment (SBIRT)    Screening  Typical number of drinks in a day: 0  Typical number of drinks in a week: 0  Interpretation: Low risk drinking behavior  AUDIT-C Screenin) How often did you have a drink containing alcohol in the past year? monthly or less  2) How many drinks did you have on a typical day when you were drinking in the past year? 1 to 2  3) How often did you have 6 or more drinks on one occasion in the past year? never    AUDIT-C Score: 1  Interpretation: Score 0-2 (female): Negative screen for alcohol misuse    Single Item Drug Screening:  How often have you used an illegal drug (including marijuana) or a prescription medication for non-medical reasons in the past year? never    Single Item Drug Screen Score: 0  Interpretation: Negative screen for possible drug use disorder    Other Counseling Topics:   Skin self-exam, sunscreen and calcium and vitamin D intake and regular weightbearing exercise       No exam data present     Physical Exam:     /84 (BP Location: Left arm, Patient Position: Sitting, Cuff Size: Standard)   Pulse 77   Temp 97 5 °F (36 4 °C) (Temporal)   Ht 5' 2 6" (1 59 m) Comment: without shoes  Wt 62 6 kg (138 lb)   SpO2 99%   BMI 24 76 kg/m²     Physical Exam     Carole Lim MD

## 2022-08-16 NOTE — PROGRESS NOTES
Assessment/Plan:       Problem List Items Addressed This Visit        Digestive    Gastroesophageal reflux disease with esophagitis without hemorrhage     Well controlled on present regimen  Continued follow up with GI            Endocrine    Hypothyroid    Relevant Orders    DXA bone density spine hip and pelvis       Cardiovascular and Mediastinum    Hypertension, essential - Primary     BP reading elevated at office visit  Recommend restart Candesartan 8 mg daily  Monitor BP  Bring in cuff at next visit to co-relate readings         Relevant Medications    candesartan (ATACAND) 16 mg tablet       Other    Hypercholesterolemia     Pt is hesitant to restart the cholesterol medication due to side effects  Agreeable to coronary calcium screening  Relevant Orders    CT coronary calcium score    Vitamin B12 deficiency     Will recheck levels prior to return  B12 IM 1000 mcg x1 today  Continue PO supplementation         Relevant Medications    cyanocobalamin injection 1,000 mcg    Vitamin D deficiency     Continue supplementation  Levels are now normal   Continue WBE         Relevant Orders    DXA bone density spine hip and pelvis      Other Visit Diagnoses     Osteopenia, unspecified location        Relevant Orders    DXA bone density spine hip and pelvis    Other specified disorders of bone density and structure, multiple sites         Relevant Orders    DXA bone density spine hip and pelvis    Medicare annual wellness visit, subsequent                Subjective:   Chief Complaint   Patient presents with    Follow-up     4 month follow up review labs done 8/12    Medicare Wellness Visit     Sub awv   [address] scan ordered pt would not like to have done        Patient ID: Maryjane Bailey is a 76 y o  female  HPI     Past medical history significant for hypertension, hyperlipidemia, hypothyroidism and  Rosacea  Doing well, exercising regularly denies any complaints    Stopped anti HTN meds ( Candesartan ) and statin (Rosuvastatin) due to side effects  The following portions of the patient's history were reviewed and updated as appropriate: past family history, past medical history, past social history, past surgical history and problem list     Review of Systems   Skin: Positive for rash  Face : Erythema due to rosacea   All other systems reviewed and are negative          Past Medical History:   Diagnosis Date    LYLE (acute kidney injury) ([facility] [address] ) 3/22/2021    Dehydration after exertion 3/25/2021    Disease of thyroid gland     Hypertension     Osteopenia     last assessed 12/17/13    PONV (postoperative nausea and vomiting)     Syncope and collapse 3/22/2021    Vaginal atrophy          Current Outpatient Medications:     candesartan (ATACAND) 16 mg tablet, Take 0 5 tablets (8 mg total) by mouth daily, Disp: 90 tablet, Rfl: 1    Cholecalciferol (VITAMIN D3) 2000 units CHEW, Chew 2 each daily , Disp: , Rfl:     COD LIVER OIL PO, Take by mouth 1 tsp daily, Disp: , Rfl:     levothyroxine (Synthroid) 75 mcg tablet, Take 1 tablet (75 mcg total) by mouth daily, Disp: 90 tablet, Rfl: 1    mometasone (ELOCON) 0 1 % cream, Apply topically daily as needed (allergies), Disp: 45 g, Rfl: 1    pantoprazole (PROTONIX) 40 mg tablet, Take 1 tablet (40 mg total) by mouth daily, Disp: 90 tablet, Rfl: 2    Current Facility-Administered Medications:     cyanocobalamin injection 1,000 mcg, 1,000 mcg, Intramuscular, Q30 Days, Ernesto Steve MD, 1,000 mcg at 08/16/22 1110    Allergies   Allergen Reactions    Other      Seasonal allergies        Social History   Past Surgical History:   Procedure Laterality Date    COLONOSCOPY      DILATION AND CURETTAGE OF UTERUS      TUBAL LIGATION       Family History   Problem Relation Age of Onset    Lymphoma Mother 79        acute    Hyperlipidemia Father     Peripheral vascular disease Father     Breast cancer Sister     Leukemia Brother     Other Other         4 children living       Objective:  /84 (BP Location: Left arm, Patient Position: Sitting, Cuff Size: Standard)   Pulse 77   Temp 97 5 °F (36 4 °C) (Temporal)   Ht 5' 2 6" (1 59 m) Comment: without shoes  Wt 62 6 kg (138 lb)   SpO2 99%   BMI 24 76 kg/m²     Recent Results (from the past 1344 hour(s))   Comprehensive metabolic panel    Collection Time: 08/12/22  7:47 AM   Result Value Ref Range    Sodium 136 135 - 147 mmol/L    Potassium 4 0 3 5 - 5 3 mmol/L    Chloride 102 96 - 108 mmol/L    CO2 27 21 - 32 mmol/L    ANION GAP 7 4 - 13 mmol/L    BUN 13 5 - 25 mg/dL    Creatinine 0 76 0 60 - 1 30 mg/dL    Glucose, Fasting 84 65 - 99 mg/dL    Calcium 9 1 8 3 - 10 1 mg/dL    AST 29 5 - 45 U/L    ALT 28 12 - 78 U/L    Alkaline Phosphatase 72 46 - 116 U/L    Total Protein 7 3 6 4 - 8 4 g/dL    Albumin 3 7 3 5 - 5 0 g/dL    Total Bilirubin 0 82 0 20 - 1 00 mg/dL    eGFR 74 ml/min/1 73sq m   CBC and differential    Collection Time: 08/12/22  7:47 AM   Result Value Ref Range    WBC 2 83 (L) 4 31 - 10 16 Thousand/uL    RBC 4 21 3 81 - 5 12 Million/uL    Hemoglobin 13 6 11 5 - 15 4 g/dL    Hematocrit 41 7 34 8 - 46 1 %    MCV 99 (H) 82 - 98 fL    MCH 32 3 26 8 - 34 3 pg    MCHC 32 6 31 4 - 37 4 g/dL    RDW 13 3 11 6 - 15 1 %    MPV 10 2 8 9 - 12 7 fL    Platelets 301 389 - 180 Thousands/uL    nRBC 0 /100 WBCs    Neutrophils Relative 44 43 - 75 %    Immat GRANS % 0 0 - 2 %    Lymphocytes Relative 34 14 - 44 %    Monocytes Relative 13 (H) 4 - 12 %    Eosinophils Relative 8 (H) 0 - 6 %    Basophils Relative 1 0 - 1 %    Neutrophils Absolute 1 22 (L) 1 85 - 7 62 Thousands/µL    Immature Grans Absolute 0 01 0 00 - 0 20 Thousand/uL    Lymphocytes Absolute 0 97 0 60 - 4 47 Thousands/µL    Monocytes Absolute 0 36 0 17 - 1 22 Thousand/µL    Eosinophils Absolute 0 23 0 00 - 0 61 Thousand/µL    Basophils Absolute 0 04 0 00 - 0 10 Thousands/µL   Hemoglobin A1C    Collection Time: 08/12/22  7:47 AM   Result Value Ref Range    Hemoglobin A1C 5 6 Normal 3 8-5 6%; PreDiabetic 5 7-6 4%; Diabetic >=6 5%; Glycemic control for adults with diabetes <7 0% %     mg/dl   Lipid Panel with Direct LDL reflex    Collection Time: 08/12/22  7:47 AM   Result Value Ref Range    Cholesterol 248 (H) See Comment mg/dL    Triglycerides 54 See Comment mg/dL    HDL, Direct 116 >=50 mg/dL    LDL Calculated 121 (H) 0 - 100 mg/dL   TSH, 3rd generation with Free T4 reflex    Collection Time: 08/12/22  7:47 AM   Result Value Ref Range    TSH 3RD GENERATON 1 870 0 450 - 4 500 uIU/mL   Vitamin D 25 hydroxy    Collection Time: 08/12/22  7:47 AM   Result Value Ref Range    Vit D, 25-Hydroxy 57 2 30 0 - 100 0 ng/mL   Methylmalonic acid, serum    Collection Time: 08/12/22  7:47 AM   Result Value Ref Range    Methylmalonic Acid, S 142 0 - 378 nmol/L            Physical Exam      Gen: NAD  Face flushed, erythema and fine rash over cheeks and forehead  Heent: atraumatic, normocephalic  Mouth: is moist  Neck: is supple, no JVD, no carotid bruits  Heart: is regular Normal s1, s2,  Lungs: are clear to auscultation  Abd: soft, non tender, NABS  Ext: no edema, distal pulses normal  Skin: no rashes, ulcers  Mood: normal affect  Neuro: AAOx3         I have spent 45 minutes with Patient and daughter today in which greater than 50% of this time was spent in counseling/coordination of care regarding Diagnostic results, Prognosis, Risks and benefits of tx options, Intructions for management, Patient and family education, Importance of tx compliance, Risk factor reductions and Impressions

## 2022-08-16 NOTE — PATIENT INSTRUCTIONS
Patient would like to hold off on the pneumococcal vaccine at this time  1  Hypertension:  Elevated blood pressure during office visit  Blood pressures apparently at home have been within normal   Patient was recommended to bring in the blood pressure cuff for her next visit to correlate with the in office readings  Additionally she was also advised to record her blood pressure when she feels hurried, rushed or anxious  2  Hypercholesterolemia:  Patient has an excellent HDL value however LDL C is greater than 100  Will order coronary artery calcification for risk stratification and to assess the need for a statin  3  Mild leukopenia and low levels of B12 normally supplemented orally  Will give 1000 mcg B12 IM x1 today continue oral supplementation  4  Screening for osteoporosis:  Patient would like to hold off the test at the present time  Will continue to monitor vitamin-D levels presently they are greater than 50 will continue with present supplementation  She is also very diligent with weight-bearing exercise  Medicare Preventive Visit Patient Instructions  Thank you for completing your Welcome to Medicare Visit or Medicare Annual Wellness Visit today  Your next wellness visit will be due in one year (8/17/2023)  The screening/preventive services that you may require over the next 5-10 years are detailed below  Some tests may not apply to you based off risk factors and/or age  Screening tests ordered at today's visit but not completed yet may show as past due  Also, please note that scanned in results may not display below    Preventive Screenings:  Service Recommendations Previous Testing/Comments   Colorectal Cancer Screening  * Colonoscopy    * Fecal Occult Blood Test (FOBT)/Fecal Immunochemical Test (FIT)  * Fecal DNA/Cologuard Test  * Flexible Sigmoidoscopy Age: 39-70 years old   Colonoscopy: every 10 years (may be performed more frequently if at higher risk)  OR  FOBT/FIT: every 1 year OR  Cologuard: every 3 years  OR  Sigmoidoscopy: every 5 years  Screening may be recommended earlier than age 39 if at higher risk for colorectal cancer  Also, an individualized decision between you and your healthcare provider will decide whether screening between the ages of 74-80 would be appropriate  Colonoscopy: 08/30/2021  FOBT/FIT: Not on file  Cologuard: Not on file  Sigmoidoscopy: Not on file    Screening Current     Breast Cancer Screening Age: 36 years old  Frequency: every 1-2 years  Not required if history of left and right mastectomy Mammogram: 01/19/2022    Screening Current   Cervical Cancer Screening Between the ages of 21-29, pap smear recommended once every 3 years  Between the ages of 33-67, can perform pap smear with HPV co-testing every 5 years  Recommendations may differ for women with a history of total hysterectomy, cervical cancer, or abnormal pap smears in past  Pap Smear: Not on file    Screening Not Indicated   Hepatitis C Screening Once for adults born between 1945 and 1965  More frequently in patients at high risk for Hepatitis C Hep C Antibody: 08/19/2021    Screening Current   Diabetes Screening 1-2 times per year if you're at risk for diabetes or have pre-diabetes Fasting glucose: 84 mg/dL (8/12/2022)  A1C: 5 6 % (8/12/2022)  Screening Current   Cholesterol Screening Once every 5 years if you don't have a lipid disorder  May order more often based on risk factors  Lipid panel: 08/12/2022    Screening Not Indicated  History Lipid Disorder     Other Preventive Screenings Covered by Medicare:  Abdominal Aortic Aneurysm (AAA) Screening: covered once if your at risk  You're considered to be at risk if you have a family history of AAA    Lung Cancer Screening: covers low dose CT scan once per year if you meet all of the following conditions: (1) Age 50-69; (2) No signs or symptoms of lung cancer; (3) Current smoker or have quit smoking within the last 15 years; (4) You have a tobacco smoking history of at least 20 pack years (packs per day multiplied by number of years you smoked); (5) You get a written order from a healthcare provider  Glaucoma Screening: covered annually if you're considered high risk: (1) You have diabetes OR (2) Family history of glaucoma OR (3)  aged 48 and older OR (3)  American aged 72 and older  Osteoporosis Screening: covered every 2 years if you meet one of the following conditions: (1) You're estrogen deficient and at risk for osteoporosis based off medical history and other findings; (2) Have a vertebral abnormality; (3) On glucocorticoid therapy for more than 3 months; (4) Have primary hyperparathyroidism; (5) On osteoporosis medications and need to assess response to drug therapy  Last bone density test (DXA Scan): 10/17/2012  HIV Screening: covered annually if you're between the age of 12-76  Also covered annually if you are younger than 13 and older than 72 with risk factors for HIV infection  For pregnant patients, it is covered up to 3 times per pregnancy  Immunizations:  Immunization Recommendations   Influenza Vaccine Annual influenza vaccination during flu season is recommended for all persons aged >= 6 months who do not have contraindications   Pneumococcal Vaccine   * Pneumococcal conjugate vaccine = PCV13 (Prevnar 13), PCV15 (Vaxneuvance), PCV20 (Prevnar 20)  * Pneumococcal polysaccharide vaccine = PPSV23 (Pneumovax) Adults 25-60 years old: 1-3 doses may be recommended based on certain risk factors  Adults 72 years old: 1-2 doses may be recommended based off what pneumonia vaccine you previously received   Hepatitis B Vaccine 3 dose series if at intermediate or high risk (ex: diabetes, end stage renal disease, liver disease)   Tetanus (Td) Vaccine - COST NOT COVERED BY MEDICARE PART B Following completion of primary series, a booster dose should be given every 10 years to maintain immunity against tetanus   Td may also be given as tetanus wound prophylaxis  Tdap Vaccine - COST NOT COVERED BY MEDICARE PART B Recommended at least once for all adults  For pregnant patients, recommended with each pregnancy  Shingles Vaccine (Shingrix) - COST NOT COVERED BY MEDICARE PART B  2 shot series recommended in those aged 48 and above     Health Maintenance Due:      Topic Date Due    DXA SCAN  10/17/2014    Breast Cancer Screening: Mammogram  01/19/2023    Colorectal Cancer Screening  08/30/2026    Hepatitis C Screening  Completed     Immunizations Due:      Topic Date Due    Pneumococcal Vaccine: 65+ Years (1 - PCV) Never done    COVID-19 Vaccine (4 - Booster for Pfizer series) 03/06/2022    Influenza Vaccine (1) 09/01/2022     Advance Directives   What are advance directives? Advance directives are legal documents that state your wishes and plans for medical care  These plans are made ahead of time in case you lose your ability to make decisions for yourself  Advance directives can apply to any medical decision, such as the treatments you want, and if you want to donate organs  What are the types of advance directives? There are many types of advance directives, and each state has rules about how to use them  You may choose a combination of any of the following:  Living will: This is a written record of the treatment you want  You can also choose which treatments you do not want, which to limit, and which to stop at a certain time  This includes surgery, medicine, IV fluid, and tube feedings  Durable power of  for healthcare [facility]): This is a written record that states who you want to make healthcare choices for you when you are unable to make them for yourself  This person, called a proxy, is usually a family member or a friend  You may choose more than 1 proxy  Do not resuscitate (DNR) order:  A DNR order is used in case your heart stops beating or you stop breathing   It is a request not to have certain forms of treatment, such as CPR  A DNR order may be included in other types of advance directives  Medical directive: This covers the care that you want if you are in a coma, near death, or unable to make decisions for yourself  You can list the treatments you want for each condition  Treatment may include pain medicine, surgery, blood transfusions, dialysis, IV or tube feedings, and a ventilator (breathing machine)  Values history: This document has questions about your views, beliefs, and how you feel and think about life  This information can help others choose the care that you would choose  Why are advance directives important? An advance directive helps you control your care  Although spoken wishes may be used, it is better to have your wishes written down  Spoken wishes can be misunderstood, or not followed  Treatments may be given even if you do not want them  An advance directive may make it easier for your family to make difficult choices about your care  © Copyright [employer] 2018 Information is for End User's use only and may not be sold, redistributed or otherwise used for commercial purposes   All illustrations and images included in CareNotes® are the copyrighted property of A D A M , Inc  or [facility] [employer]

## 2022-08-17 PROBLEM — R55 SYNCOPE AND COLLAPSE: Status: RESOLVED | Noted: 2021-03-22 | Resolved: 2022-08-17

## 2022-08-17 PROBLEM — N17.9 AKI (ACUTE KIDNEY INJURY) (HCC): Status: RESOLVED | Noted: 2021-03-22 | Resolved: 2022-08-17

## 2022-08-17 PROBLEM — E86.0 DEHYDRATION AFTER EXERTION: Status: RESOLVED | Noted: 2021-03-25 | Resolved: 2022-08-17

## 2022-08-17 NOTE — ASSESSMENT & PLAN NOTE
Pt is hesitant to restart the cholesterol medication due to side effects  Agreeable to coronary calcium screening

## 2022-08-17 NOTE — ASSESSMENT & PLAN NOTE
BP reading elevated at office visit  Recommend restart Candesartan 8 mg daily  Monitor BP    Bring in cuff at next visit to co-relate readings

## 2022-09-15 ENCOUNTER — OFFICE VISIT (OUTPATIENT)
Dept: OBGYN CLINIC | Facility: CLINIC | Age: 77
End: 2022-09-15
Payer: MEDICARE

## 2022-09-15 VITALS
DIASTOLIC BLOOD PRESSURE: 88 MMHG | WEIGHT: 137.6 LBS | HEIGHT: 62 IN | SYSTOLIC BLOOD PRESSURE: 160 MMHG | BODY MASS INDEX: 25.32 KG/M2

## 2022-09-15 DIAGNOSIS — Z12.31 ENCOUNTER FOR SCREENING MAMMOGRAM FOR MALIGNANT NEOPLASM OF BREAST: ICD-10-CM

## 2022-09-15 DIAGNOSIS — Z01.419 ENCOUNTER FOR WELL WOMAN EXAM WITH ROUTINE GYNECOLOGICAL EXAM: Primary | ICD-10-CM

## 2022-09-15 PROCEDURE — G0101 CA SCREEN;PELVIC/BREAST EXAM: HCPCS | Performed by: OBSTETRICS & GYNECOLOGY

## 2022-09-15 NOTE — PROGRESS NOTES
ASSESSMENT & PLAN:   Diagnoses and all orders for this visit:    Encounter for well woman exam with routine gynecological exam    Encounter for screening mammogram for malignant neoplasm of breast  -     Mammo screening bilateral w 3d & cad; Future      Vaginal atrophy -- discussed that resuming her Vagifem tablets are not necessary as she is asymptomatic and not sexually active  Provided precautions for when to return  The following were reviewed in today's visit: ASCCP guidelines (Pap screen not indicated after age 72), STD testing breast self exam, mammography screening ordered, menopause, adequate intake of calcium and vitamin D, exercise, healthy diet, DEXA ordered by PCP and colonoscopy reviewed  Patient to return to office in every other year for annual exam if desired  Can return for prn gyn problem or concern  All questions have been answered to her satisfaction  CC:  Annual Gynecologic Examination  Chief Complaint   Patient presents with    Gynecologic Exam     mammo 20  dexa ordered 20  colonoscopy 21 repeat 5 years        HPI: Yana Farrell is a 76 y o   who presents for annual gynecologic examination  She has the following concerns:    - new patient  Establish gyn care  Rose Marie Buenrostro has no gyn concerns at this time  She was previously seen by Dr Lashae Gomez for her OBGYN care and he delivered all of her children and then her care was taken over by Dr Ras Gregory  She denies any abnormal pap smears in the past and stopped after the age of 70  She was using Vagifem tablets for her vaginal atrophy which was helping for continued sexual activity when her  was active and healthy  Unfortunately he passed in  at the beginning of the pandemic from pancreatic cancer and progressive parkinson's disease  Since his passing she is not sexually active and denies any symptoms of vaginal dryness that are troublesome   She denies external irritation and dryness day to day and reports that overall she feels well  She is no longer using Vagifem tablets for vaginal estrogen therapy  Jason Marshall reports that her brother and her sister recently passed away which has been hard, though she is keeping healthy and active  Her grandchildren are all adult aged and going to college  Health Maintenance:    Exercise: daily  Breast exams/breast awareness: yes  Diet: well balanced diet  Last mammogram: 2022 - BIRADS 1  Colorectal cancer screenin2021 - repeat in 5 years  DEXA: ordered 22 - scheduled for September or October    Past Medical History:   Diagnosis Date    LYLE (acute kidney injury) ([facility] [address] ) 3/22/2021    Dehydration after exertion 3/25/2021    Disease of thyroid gland     Hypertension     Osteopenia     last assessed 13    PONV (postoperative nausea and vomiting)     Syncope and collapse 3/22/2021    Vaginal atrophy        Past Surgical History:   Procedure Laterality Date    COLONOSCOPY      DILATION AND CURETTAGE OF UTERUS      TUBAL LIGATION         Past OB/Gyn History:   No LMP recorded  Patient is postmenopausal     Patient is menopausal    Menopausal symptoms: None  Last Pap: further pap screening not indicated  History of abnormal Pap smear: no    Patient is not currently sexually active  Family History  Family History   Problem Relation Age of Onset    Lymphoma Mother 79        acute    Hyperlipidemia Father     Peripheral vascular disease Father     Breast cancer Sister     Leukemia Brother     Other Other         4 children living       Family history of uterine or ovarian cancer: no  Family history of breast cancer: yes  Family history of colon cancer: no    Social History:  Social History     Socioeconomic History    Marital status:       Spouse name: Not on file    Number of children: Not on file    Years of education: Not on file    Highest education level: Not on file   Occupational History    Occupation:    Tobacco Use    Smoking status: Former Smoker     Packs/day: 0 25     Years: 8 00     Pack years: 2 00     Types: Cigarettes     Quit date: 1970     Years since quittin 7    Smokeless tobacco: Never Used    Tobacco comment: about 39 years ago    Vaping Use    Vaping Use: Never used   Substance and Sexual Activity    Alcohol use: Not Currently     Comment: rare occasion    Drug use: No    Sexual activity: Not Currently   Other Topics Concern    Not on file   Social History Narrative    Travel    She enjoys cooking    Denied hx of exercise habits     Social Determinants of Health     Financial Resource Strain: Not on file   Food Insecurity: Not on file   Transportation Needs: Not on file   Physical Activity: Not on file   Stress: Not on file   Social Connections: Not on file   Intimate Partner Violence: Not on file   Housing Stability: Not on file     Domestic violence screen: negative    Allergies:   Allergies   Allergen Reactions    Other      Seasonal allergies        Medications:    Current Outpatient Medications:     Cholecalciferol (VITAMIN D3) 2000 units CHEW, Chew 2 each daily , Disp: , Rfl:     COD LIVER OIL PO, Take by mouth 1 tsp daily, Disp: , Rfl:     levothyroxine (Synthroid) 75 mcg tablet, Take 1 tablet (75 mcg total) by mouth daily, Disp: 90 tablet, Rfl: 1    mometasone (ELOCON) 0 1 % cream, Apply topically daily as needed (allergies), Disp: 45 g, Rfl: 1    pantoprazole (PROTONIX) 40 mg tablet, Take 1 tablet (40 mg total) by mouth daily, Disp: 90 tablet, Rfl: 2    candesartan (ATACAND) 16 mg tablet, Take 0 5 tablets (8 mg total) by mouth daily (Patient not taking: No sig reported), Disp: 90 tablet, Rfl: 1    Current Facility-Administered Medications:     cyanocobalamin injection 1,000 mcg, 1,000 mcg, Intramuscular, Q30 Days, Carole Lim MD, 1,000 mcg at 22 1110    Review of Systems:  Review of Systems   Constitutional: Negative for activity change, appetite change and unexpected weight change  Respiratory: Negative for cough and shortness of breath  Cardiovascular: Negative for chest pain  Gastrointestinal: Negative for abdominal pain, constipation, diarrhea, nausea and vomiting  Genitourinary: Negative for difficulty urinating, frequency, menstrual problem, pelvic pain, urgency, vaginal bleeding, vaginal discharge and vaginal pain  Musculoskeletal: Negative for back pain  Skin: Negative  Neurological: Negative for dizziness, weakness, light-headedness and headaches  Psychiatric/Behavioral: Negative  Physical Exam:  /88 (BP Location: Right arm, Patient Position: Sitting, Cuff Size: Standard)   Ht 5' 2" (1 575 m)   Wt 62 4 kg (137 lb 9 6 oz)   BMI 25 17 kg/m²    Physical Exam  Constitutional:       General: She is not in acute distress  Appearance: Normal appearance  She is well-developed and normal weight  She is not diaphoretic  Genitourinary:      Vulva and bladder normal       No lesions in the vagina  Genitourinary Comments: Perineum normal in appearance, no lacerations, no ulcerations, no lesions visualized  Right Labia: No rash, tenderness or lesions  Left Labia: No tenderness, lesions or rash  No inguinal adenopathy present in the right or left side  No vaginal discharge, erythema, tenderness or bleeding  No vaginal prolapse present  Severe vaginal atrophy present  Right Adnexa: not tender, not full and no mass present  Left Adnexa: not tender, not full and no mass present  Cervix is nulliparous  No cervical motion tenderness, discharge, friability, lesion or polyp  No parametrium nodularity or thickening present  Uterus is not enlarged, tender or prolapsed  No uterine mass detected  Uterus is midaxial       No urethral prolapse or mass present  Bladder is not tender  Pelvic exam was performed with patient in the lithotomy position     Rectum:      No tenderness or external hemorrhoid  Breasts: Breasts are symmetrical       Right: No swelling, bleeding, mass, skin change or tenderness  Left: No swelling, bleeding, mass, skin change or tenderness  HENT:      Head: Normocephalic and atraumatic  Neck:      Thyroid: No thyromegaly or thyroid tenderness  Cardiovascular:      Rate and Rhythm: Normal rate and regular rhythm  Heart sounds: Normal heart sounds  No murmur heard  No friction rub  Pulmonary:      Effort: Pulmonary effort is normal  No respiratory distress  Breath sounds: Normal breath sounds  No wheezing or rales  Abdominal:      Palpations: Abdomen is soft  There is no mass  Tenderness: There is no abdominal tenderness  There is no guarding  Musculoskeletal:         General: No tenderness  Normal range of motion  Right lower leg: No edema  Left lower leg: No edema  Lymphadenopathy:      Lower Body: No right inguinal adenopathy  No left inguinal adenopathy  Neurological:      Mental Status: She is alert and oriented to person, place, and time  Skin:     General: Skin is warm and dry  Coloration: Skin is not pale  Findings: No erythema  Psychiatric:         Mood and Affect: Mood normal          Behavior: Behavior normal          Thought Content: Thought content normal          Judgment: Judgment normal    Vitals and nursing note reviewed

## 2022-09-26 DIAGNOSIS — E01.8 IODINE HYPOTHYROIDISM: ICD-10-CM

## 2022-09-26 RX ORDER — LEVOTHYROXINE SODIUM 0.07 MG/1
75 TABLET ORAL DAILY
Qty: 90 TABLET | Refills: 1 | Status: SHIPPED | OUTPATIENT
Start: 2022-09-26

## 2022-09-27 ENCOUNTER — HOSPITAL ENCOUNTER (OUTPATIENT)
Dept: CT IMAGING | Facility: HOSPITAL | Age: 77
Discharge: HOME | End: 2022-09-27
Attending: INTERNAL MEDICINE
Payer: COMMERCIAL

## 2022-09-27 DIAGNOSIS — E78.00 HYPERCHOLESTEROLEMIA: ICD-10-CM

## 2022-09-27 PROCEDURE — G1004 CDSM NDSC: HCPCS

## 2022-09-27 PROCEDURE — 75571 CT HRT W/O DYE W/CA TEST: CPT

## 2022-09-30 NOTE — RESULT ENCOUNTER NOTE
Reviewed results of Hiatal Hernia with daughter, Maira Almeida    Will discuss further with Dr Krystyna Cortez re future plan given the extent of the [facility] and whether a referral to Dr Zak Haro for repair would be ideal

## 2022-10-06 ENCOUNTER — PATIENT MESSAGE (OUTPATIENT)
Dept: INTERNAL MEDICINE CLINIC | Facility: CLINIC | Age: 77
End: 2022-10-06

## 2022-10-06 NOTE — TELEPHONE ENCOUNTER
Kevin Valencia [place] 10/6/2022 12:10 PM EDT      ----- Message -----  From: Vianney Benjamin  Sent: 10/6/2022 11:03 AM EDT  To: [address] [facility]  Subject: Hiatal Hernia follow up     Hi Dr Antionette Morales,  I saw Dr Anne Butcher response about my mom's hiatal hernia  So at this point are your thoughts that we should just do a follow up appointment with him in 2-3 months (so December or January), and then follow up with Dr Ezio Greenberg if he feels we should? Or do you think she should see Dr Ezio Greenberg sooner? Thanks so much      Sakina Hernandez

## 2022-10-06 NOTE — PATIENT COMMUNICATION
I think it may be worthwhile seeing dr Eleni Aly given the size of the Hiatal Hernia    Are you able to eat a full meal?

## 2022-10-07 ENCOUNTER — PATIENT MESSAGE (OUTPATIENT)
Dept: INTERNAL MEDICINE CLINIC | Facility: CLINIC | Age: 77
End: 2022-10-07

## 2022-10-07 NOTE — TELEPHONE ENCOUNTER
Dacia Johnson 10/7/2022 11:55 AM EDT      ----- Message -----  From: Vianney Benjamin  Sent: 10/7/2022 11:38 AM EDT  To: [address] [facility] [place] [facility]  Subject: Hiatal Hernia follow up     This is Itzel Manriquez daughter, Sakina Hernandez, replying to Dr Amie Fuentes question in the updated visit summary for the CT Calcium Score test  My mom is able to eat full meals, but she does eat smaller portions I think  For example, she may eat about 4 oz of fish, a salad, and a small sweet potato or another vegetable for dinner  If you think she should see Dr Ezio Greenberg we can do that and see what he says  Let me know, and also it may be good for you to call my mom and talk to her  As of now (and this is no surprise), she says she feels fine  Thanks so much      Sakina Hernandez

## 2023-01-07 DIAGNOSIS — K44.9 HIATAL HERNIA: ICD-10-CM

## 2023-01-07 RX ORDER — PANTOPRAZOLE SODIUM 40 MG/1
TABLET, DELAYED RELEASE ORAL
Qty: 90 TABLET | Refills: 2 | Status: SHIPPED | OUTPATIENT
Start: 2023-01-07

## 2023-02-01 ENCOUNTER — OFFICE VISIT (OUTPATIENT)
Dept: GASTROENTEROLOGY | Facility: AMBULARY SURGERY CENTER | Age: 78
End: 2023-02-01

## 2023-02-01 VITALS
HEIGHT: 62 IN | HEART RATE: 72 BPM | DIASTOLIC BLOOD PRESSURE: 74 MMHG | WEIGHT: 140.6 LBS | BODY MASS INDEX: 25.88 KG/M2 | SYSTOLIC BLOOD PRESSURE: 150 MMHG | OXYGEN SATURATION: 98 %

## 2023-02-01 DIAGNOSIS — D50.0 IRON DEFICIENCY ANEMIA DUE TO CHRONIC BLOOD LOSS: ICD-10-CM

## 2023-02-01 DIAGNOSIS — K44.9 HIATAL HERNIA: Primary | ICD-10-CM

## 2023-02-01 RX ORDER — PANTOPRAZOLE SODIUM 40 MG/1
40 TABLET, DELAYED RELEASE ORAL DAILY
Qty: 90 TABLET | Refills: 3 | Status: SHIPPED | OUTPATIENT
Start: 2023-02-01

## 2023-02-01 NOTE — LETTER
February 1, 2023     Pavel White, [address] [facility] 215 [address]    Patient: Dagoberto Riggs   YOB: 1945   Date of Visit: 2/1/2023       Dear Dr Wm Mayberry: Thank you for referring Mari Cagle to me for evaluation  Below are my notes for this consultation  If you have questions, please do not hesitate to call me  I look forward to following your patient along with you  Sincerely,        Shira Osborn MD        CC: No Recipients  Shira Osborn MD  2/1/2023  1:09 PM  Sign when Signing Visit  [address] [facility]  Dagoberto Riggs 76 y o  female MRN: [id number]            Assessment & Plan:  Pleasant 68-year-old female here for follow-up hiatal hernia with associated reflux, dysphagia and anemia  1   Hiatal hernia: Moderate size hiatal hernia approximately 5 cm on EGD, most of her fundus is in her chest according to recent CT scan  -We will check an upper GI series  -Discussed with the patient that she is clinically doing very well and asymptomatic, would not recommend any aggressive treatments at this time however given her age, she should consider at least discussion with surgery to see if she would benefit from surgical correction especially given new minimally invasive surgeries  -Discussed with the patient there is a very small chance of complication such as volvulus in the future which would require more urgent surgery and associated complications  -At this point time we can start with the upper GI series, if there is any concerning findings on that we will refer to thoracic surgery for further discussion    2  GERD and dysphagia: Secondary to hiatal hernia  Patient much better after last endoscopy and dilation  -Continue daily PPI therapy  3   Iron deficiency anemia: Suspected due to underlying Nick's ulcers and hiatal hernia  -Much improved  -Continue PPI therapy  -We will check CBC and iron panel      Charlene Kraft was seen today for follow-up      Diagnoses and all orders for this visit:    Hiatal hernia  -     FL upper GI UGI; Future  -     pantoprazole (PROTONIX) 40 mg tablet; Take 1 tablet (40 mg total) by mouth daily  -     Basic metabolic panel; Future  -     CBC and differential; Future  -     Iron Saturation %; Future  -     Ferritin; Future    Iron deficiency anemia due to chronic blood loss  -     Iron Saturation %; Future  -     Ferritin; Future            _____________________________________________________________        CC: Follow-up    HPI:  Vanessa Gould is a 76 y  o female who is here for aloe up  78-year-old female, here for follow-up of hiatal hernia with associated ring, dysphagia and reflux  Patient reports that she has been doing very well  She tried to cut back on pantoprazole however this caused increasing symptoms, currently taking PPI therapy once daily  Denies any nausea, vomiting, dysphagia  Appetite has been fair  She is been try to eat softer foods and has smaller frequent meals throughout the day and has done very well with that without any recurrent symptoms  Patient presents today, with her daughter  Had a recent coronary calcium CT scan which demonstrated significant hiatal hernia      ROS:  The remainder of the ROS was negative except for the pertinent positives mentioned in HPI  Allergies:  Other    Medications:   Current Outpatient Medications:   •  Cholecalciferol (VITAMIN D3) 2000 units CHEW, Chew 2 each daily , Disp: , Rfl:   •  COD LIVER OIL PO, Take by mouth 1 tsp daily, Disp: , Rfl:   •  levothyroxine (Synthroid) 75 mcg tablet, Take 1 tablet (75 mcg total) by mouth daily, Disp: 90 tablet, Rfl: 1  •  mometasone (ELOCON) 0 1 % cream, Apply topically daily as needed (allergies), Disp: 45 g, Rfl: 1  •  pantoprazole (PROTONIX) 40 mg tablet, Take 1 tablet (40 mg total) by mouth daily, Disp: 90 tablet, Rfl: 3  •  candesartan (ATACAND) 16 mg tablet, Take 0 5 tablets (8 mg total) by mouth daily (Patient not taking: No sig reported), Disp: 90 tablet, Rfl: 1    Current Facility-Administered Medications:   •  cyanocobalamin injection 1,000 mcg, 1,000 mcg, Intramuscular, Q30 Days, Flor Gay MD, 1,000 mcg at 08/16/22 1110    Past Medical History:   Diagnosis Date   • LYLE (acute kidney injury) ([facility] [address] ) 3/22/2021   • Dehydration after exertion 3/25/2021   • Disease of thyroid gland    • Hypertension    • Osteopenia     last assessed 12/17/13   • PONV (postoperative nausea and vomiting)    • Syncope and collapse 3/22/2021   • Vaginal atrophy        Past Surgical History:   Procedure Laterality Date   • COLONOSCOPY     • DILATION AND CURETTAGE OF UTERUS     • TUBAL LIGATION         Family History   Problem Relation Age of Onset   • Lymphoma Mother 79        acute   • Hyperlipidemia Father    • Peripheral vascular disease Father    • Breast cancer Sister    • Leukemia Brother    • Other Other         4 children living        reports that she quit smoking about 53 years ago  Her smoking use included cigarettes  She has a 2 00 pack-year smoking history  She has never used smokeless tobacco  She reports that she does not currently use alcohol  She reports that she does not use drugs        Physical Exam:    /74 (BP Location: Right arm, Patient Position: Sitting, Cuff Size: Standard)   Pulse 72   Ht 5' 2" (1 575 m)   Wt 63 8 kg (140 lb 9 6 oz)   SpO2 98%   BMI 25 72 kg/m²     Gen: wn/wd, NAD pleasant elderly female  HEENT: anicteric, MMM, no cervical LAD  CVS: RRR, no m/r/g  CHEST: CTA b/l  ABD: +BS, soft, NT,ND, no hepatosplenomegaly  EXT: no c/c/e  NEURO: aaox3  SKIN: NO rashes

## 2023-02-01 NOTE — PROGRESS NOTES
[address] [facility]  Juanpablo Hebert 76 y o  female MRN: [id number]            Assessment & Plan:  Pleasant 77-year-old female here for follow-up hiatal hernia with associated reflux, dysphagia and anemia  1   Hiatal hernia: Moderate size hiatal hernia approximately 5 cm on EGD, most of her fundus is in her chest according to recent CT scan  -We will check an upper GI series  -Discussed with the patient that she is clinically doing very well and asymptomatic, would not recommend any aggressive treatments at this time however given her age, she should consider at least discussion with surgery to see if she would benefit from surgical correction especially given new minimally invasive surgeries  -Discussed with the patient there is a very small chance of complication such as volvulus in the future which would require more urgent surgery and associated complications  -At this point time we can start with the upper GI series, if there is any concerning findings on that we will refer to thoracic surgery for further discussion    2  GERD and dysphagia: Secondary to hiatal hernia  Patient much better after last endoscopy and dilation  -Continue daily PPI therapy  3   Iron deficiency anemia: Suspected due to underlying Nick's ulcers and hiatal hernia  -Much improved  -Continue PPI therapy  -We will check CBC and iron panel      Whitney Pino was seen today for follow-up  Diagnoses and all orders for this visit:    Hiatal hernia  -     FL upper GI UGI; Future  -     pantoprazole (PROTONIX) 40 mg tablet; Take 1 tablet (40 mg total) by mouth daily  -     Basic metabolic panel; Future  -     CBC and differential; Future  -     Iron Saturation %; Future  -     Ferritin; Future    Iron deficiency anemia due to chronic blood loss  -     Iron Saturation %; Future  -     Ferritin; Future            _____________________________________________________________        CC: Follow-up    HPI:  Juanpablo Hebert is a 76 y  o female who is here for aloe up  33-year-old female, here for follow-up of hiatal hernia with associated ring, dysphagia and reflux  Patient reports that she has been doing very well  She tried to cut back on pantoprazole however this caused increasing symptoms, currently taking PPI therapy once daily  Denies any nausea, vomiting, dysphagia  Appetite has been fair  She is been try to eat softer foods and has smaller frequent meals throughout the day and has done very well with that without any recurrent symptoms  Patient presents today, with her daughter  Had a recent coronary calcium CT scan which demonstrated significant hiatal hernia      ROS:  The remainder of the ROS was negative except for the pertinent positives mentioned in HPI  Allergies:  Other    Medications:   Current Outpatient Medications:   •  Cholecalciferol (VITAMIN D3) 2000 units CHEW, Chew 2 each daily , Disp: , Rfl:   •  COD LIVER OIL PO, Take by mouth 1 tsp daily, Disp: , Rfl:   •  levothyroxine (Synthroid) 75 mcg tablet, Take 1 tablet (75 mcg total) by mouth daily, Disp: 90 tablet, Rfl: 1  •  mometasone (ELOCON) 0 1 % cream, Apply topically daily as needed (allergies), Disp: 45 g, Rfl: 1  •  pantoprazole (PROTONIX) 40 mg tablet, Take 1 tablet (40 mg total) by mouth daily, Disp: 90 tablet, Rfl: 3  •  candesartan (ATACAND) 16 mg tablet, Take 0 5 tablets (8 mg total) by mouth daily (Patient not taking: No sig reported), Disp: 90 tablet, Rfl: 1    Current Facility-Administered Medications:   •  cyanocobalamin injection 1,000 mcg, 1,000 mcg, Intramuscular, Q30 Days, Flor Gay MD, 1,000 mcg at 08/16/22 1110    Past Medical History:   Diagnosis Date   • LYLE (acute kidney injury) ([facility] [address] ) 3/22/2021   • Dehydration after exertion 3/25/2021   • Disease of thyroid gland    • Hypertension    • Osteopenia     last assessed 12/17/13   • PONV (postoperative nausea and vomiting)    • Syncope and collapse 3/22/2021   • Vaginal atrophy        Past Surgical History:   Procedure Laterality Date   • COLONOSCOPY     • DILATION AND CURETTAGE OF UTERUS     • TUBAL LIGATION         Family History   Problem Relation Age of Onset   • Lymphoma Mother 79        acute   • Hyperlipidemia Father    • Peripheral vascular disease Father    • Breast cancer Sister    • Leukemia Brother    • Other Other         4 children living        reports that she quit smoking about 53 years ago  Her smoking use included cigarettes  She has a 2 00 pack-year smoking history  She has never used smokeless tobacco  She reports that she does not currently use alcohol  She reports that she does not use drugs        Physical Exam:    /74 (BP Location: Right arm, Patient Position: Sitting, Cuff Size: Standard)   Pulse 72   Ht 5' 2" (1 575 m)   Wt 63 8 kg (140 lb 9 6 oz)   SpO2 98%   BMI 25 72 kg/m²     Gen: wn/wd, NAD pleasant elderly female  HEENT: anicteric, MMM, no cervical LAD  CVS: RRR, no m/r/g  CHEST: CTA b/l  ABD: +BS, soft, NT,ND, no hepatosplenomegaly  EXT: no c/c/e  NEURO: aaox3  SKIN: NO rashes

## 2023-02-21 ENCOUNTER — RA CDI HCC (OUTPATIENT)
Dept: OTHER | Facility: HOSPITAL | Age: 78
End: 2023-02-21

## 2023-02-27 ENCOUNTER — TELEPHONE (OUTPATIENT)
Dept: INTERNAL MEDICINE CLINIC | Facility: CLINIC | Age: 78
End: 2023-02-27

## 2023-02-27 NOTE — TELEPHONE ENCOUNTER
Patient's daughter, Royal Puente, called this morning stating that she tested positive for COVID on 2/21  Has been taking medication but does not feel safe being around mother  Wanted to see if PCP wanted to keep appointment for tomorrow and do a virtual follow up, or if patient should reschedule for another date  Provider booking out until June as of right now       Informed patient's daughter that Dr Julio Simms is out of office but will ask when Provider comes in AM

## 2023-02-27 NOTE — TELEPHONE ENCOUNTER
Thank you Mert for the detailed message and follow through    Yes we can switch her appointment to " virtual tomorrow

## 2023-02-28 ENCOUNTER — TELEMEDICINE (OUTPATIENT)
Dept: INTERNAL MEDICINE CLINIC | Facility: CLINIC | Age: 78
End: 2023-02-28

## 2023-02-28 VITALS
HEIGHT: 62 IN | BODY MASS INDEX: 25.58 KG/M2 | WEIGHT: 139 LBS | TEMPERATURE: 98 F | SYSTOLIC BLOOD PRESSURE: 120 MMHG | OXYGEN SATURATION: 99 % | DIASTOLIC BLOOD PRESSURE: 72 MMHG | HEART RATE: 89 BPM

## 2023-02-28 DIAGNOSIS — E55.9 VITAMIN D DEFICIENCY: ICD-10-CM

## 2023-02-28 DIAGNOSIS — E53.8 VITAMIN B12 DEFICIENCY: ICD-10-CM

## 2023-02-28 DIAGNOSIS — I10 HYPERTENSION, ESSENTIAL: Primary | ICD-10-CM

## 2023-02-28 DIAGNOSIS — K44.9 HIATAL HERNIA: ICD-10-CM

## 2023-02-28 DIAGNOSIS — E78.00 HYPERCHOLESTEROLEMIA: ICD-10-CM

## 2023-02-28 DIAGNOSIS — E01.8 IODINE HYPOTHYROIDISM: ICD-10-CM

## 2023-02-28 RX ORDER — LEVOTHYROXINE SODIUM 0.07 MG/1
75 TABLET ORAL DAILY
Qty: 90 TABLET | Refills: 1 | Status: SHIPPED | OUTPATIENT
Start: 2023-02-28

## 2023-02-28 RX ORDER — CANDESARTAN 4 MG/1
4 TABLET ORAL DAILY
Qty: 90 TABLET | Refills: 2 | Status: SHIPPED | OUTPATIENT
Start: 2023-02-28

## 2023-02-28 NOTE — PROGRESS NOTES
Virtual Brief Visit    Patient is located in the following state in which I hold an active license [place]      Assessment/Plan:    Problem List Items Addressed This Visit        Cardiovascular and Mediastinum    Hypertension, essential - Primary     Will restart candesartan however at a low dosage of 4 mg a day  Can be increased as tolerated  Relevant Medications    candesartan (ATACAND) 4 mg tablet       Other    Hypercholesterolemia     Patient unable to tolerate statin as prescribed previously  She has underwent CT coronary screening to evaluate need for alternate therapy  Given that her calcium score was very low there was no reason to switch to alternate therapy at this time  Vitamin B12 deficiency    Relevant Orders    Methylmalonic acid, serum    Vitamin D deficiency    Hiatal hernia   Other Visit Diagnoses     Iodine hypothyroidism        Relevant Medications    levothyroxine (Synthroid) 75 mcg tablet    Other Relevant Orders    TSH, 3rd generation with Free T4 reflex          Chief Complaint   Patient presents with   • Virtual Regular Visit     6 month follow up   • Health Screening     Dexa scan pt would not like to have done depression screen urinary incont fall risk awv due in Aug   • Virtual Brief Visit       77-year-old female here today for follow-up  She follows up with Dr Srinivasan Lee, gastroenterology for iron deficiency anemia thought to be secondary to 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 and hiatal hernia  She  has significant hiatal hernia with associated ring, dysphagia and reflux  She is on PPI therapy once daily and symptoms are under fair control as long as she eats several small meals throughout the day  She also has a history of hypertension for which she was previously on candesartan 16 mg a day however she had severe side effects on it and she stopped the medication    She was placed on a statin medication for elevated cholesterol however due to side effects she stopped the medication she underwent CT scanning for coronary calcium scoring which showed a pretty low score and she has since stopped the medication    Review of Systems   HENT: Positive for postnasal drip  Gastrointestinal:        GERD symptoms, early satiety   Skin:        Flushes easily   All other systems reviewed and are negative  Recent Visits  Date Type Provider Dept   02/27/23 Telephone Krishna Smith [facility]   Showing recent visits within past 7 days and meeting all other requirements  Today's Visits  Date Type Provider Dept   02/28/23 Telemedicine Kirk Garay, [address] today's visits and meeting all other requirements  Future Appointments  No visits were found meeting these conditions  Showing future appointments within next 150 days and meeting all other requirements         I have spent 25 minutes with Patient and her daughter today in which greater than 50% of this time was spent in chart review, counseling/coordination of care regarding Diagnostic results, Prognosis, Risks and benefits of tx options, Intructions for management, Patient and family education, Importance of tx compliance, Risk factor reductions and Impressions

## 2023-02-28 NOTE — ASSESSMENT & PLAN NOTE
Patient unable to tolerate statin as prescribed previously  She has underwent CT coronary screening to evaluate need for alternate therapy  Given that her calcium score was very low there was no reason to switch to alternate therapy at this time

## 2023-02-28 NOTE — ASSESSMENT & PLAN NOTE
Continued follow-up with gastroenterology  CT scan done for coronary calcium screening showed a significant hiatal hernia    She is to follow-up with Dr Asmita Pedraza for evaluation for consideration for surgical correction

## 2023-05-05 ENCOUNTER — HOSPITAL ENCOUNTER (OUTPATIENT)
Dept: RADIOLOGY | Facility: HOSPITAL | Age: 78
Discharge: HOME | End: 2023-05-05
Attending: INTERNAL MEDICINE

## 2023-05-05 DIAGNOSIS — K44.9 HIATAL HERNIA: ICD-10-CM

## 2023-06-12 ENCOUNTER — TELEPHONE (OUTPATIENT)
Dept: INTERNAL MEDICINE CLINIC | Facility: CLINIC | Age: 78
End: 2023-06-12

## 2023-06-12 ENCOUNTER — TELEPHONE (OUTPATIENT)
Dept: GASTROENTEROLOGY | Facility: CLINIC | Age: 78
End: 2023-06-12

## 2023-06-12 DIAGNOSIS — R55 NEAR SYNCOPE: Primary | ICD-10-CM

## 2023-06-12 NOTE — TELEPHONE ENCOUNTER
Pt's daughter, Jeanie Martin, called to say pt was in [facility] ER on 06/10/2023, they recommended cardio  They would prefer to go to a [facility]  Is there anyone you would recommend? Daughter's brother in law recommended Dr Bandar Campos  Do you think he would be good for her?

## 2023-06-12 NOTE — TELEPHONE ENCOUNTER
Spoke with patients daughter, she would like a referral placed for surgeon to evaluate hiatal hernia  Please order referral for patient and she will call to schedule   Thank you

## 2023-06-12 NOTE — TELEPHONE ENCOUNTER
Patients GI provider:  Dr Mali Ryan    Number to return call: [phone] (    Reason for call: Pt's daughter Emiliano Chatterjee contacted office stating patient went to ED this past weekend due to passing out  They are concerned it may have to do with hiatial hernia  Sooner appointment was requested to discuss proceeding with surgery, but no sooner availability  Please contact patients daughter Emiliano Chatterjee (alternate  listed in chart, not updated on communication consent) [phone] to discuss      Scheduled procedure/appointment date if applicable: 4/36/9289 dr Mali Ryan

## 2023-06-13 ENCOUNTER — OFFICE VISIT (OUTPATIENT)
Dept: INTERNAL MEDICINE CLINIC | Facility: CLINIC | Age: 78
End: 2023-06-13
Payer: MEDICARE

## 2023-06-13 VITALS
BODY MASS INDEX: 24.45 KG/M2 | HEIGHT: 64 IN | TEMPERATURE: 97.5 F | SYSTOLIC BLOOD PRESSURE: 164 MMHG | HEART RATE: 82 BPM | WEIGHT: 143.2 LBS | DIASTOLIC BLOOD PRESSURE: 82 MMHG | OXYGEN SATURATION: 98 %

## 2023-06-13 DIAGNOSIS — R73.9 HYPERGLYCEMIA: ICD-10-CM

## 2023-06-13 DIAGNOSIS — I10 HYPERTENSION, ESSENTIAL: ICD-10-CM

## 2023-06-13 DIAGNOSIS — K21.00 GASTROESOPHAGEAL REFLUX DISEASE WITH ESOPHAGITIS WITHOUT HEMORRHAGE: ICD-10-CM

## 2023-06-13 DIAGNOSIS — K44.9 HIATAL HERNIA: ICD-10-CM

## 2023-06-13 DIAGNOSIS — R55 VASOVAGAL NEAR-SYNCOPE: Primary | ICD-10-CM

## 2023-06-13 PROCEDURE — 99214 OFFICE O/P EST MOD 30 MIN: CPT | Performed by: INTERNAL MEDICINE

## 2023-06-13 NOTE — ASSESSMENT & PLAN NOTE
Continue to hold medication at this time  Patient was instructed to continue to monitor blood pressure at home she knows when to restart the medication as necessary if blood pressure readings are high

## 2023-06-13 NOTE — PROGRESS NOTES
Name: Darvin Ruiz      : 1945      MRN: [id number]  Encounter Provider: Cj Boyle MD  Encounter Date: 2023   Encounter department: [address] [place] [address]     1  Vasovagal near-syncope  Assessment & Plan:  Patient had a vasovagal episode on Mitra 10  This was associated with dietary indiscretion on her part  Previous history of hiatal hernia with intolerance to large meals  She admits to eating more than what she normally does even though the amount she ate was not very high  This was followed with an episode of flushing nausea feeling of lightheadedness and attempt to throw up  she also felt clammy  and had near syncope with her eyes rolling up  2  Hiatal hernia  Assessment & Plan:  Long discussion with patient regarding options for treatment of hiatal hernia  Recommend follow-up for evaluation and possible laparoscopic reduction  It is possible that due to dietary indiscretion and history of ingestion of 2 of the alcoholic beer drinks that there was significant of gaseous distention in her hiatal hernia that caused the discomfort  Suspect that there was a vasovagal reaction to her intake  However will refer her to cardiology for further evaluation despite no EKG changes and negative troponin due to patient request given multiple risk factors of hypertension, hyperlipidemia and advanced age      1  Hypertension, essential  Assessment & Plan:  Continue to hold medication at this time  Patient was instructed to continue to monitor blood pressure at home she knows when to restart the medication as necessary if blood pressure readings are high  4  Gastroesophageal reflux disease with esophagitis without hemorrhage  Assessment & Plan:  Continue present medication treatment and dietary modification  Follow-up for further discussion with GI      5  Hyperglycemia  -     HEMOGLOBIN A1C W/ EAG ESTIMATION;  Future           Subjective Chief Complaint   Patient presents with   • Follow-up     Patient was in er on Saturday for near syncope they suggested she follow up with A cardiologist would like to discuss   • Health Screening     Awv scheduled for 8/22 patient would not like to have dexa scan done     HPI     Last week was at a graduation party which she spent most of the day outside  Also admits to eating differently than what she is normally used to  Ate a piece of pizza also had 2 beers with dinner in addition to food and water  Additionally she had a piece of cake following which she started to feel lightheaded and nauseous and felt like she was going to pass out and hence was seen in emergency department at [facility]  She had a complete work-up with lab work showing evidence of mild dehydration with a low sodium of 132, mild elevation of glucose, negative troponin and EKG was without ischemia  She is here today for follow-up visit    Review of Systems   Allergic/Immunologic: Positive for environmental allergies  Neurological: Positive for syncope, weakness and light-headedness  Recent episode   All other systems reviewed and are negative  Past Medical History:   Diagnosis Date   • LYLE (acute kidney injury) ([facility] [address] ) 3/22/2021   • Dehydration after exertion 3/25/2021   • Disease of thyroid gland    • Hypertension    • Osteopenia     last assessed 12/17/13   • PONV (postoperative nausea and vomiting)    • Syncope and collapse 3/22/2021   • Vaginal atrophy      Past Surgical History:   Procedure Laterality Date   • COLONOSCOPY     • DILATION AND CURETTAGE OF UTERUS     • TUBAL LIGATION       Family History   Problem Relation Age of Onset   • Lymphoma Mother 79        acute   • Hyperlipidemia Father    • Peripheral vascular disease Father    • Breast cancer Sister    • Leukemia Brother    • Other Other         4 children living     Social History     Socioeconomic History   • Marital status:       Spouse name: None   • Number of children: None   • Years of education: None   • Highest education level: None   Occupational History   • Occupation:    Tobacco Use   • Smoking status: Former     Packs/day: 0 25     Years: 9 00     Total pack years: 2 25     Types: Cigarettes     Start date: 63     Quit date: 1970     Years since quittin 4   • Smokeless tobacco: Never   • Tobacco comments:     about 45 years ago    Vaping Use   • Vaping Use: Never used   Substance and Sexual Activity   • Alcohol use: Yes     Comment: rare occasion   • Drug use: No   • Sexual activity: Not Currently   Other Topics Concern   • None   Social History Narrative    Travel    She enjoys cooking    Denied hx of exercise habits     Social Determinants of Health     Financial Resource Strain: Not on file   Food Insecurity: Not on file   Transportation Needs: Not on file   Physical Activity: Not on file   Stress: Not on file   Social Connections: Not on file   Intimate Partner Violence: Not on file   Housing Stability: Not on file     Current Outpatient Medications on File Prior to Visit   Medication Sig   • Cholecalciferol (VITAMIN D3) 2000 units CHEW Chew 2 each daily    • COD LIVER OIL PO Take by mouth 1 tsp daily   • levothyroxine (Synthroid) 75 mcg tablet Take 1 tablet (75 mcg total) by mouth daily   • mometasone (ELOCON) 0 1 % cream Apply topically daily as needed (allergies)   • pantoprazole (PROTONIX) 40 mg tablet Take 1 tablet (40 mg total) by mouth daily   • candesartan (ATACAND) 4 mg tablet Take 1 tablet (4 mg total) by mouth daily (Patient not taking: Reported on 2023)     Allergies   Allergen Reactions   • Other      Seasonal allergies      Immunization History   Administered Date(s) Administered   • COVID-19 PFIZER VACCINE 0 3 ML IM 2021, 2021, 2021   • Tdap 2013       Objective     /82 (BP Location: Left arm, Patient Position: Sitting, Cuff Size: Standard)   Pulse 82   Temp 97 5 °F (36 4 "°C) (Temporal)   Ht 5' 3 62\" (1 616 m)   Wt 65 kg (143 lb 3 2 oz)   SpO2 98%   BMI 24 87 kg/m²     Physical Exam       Gen: NAD, face is mildly flushed  Heent: atraumatic, normocephalic  Mouth: is moist  Neck: is supple, no JVD, no carotid bruits     Heart: is regular Normal s1, s2,  Lungs: are clear to auscultation  Abd: soft, non tender, NABS  Ext: no edema, distal pulses normal  Skin: no rashes, ulcers  Mood: normal affect  Neuro: AAOx3  Heather Mccray MD  "

## 2023-06-13 NOTE — ASSESSMENT & PLAN NOTE
Patient had a vasovagal episode on Mitra 10  This was associated with dietary indiscretion on her part  Previous history of hiatal hernia with intolerance to large meals  She admits to eating more than what she normally does even though the amount she ate was not very high  This was followed with an episode of flushing nausea feeling of lightheadedness and attempt to throw up  she also felt clammy  and had near syncope with her eyes rolling up

## 2023-06-13 NOTE — ASSESSMENT & PLAN NOTE
Continue present medication treatment and dietary modification    Follow-up for further discussion with GI

## 2023-06-13 NOTE — ASSESSMENT & PLAN NOTE
Long discussion with patient regarding options for treatment of hiatal hernia  Recommend follow-up for evaluation and possible laparoscopic reduction  It is possible that due to dietary indiscretion and history of ingestion of 2 of the alcoholic beer drinks that there was significant of gaseous distention in her hiatal hernia that caused the discomfort  Suspect that there was a vasovagal reaction to her intake    However will refer her to cardiology for further evaluation despite no EKG changes and negative troponin due to patient request given multiple risk factors of hypertension, hyperlipidemia and advanced age

## 2023-06-15 ENCOUNTER — TELEPHONE (OUTPATIENT)
Dept: GASTROENTEROLOGY | Facility: CLINIC | Age: 78
End: 2023-06-15

## 2023-06-15 NOTE — TELEPHONE ENCOUNTER
Patients GI provider:  Dr Mali Ryan     Number to return call: ( [phone]    Reason for call: Pt daughter calling  regarding a referral to a Surgeon / once referral is placed please send message to patients portal

## 2023-06-16 DIAGNOSIS — K44.9 HIATAL HERNIA: Primary | ICD-10-CM

## 2023-06-19 ENCOUNTER — TELEPHONE (OUTPATIENT)
Dept: HEMATOLOGY ONCOLOGY | Facility: CLINIC | Age: 78
End: 2023-06-19

## 2023-06-19 NOTE — TELEPHONE ENCOUNTER
I called Iain Ng in response to a referral that was received for patient to establish care with Thoracic Surgery  Outreach was made to complete patient's intake questionnaire   I left a voicemail explaining the reason for my call and advised patient to call [facility] at [phone]  Another attempt will be made to contact patient

## 2023-07-11 ENCOUNTER — OFFICE VISIT (OUTPATIENT)
Dept: CARDIAC SURGERY | Facility: CLINIC | Age: 78
End: 2023-07-11
Payer: MEDICARE

## 2023-07-11 VITALS
SYSTOLIC BLOOD PRESSURE: 120 MMHG | TEMPERATURE: 97.4 F | HEIGHT: 62 IN | DIASTOLIC BLOOD PRESSURE: 80 MMHG | WEIGHT: 138.67 LBS | HEART RATE: 71 BPM | OXYGEN SATURATION: 100 % | RESPIRATION RATE: 16 BRPM | BODY MASS INDEX: 25.52 KG/M2

## 2023-07-11 DIAGNOSIS — K44.9 HIATAL HERNIA: ICD-10-CM

## 2023-07-11 PROCEDURE — 99204 OFFICE O/P NEW MOD 45 MIN: CPT | Performed by: THORACIC SURGERY (CARDIOTHORACIC VASCULAR SURGERY)

## 2023-07-11 NOTE — PROGRESS NOTES
Thoracic Consult  Assessment/Plan:    Hiatal hernia  Lizbeth Felix presents to our office for consultation for hiatal hernia. She has had progressive postprandial fullness and early satiety refractory to pantoprazole therapy and dietary modifications, and she is interested in pursuing surgical management. She will follow up at our office to discuss surgery after esophageal manometry testing and cardiology consultation. She may reach out with any questions in the meantime. Diagnoses and all orders for this visit:    Hiatal hernia  -     Ambulatory Referral to Thoracic Surgery  -     Esophageal manometry; Future          Thoracic History   Diagnosis:    Procedures/Surgeries:    Pathology:    Adjuvant Therapy:       Subjective:    Patient ID: Angeles Barraza is a 78 y.o. female. Lizbeth Felix is a 78 y.o. female with a past medical history of thyroid disease on Levothyroxine and and hiatal hernia that is managed with pantoprazole 40 mg. She presents to the office today for a consult regarding her hiatal hernia. [place] upper GI UGI 5/5/2023 showed mixed hiatal hernia with mild to moderate gastroesophageal reflux. EGD on 2/14/23 showed Medium sliding hiatal hernia (type I hiatal hernia) without Nancy Peter lesions present. She states that she has had postprandial fullness for years, but she has started noticing it more in the last 2 years. She also has early satiety and has been eating less than usual. She had 2 episodes of syncope in the last 2 years that were associated with eating large meals, and hospital workup was largely negative. She manages her symptoms with pantoprazole and small, frequent meals. These measures previously helped, but she is starting to have worsening symptoms and is interested in discussing surgical options. She reports about 5 lb weight loss in the last few months. She denies heartburn, dysphagia, nausea, vomiting, regurgitation chest pain, or shortness of breath.       The following portions of the patient's history were reviewed and updated as appropriate: allergies, current medications, past family history, past medical history, past social history, past surgical history and problem list.    Past Medical History:   Diagnosis Date   • LYLE (acute kidney injury) ([address]) 3/22/2021   • Dehydration after exertion 3/25/2021   • Disease of thyroid gland    • Hypertension    • Osteopenia     last assessed 13   • PONV (postoperative nausea and vomiting)    • Syncope and collapse 3/22/2021   • Vaginal atrophy       Past Surgical History:   Procedure Laterality Date   • COLONOSCOPY     • DILATION AND CURETTAGE OF UTERUS     • TUBAL LIGATION        Family History   Problem Relation Age of Onset   • Lymphoma Mother 79        acute   • Hyperlipidemia Father    • Peripheral vascular disease Father    • Breast cancer Sister    • Leukemia Brother    • Other Other         4 children living      Social History     Socioeconomic History   • Marital status:       Spouse name: Not on file   • Number of children: Not on file   • Years of education: Not on file   • Highest education level: Not on file   Occupational History   • Occupation:    Tobacco Use   • Smoking status: Former     Packs/day: 0.25     Years: 9.00     Total pack years: 2.25     Types: Cigarettes     Start date: 63     Quit date: 5     Years since quittin.5   • Smokeless tobacco: Never   • Tobacco comments:     about 45 years ago    Vaping Use   • Vaping Use: Never used   Substance and Sexual Activity   • Alcohol use: Yes     Comment: rare occasion   • Drug use: No   • Sexual activity: Not Currently   Other Topics Concern   • Not on file   Social History Narrative    Travel    She enjoys cooking    Denied hx of exercise habits     Social Determinants of Health     Financial Resource Strain: Not on file   Food Insecurity: Not on file   Transportation Needs: Not on file   Physical Activity: Not on file   Stress: Not on file   Social Connections: Not on file   Intimate Partner Violence: Not on file   Housing Stability: Not on file      Review of Systems   Constitutional: Negative for activity change and appetite change. HENT: Negative for trouble swallowing. Eyes: Negative for discharge. Respiratory: Negative for choking, chest tightness and shortness of breath. Cardiovascular: Negative for chest pain. Gastrointestinal: Negative for constipation, diarrhea, nausea and vomiting. Endocrine: Negative for polyuria. Genitourinary: Negative for difficulty urinating. Musculoskeletal: Negative for arthralgias. Skin: Negative for rash. Neurological: Negative for facial asymmetry. Psychiatric/Behavioral: Negative for behavioral problems. Objective:   Physical Exam  Constitutional:       Appearance: Normal appearance. HENT:      Head: Normocephalic and atraumatic. Right Ear: External ear normal.      Left Ear: External ear normal.      Mouth/Throat:      Mouth: Mucous membranes are moist.      Pharynx: Oropharynx is clear. Eyes:      Extraocular Movements: Extraocular movements intact. Cardiovascular:      Rate and Rhythm: Normal rate and regular rhythm. Pulses: Normal pulses. Heart sounds: Normal heart sounds. Pulmonary:      Effort: Pulmonary effort is normal.      Breath sounds: Normal breath sounds. Abdominal:      General: Abdomen is flat. Palpations: Abdomen is soft. Musculoskeletal:         General: Normal range of motion. Cervical back: Normal range of motion. Skin:     General: Skin is warm and dry. Neurological:      Mental Status: She is alert and oriented to person, place, and time.    Psychiatric:         Mood and Affect: Mood normal.         Behavior: Behavior normal.     /80 (BP Location: Left arm, Patient Position: Sitting, Cuff Size: Standard)   Pulse 71   Temp (!) 97.4 °F (36.3 °C) (Temporal)   Resp 16   Ht 5' 2" (1.575 m)   Wt 62.9 kg (138 lb 10.7 oz) SpO2 100%   BMI 25.36 kg/m²     XR CHEST PA & LATERAL    Result Date: 6/10/2023  Impression IMPRESSION: No pneumonia or CHF detected.  [facility]:[id number]

## 2023-07-12 ENCOUNTER — TELEPHONE (OUTPATIENT)
Dept: HEMATOLOGY ONCOLOGY | Facility: CLINIC | Age: 78
End: 2023-07-12

## 2023-07-12 NOTE — TELEPHONE ENCOUNTER
Appointment Confirmation   Who are you speaking with? Patient   If it is not the patient, are they listed on an active communication consent form? Yes   Which provider is the appointment scheduled with? Dr. Leopoldo Lulas   When is the appointment scheduled? Please list date and time 9/12/23 @ 8:45am   At which location is the appointment scheduled to take place? [place]   Did caller verbalize understanding of appointment details?  yes

## 2023-07-14 ENCOUNTER — TELEPHONE (OUTPATIENT)
Dept: GASTROENTEROLOGY | Facility: HOSPITAL | Age: 78
End: 2023-07-14

## 2023-07-17 ENCOUNTER — TELEPHONE (OUTPATIENT)
Dept: GASTROENTEROLOGY | Facility: HOSPITAL | Age: 78
End: 2023-07-17

## 2023-07-25 ENCOUNTER — HOSPITAL ENCOUNTER (OUTPATIENT)
Dept: GASTROENTEROLOGY | Facility: HOSPITAL | Age: 78
Discharge: HOME | End: 2023-07-25
Attending: THORACIC SURGERY (CARDIOTHORACIC VASCULAR SURGERY)
Payer: MEDICARE

## 2023-07-25 DIAGNOSIS — K44.9 HIATAL HERNIA: ICD-10-CM

## 2023-07-25 PROCEDURE — 91010 ESOPHAGUS MOTILITY STUDY: CPT

## 2023-07-25 RX ADMIN — TOPICAL ANESTHETIC 2 SPRAY: 200 SPRAY DENTAL; PERIODONTAL at 07:26

## 2023-07-25 NOTE — PERIOPERATIVE NURSING NOTE
Patient brought in the room and educated on procedure with daughter present in the room. Benzocaine 20% oral sprayed to oral pharynx and allowed to take affect. Manometry catheter inserted via lright nostril, positioned and secured. 10 liquid swallow, 10 viscous swallow and 1 rapid swallow,5 upright swallows and 1 rapid drink challenge with 200 cc of water performed. Patient tolerated procedure. Catheter removed intact. Discharge instructions given to the patient and patient left the room in stable condition.

## 2023-07-27 PROCEDURE — 91020 GASTRIC MOTILITY STUDIES: CPT | Performed by: INTERNAL MEDICINE

## 2023-08-11 ENCOUNTER — APPOINTMENT (OUTPATIENT)
Dept: LAB | Facility: MEDICAL CENTER | Age: 78
End: 2023-08-11
Payer: MEDICARE

## 2023-08-11 DIAGNOSIS — E53.8 VITAMIN B12 DEFICIENCY: ICD-10-CM

## 2023-08-11 DIAGNOSIS — R73.9 HYPERGLYCEMIA: ICD-10-CM

## 2023-08-11 DIAGNOSIS — E01.8 IODINE HYPOTHYROIDISM: ICD-10-CM

## 2023-08-11 DIAGNOSIS — K44.9 HIATAL HERNIA: ICD-10-CM

## 2023-08-11 DIAGNOSIS — D50.0 IRON DEFICIENCY ANEMIA DUE TO CHRONIC BLOOD LOSS: ICD-10-CM

## 2023-08-11 LAB
ANION GAP SERPL CALCULATED.3IONS-SCNC: 5 MMOL/L
BASOPHILS # BLD AUTO: 0.04 THOUSANDS/ÂΜL (ref 0–0.1)
BASOPHILS NFR BLD AUTO: 1 % (ref 0–1)
BUN SERPL-MCNC: 15 MG/DL (ref 5–25)
CALCIUM SERPL-MCNC: 9.3 MG/DL (ref 8.3–10.1)
CHLORIDE SERPL-SCNC: 105 MMOL/L (ref 96–108)
CO2 SERPL-SCNC: 28 MMOL/L (ref 21–32)
CREAT SERPL-MCNC: 0.73 MG/DL (ref 0.6–1.3)
EOSINOPHIL # BLD AUTO: 0.28 THOUSAND/ÂΜL (ref 0–0.61)
EOSINOPHIL NFR BLD AUTO: 8 % (ref 0–6)
ERYTHROCYTE [DISTWIDTH] IN BLOOD BY AUTOMATED COUNT: 13.9 % (ref 11.6–15.1)
FERRITIN SERPL-MCNC: 27 NG/ML (ref 11–307)
GFR SERPL CREATININE-BSD FRML MDRD: 78 ML/MIN/1.73SQ M
GLUCOSE P FAST SERPL-MCNC: 88 MG/DL (ref 65–99)
HCT VFR BLD AUTO: 41.4 % (ref 34.8–46.1)
HGB BLD-MCNC: 13.4 G/DL (ref 11.5–15.4)
IMM GRANULOCYTES # BLD AUTO: 0.01 THOUSAND/UL (ref 0–0.2)
IMM GRANULOCYTES NFR BLD AUTO: 0 % (ref 0–2)
IRON SATN MFR SERPL: 38 % (ref 15–50)
IRON SERPL-MCNC: 72 UG/DL (ref 50–170)
LYMPHOCYTES # BLD AUTO: 1.36 THOUSANDS/ÂΜL (ref 0.6–4.47)
LYMPHOCYTES NFR BLD AUTO: 38 % (ref 14–44)
MCH RBC QN AUTO: 32.4 PG (ref 26.8–34.3)
MCHC RBC AUTO-ENTMCNC: 32.4 G/DL (ref 31.4–37.4)
MCV RBC AUTO: 100 FL (ref 82–98)
MONOCYTES # BLD AUTO: 0.51 THOUSAND/ÂΜL (ref 0.17–1.22)
MONOCYTES NFR BLD AUTO: 14 % (ref 4–12)
NEUTROPHILS # BLD AUTO: 1.41 THOUSANDS/ÂΜL (ref 1.85–7.62)
NEUTS SEG NFR BLD AUTO: 39 % (ref 43–75)
NRBC BLD AUTO-RTO: 0 /100 WBCS
PLATELET # BLD AUTO: 234 THOUSANDS/UL (ref 149–390)
PMV BLD AUTO: 10.5 FL (ref 8.9–12.7)
POTASSIUM SERPL-SCNC: 4.2 MMOL/L (ref 3.5–5.3)
RBC # BLD AUTO: 4.13 MILLION/UL (ref 3.81–5.12)
SODIUM SERPL-SCNC: 138 MMOL/L (ref 135–147)
TIBC SERPL-MCNC: 192 UG/DL (ref 250–450)
TSH SERPL DL<=0.05 MIU/L-ACNC: 2.97 UIU/ML (ref 0.45–4.5)
WBC # BLD AUTO: 3.61 THOUSAND/UL (ref 4.31–10.16)

## 2023-08-11 PROCEDURE — 82728 ASSAY OF FERRITIN: CPT

## 2023-08-11 PROCEDURE — 83918 ORGANIC ACIDS TOTAL QUANT: CPT

## 2023-08-11 PROCEDURE — 36415 COLL VENOUS BLD VENIPUNCTURE: CPT

## 2023-08-11 PROCEDURE — 80048 BASIC METABOLIC PNL TOTAL CA: CPT

## 2023-08-11 PROCEDURE — 85025 COMPLETE CBC W/AUTO DIFF WBC: CPT

## 2023-08-11 PROCEDURE — 84443 ASSAY THYROID STIM HORMONE: CPT

## 2023-08-11 PROCEDURE — 83036 HEMOGLOBIN GLYCOSYLATED A1C: CPT

## 2023-08-11 PROCEDURE — 83540 ASSAY OF IRON: CPT

## 2023-08-11 PROCEDURE — 83550 IRON BINDING TEST: CPT

## 2023-08-12 LAB
EST. AVERAGE GLUCOSE BLD GHB EST-MCNC: 117 MG/DL
HBA1C MFR BLD: 5.7 %

## 2023-08-15 ENCOUNTER — RA CDI HCC (OUTPATIENT)
Dept: OTHER | Facility: HOSPITAL | Age: 78
End: 2023-08-15

## 2023-08-15 NOTE — PROGRESS NOTES
[facility] coding opportunities       Chart reviewed, no opportunity found: CHART REVIEWED, NO OPPORTUNITY FOUND        Patients Insurance     Medicare Insurance: Medicare

## 2023-08-16 LAB — METHYLMALONATE SERPL-SCNC: 132 NMOL/L (ref 0–378)

## 2023-08-22 ENCOUNTER — OFFICE VISIT (OUTPATIENT)
Dept: INTERNAL MEDICINE CLINIC | Facility: OTHER | Age: 78
End: 2023-08-22
Payer: MEDICARE

## 2023-08-22 VITALS
DIASTOLIC BLOOD PRESSURE: 80 MMHG | TEMPERATURE: 97.8 F | HEIGHT: 62 IN | HEART RATE: 77 BPM | SYSTOLIC BLOOD PRESSURE: 142 MMHG | BODY MASS INDEX: 25.47 KG/M2 | OXYGEN SATURATION: 99 % | WEIGHT: 138.4 LBS

## 2023-08-22 DIAGNOSIS — R91.1 LUNG NODULE SEEN ON IMAGING STUDY: ICD-10-CM

## 2023-08-22 DIAGNOSIS — E01.8 IODINE HYPOTHYROIDISM: ICD-10-CM

## 2023-08-22 DIAGNOSIS — E03.4 HYPOTHYROIDISM DUE TO ACQUIRED ATROPHY OF THYROID: ICD-10-CM

## 2023-08-22 DIAGNOSIS — Z00.00 MEDICARE ANNUAL WELLNESS VISIT, SUBSEQUENT: ICD-10-CM

## 2023-08-22 DIAGNOSIS — R73.09 ELEVATED HEMOGLOBIN A1C: ICD-10-CM

## 2023-08-22 DIAGNOSIS — K44.9 HIATAL HERNIA: ICD-10-CM

## 2023-08-22 DIAGNOSIS — E78.00 HYPERCHOLESTEROLEMIA: ICD-10-CM

## 2023-08-22 DIAGNOSIS — K21.00 GASTROESOPHAGEAL REFLUX DISEASE WITH ESOPHAGITIS WITHOUT HEMORRHAGE: ICD-10-CM

## 2023-08-22 DIAGNOSIS — R73.09 ELEVATED HEMOGLOBIN A1C MEASUREMENT: ICD-10-CM

## 2023-08-22 DIAGNOSIS — I10 HYPERTENSION, ESSENTIAL: Primary | ICD-10-CM

## 2023-08-22 PROCEDURE — G0439 PPPS, SUBSEQ VISIT: HCPCS | Performed by: INTERNAL MEDICINE

## 2023-08-22 PROCEDURE — 99214 OFFICE O/P EST MOD 30 MIN: CPT | Performed by: INTERNAL MEDICINE

## 2023-08-22 RX ORDER — LEVOTHYROXINE SODIUM 0.07 MG/1
75 TABLET ORAL DAILY
Qty: 90 TABLET | Refills: 1 | Status: SHIPPED | OUTPATIENT
Start: 2023-08-22

## 2023-08-22 NOTE — PROGRESS NOTES
Assessment and Plan:     Problem List Items Addressed This Visit        Digestive    Gastroesophageal reflux disease with esophagitis without hemorrhage     Continued F/U with GI. Presently on Protonix and symptoms are well managed. Hiatal hernia     Has been evaluated for a possible repair . Presently on PPI for symptom control            Endocrine    Hypothyroid     Continue present med dosage to monitor regularly. Fairly stable levels. Relevant Medications    levothyroxine (Synthroid) 75 mcg tablet       Cardiovascular and Mediastinum    Hypertension, essential - Primary     This is presently being managed with diet and lifestyle modification alone. Relevant Orders    CBC and differential    Comprehensive metabolic panel    Lipid Panel with Direct LDL reflex    TSH, 3rd generation with Free T4 reflex    UA w Reflex to Microscopic w Reflex to Culture       Other    Hypercholesterolemia     Also being managed with diet alone due to high HDL relative to the LDLc         Elevated hemoglobin A1c measurement   Other Visit Diagnoses     Iodine hypothyroidism        Relevant Medications    levothyroxine (Synthroid) 75 mcg tablet    Lung nodule seen on imaging study        Relevant Orders    CT lung nodule follow-up    Elevated hemoglobin A1c        Relevant Orders    Hemoglobin A1C        BMI Counseling: Body mass index is 25.31 kg/m². The BMI is above normal. Nutrition recommendations include encouraging healthy choices of fruits and vegetables and moderation in carbohydrate intake. Exercise recommendations include moderate physical activity 150 minutes/week. Rationale for BMI follow-up plan is due to patient being overweight or obese. Falls Plan of Care: balance, strength, and gait training instructions were provided. Vitamin D supplementation was recommended. Urinary Incontinence Plan of Care: counseling topics discussed: preventing constipation.      Lung Cancer Screening Shared Decision Making: I discussed with her that she is a candidate for lung cancer CT screening. The following Shared Decision-Making points were covered:  1. Benefits of screening were discussed, including the rates of reduction in death from lung cancer and other causes. Harms of screening were reviewed, including false positive tests, radiation exposure levels, risks of invasive procedures, risks of complications of screening, and risk of overdiagnosis. 2. I counseled on the importance of adherence to annual lung cancer LDCT screening, impact of co-morbidities, and ability or willingness to undergo diagnosis and treatment. 3. I counseled on the importance of maintaining abstinence as a former smoker or was counseled on the importance of smoking cessation if a current smoker    Review of Eligibility Criteria: She meets all of the criteria for Lung Cancer Screening.   - She is 78 y.o.   - She has 20 pack year tobacco history and is a current smoker or has quit within the past 15 years  - She presents no signs or symptoms of lung cancer    After discussion, the patient decided to elect lung cancer screening. Preventive health issues were discussed with patient, and age appropriate screening tests were ordered as noted in patient's After Visit Summary. Personalized health advice and appropriate referrals for health education or preventive services given if needed, as noted in patient's After Visit Summary. History of Present Illness:     Chief Complaint   Patient presents with   • Medicare Wellness Visit   •      Pt due for:  DXA - currently refusing   BMI f/u plan   • Results     Labs done on 8/11/223         HPI     Patient here today for Medicare annual visit and follow-up. She was present today with her daughter for her visit. He has been seen by CT surgery for evaluation for hiatal hernia and possible repair. This will be done endoscopically.   We will need cardiology clearance because of a previous history of syncope. Work-up at that time was negative and was felt to be secondary to dehydration and orthostasis. Overall feels well and has no other issues at this time. We discussed many other things including her dietary intake and accommodations for her hiatal hernia. She is regular with her Synthroid medication and has had fairly stable lab values. She is on supplementation for B12 deficiency. She was mildly concerned because of her mild elevation in her hemoglobin A1c at 5.7  Patient Care Team:  Rc Bland MD as PCP - General     Review of Systems:     Review of Systems   Gastrointestinal:        Hx hiatal hernia, early satiety   Skin: Positive for color change and rash. rosacea   Allergic/Immunologic: Positive for environmental allergies. All other systems reviewed and are negative.        Problem List:     Patient Active Problem List   Diagnosis   • Atrophic vaginitis   • Hypercholesterolemia   • Hypertension, essential   • Rosacea   • Vitamin B12 deficiency   • Vitamin D deficiency   • Hypothyroid   • Vasovagal near-syncope   • Dysphagia   • Iron deficiency anemia due to chronic blood loss   • Gastroesophageal reflux disease with esophagitis without hemorrhage   • Hiatal hernia   • Elevated hemoglobin A1c measurement   • Osteopenia      Past Medical and Surgical History:     Past Medical History:   Diagnosis Date   • LYLE (acute kidney injury) ([address]) 3/22/2021   • Dehydration after exertion 3/25/2021   • Disease of thyroid gland    • Hypertension    • Osteopenia     last assessed 12/17/13   • PONV (postoperative nausea and vomiting)    • Syncope and collapse 3/22/2021   • Vaginal atrophy      Past Surgical History:   Procedure Laterality Date   • COLONOSCOPY     • DILATION AND CURETTAGE OF UTERUS     • TUBAL LIGATION        Family History:     Family History   Problem Relation Age of Onset   • Lymphoma Mother 79        acute   • Hyperlipidemia Father    • Peripheral vascular disease Father    • Breast cancer Sister    • Leukemia Brother    • Cancer Brother         leukemia   • Other Other         4 children living      Social History:     Social History     Socioeconomic History   • Marital status:      Spouse name: None   • Number of children: None   • Years of education: None   • Highest education level: None   Occupational History   • Occupation:    Tobacco Use   • Smoking status: Former     Packs/day: 0.25     Years: 8.00     Total pack years: 2.00     Types: Cigarettes     Start date: 63     Quit date: 1970     Years since quittin.6   • Smokeless tobacco: Never   • Tobacco comments:     about 39 years ago    Vaping Use   • Vaping Use: Never used   Substance and Sexual Activity   • Alcohol use: Not Currently     Comment: rare occasion   • Drug use: No   • Sexual activity: Not Currently   Other Topics Concern   • None   Social History Narrative    Travel    She enjoys cooking    Denied hx of exercise habits     Social Determinants of Health     Financial Resource Strain: Unknown (2023)    Overall Financial Resource Strain (CARDIA)    • Difficulty of Paying Living Expenses: Patient refused   Food Insecurity: Not on file   Transportation Needs: No Transportation Needs (2023)    PRAPARE - Transportation    • Lack of Transportation (Medical): No    • Lack of Transportation (Non-Medical):  No   Physical Activity: Not on file   Stress: Not on file   Social Connections: Not on file   Intimate Partner Violence: Not on file   Housing Stability: Not on file      Medications and Allergies:     Current Outpatient Medications   Medication Sig Dispense Refill   • Cholecalciferol (VITAMIN D3) 2000 units CHEW Chew 2 each daily      • COD LIVER OIL PO Take by mouth 1 tsp daily     • cyanocobalamin (VITAMIN B-12) 1000 MCG tablet Take 1,000 mcg by mouth daily     • levothyroxine (Synthroid) 75 mcg tablet Take 1 tablet (75 mcg total) by mouth daily 90 tablet 1   • mometasone (ELOCON) 0.1 % cream Apply topically daily as needed (allergies) 45 g 1   • pantoprazole (PROTONIX) 40 mg tablet Take 1 tablet (40 mg total) by mouth daily 90 tablet 3     Current Facility-Administered Medications   Medication Dose Route Frequency Provider Last Rate Last Admin   • cyanocobalamin injection 1,000 mcg  1,000 mcg Intramuscular Q30 Days Charisse Berry MD   1,000 mcg at 08/16/22 1110     Allergies   Allergen Reactions   • Other Allergic Rhinitis     Seasonal allergies       Immunizations:     Immunization History   Administered Date(s) Administered   • COVID-19 PFIZER VACCINE 0.3 ML IM 01/19/2021, 02/08/2021, 11/06/2021   • Tdap 11/24/2013      Health Maintenance:         Topic Date Due   • DXA SCAN  10/17/2014   • Breast Cancer Screening: Mammogram  01/23/2024   • Colorectal Cancer Screening  08/30/2026   • Hepatitis C Screening  Completed         Topic Date Due   • Pneumococcal Vaccine: 65+ Years (1 - PCV) Never done   • COVID-19 Vaccine (4 - Pfizer series) 01/01/2022   • Influenza Vaccine (1) 09/01/2023      Medicare Screening Tests and Risk Assessments:     Veronique Jeronimo is here for her Subsequent Wellness visit. Last Medicare Wellness visit information reviewed, patient interviewed, no change since last AWV. Last Medicare Wellness visit information reviewed, patient interviewed and updates made to the record today. Health Risk Assessment:   Patient rates overall health as good. Patient feels that their physical health rating is same. Patient is very satisfied with their life. Eyesight was rated as same. Hearing was rated as same. Patient feels that their emotional and mental health rating is same. Patients states they are never, rarely angry. Patient states they are never, rarely unusually tired/fatigued. Pain experienced in the last 7 days has been none. Patient states that she has experienced no weight loss or gain in last 6 months. Fall Risk Screening:    In the past year, patient has experienced: no history of falling in past year      Urinary Incontinence Screening:   Patient has not leaked urine accidently in the last six months. Home Safety:  Patient does not have trouble with stairs inside or outside of their home. Patient has working smoke alarms and has working carbon monoxide detector. Home safety hazards include: none. Nutrition:   Current diet is Regular. Don't eat junk food, minimize added salt, eat mostly salmon and chicken, eat lots of salads and fruits    Medications:   Patient is currently taking over-the-counter supplements. OTC medications include: vitamin d3, vitamin c, vitamin B12, Cod Liver Oil, Multi-vitamin. Patient is able to manage medications. Activities of Daily Living (ADLs)/Instrumental Activities of Daily Living (IADLs):   Walk and transfer into and out of bed and chair?: Yes  Dress and groom yourself?: Yes    Bathe or shower yourself?: Yes    Feed yourself? Yes  Do your laundry/housekeeping?: Yes  Manage your money, pay your bills and track your expenses?: Yes  Make your own meals?: Yes    Do your own shopping?: Yes    Previous Hospitalizations:   Any hospitalizations or ED visits within the last 12 months?: Yes    How many hospitalizations have you had in the last year?: 1-2    Advance Care Planning:   Living will: Yes    Durable POA for healthcare:  Yes    Advanced directive: Yes      PREVENTIVE SCREENINGS      Cardiovascular Screening:    General: Screening Not Indicated and History Lipid Disorder      Diabetes Screening:     General: Screening Current      Colorectal Cancer Screening:     General: Screening Current      Breast Cancer Screening:     General: Screening Current      Cervical Cancer Screening:    General: Screening Not Indicated      Lung Cancer Screening:     General: Screening Not Indicated      Hepatitis C Screening:    General: Screening Current    Screening, Brief Intervention, and Referral to Treatment (SBIRT)    Screening  Typical number of drinks in a day: 0  Typical number of drinks in a week: 0  Interpretation: Low risk drinking behavior. AUDIT-C Screenin) How often did you have a drink containing alcohol in the past year? monthly or less  2) How many drinks did you have on a typical day when you were drinking in the past year? 1 to 2  3) How often did you have 6 or more drinks on one occasion in the past year? never    AUDIT-C Score: 1  Interpretation: Score 0-2 (female): Negative screen for alcohol misuse    Single Item Drug Screening:  How often have you used an illegal drug (including marijuana) or a prescription medication for non-medical reasons in the past year? never    Single Item Drug Screen Score: 0  Interpretation: Negative screen for possible drug use disorder    No results found. Physical Exam:     /80 (BP Location: Left arm, Patient Position: Sitting, Cuff Size: Standard)   Pulse 77   Temp 97.8 °F (36.6 °C) (Temporal)   Ht 5' 2" (1.575 m)   Wt 62.8 kg (138 lb 6.4 oz)   SpO2 99%   BMI 25.31 kg/m²     Physical Exam     Gen: NAD  Heent: atraumatic, normocephalic  Mouth: is moist  Face : Rosacea  Neck: is supple, no JVD, no carotid bruits.    Heart: is regular Normal s1, s2,  Lungs: are clear to auscultation  Abd: soft, non tender, NABS  Ext: no edema, distal pulses normal  Skin: facial erthema  Mood: normal affect  Neuro: AAOx3    Jono Rossi MD

## 2023-08-22 NOTE — ASSESSMENT & PLAN NOTE
Patient would like to hold off on further testing with DEXA at this point. She was counseled about osteopenia and further risk for osteoporosis. In the meanwhile we will maintain a good vitamin D and calcium level. Continue weightbearing exercise as before.

## 2023-08-28 ENCOUNTER — OFFICE VISIT (OUTPATIENT)
Dept: GASTROENTEROLOGY | Facility: AMBULARY SURGERY CENTER | Age: 78
End: 2023-08-28
Payer: MEDICARE

## 2023-08-28 VITALS
BODY MASS INDEX: 24.45 KG/M2 | OXYGEN SATURATION: 98 % | DIASTOLIC BLOOD PRESSURE: 82 MMHG | WEIGHT: 138 LBS | HEIGHT: 63 IN | SYSTOLIC BLOOD PRESSURE: 142 MMHG | HEART RATE: 78 BPM

## 2023-08-28 DIAGNOSIS — R13.19 ESOPHAGEAL DYSPHAGIA: ICD-10-CM

## 2023-08-28 DIAGNOSIS — K21.00 GASTROESOPHAGEAL REFLUX DISEASE WITH ESOPHAGITIS WITHOUT HEMORRHAGE: ICD-10-CM

## 2023-08-28 DIAGNOSIS — K44.9 HIATAL HERNIA: Primary | ICD-10-CM

## 2023-08-28 PROCEDURE — 99214 OFFICE O/P EST MOD 30 MIN: CPT | Performed by: INTERNAL MEDICINE

## 2023-08-28 RX ORDER — PANTOPRAZOLE SODIUM 40 MG/1
40 TABLET, DELAYED RELEASE ORAL DAILY
Qty: 90 TABLET | Refills: 3 | Status: SHIPPED | OUTPATIENT
Start: 2023-08-28

## 2023-08-28 NOTE — LETTER
August 28, 2023     Kinza Wick, [address] [facility] [address]    Patient: Jim Stock   YOB: 1945   Date of Visit: 8/28/2023       Dear Dr. Anne Varela: Thank you for referring Phani Argueta to me for evaluation. Below are my notes for this consultation. If you have questions, please do not hesitate to call me. I look forward to following your patient along with you. Sincerely,        Ashish Pack MD        CC: No Recipients    Ashish Pack MD  8/28/2023 11:16 AM  Sign when Signing Visit  [address]  [facility]  Jim Stock 78 y.o. female MRN: [id number]            Assessment & Plan:  Elza 78-year-old female, initially seen for symptoms of hiatal hernia with iron deficiency anemia and Nick's erosions now with second symptomatic episode of likely vagal response secondary to symptomatic hiatal hernia. 1 hiatal hernia: With Nick's ulcers and associated anemia now with symptomatic episode resulting in vagal symptoms  -Continue PPI therapy  -Normal esophageal manometry  -Patient with a mixed type hiatal hernia  Been seen by thoracic surgery, awaiting robotic repair of hiatal hernia which is appropriate given these 2 significant episodes of vagal response while overeating    2. Anemia: Resolved  -Continue PPI therapy    Follow-up in 1 year or as needed      Nakul was seen today for follow-up. Diagnoses and all orders for this visit:    Hiatal hernia  -     pantoprazole (PROTONIX) 40 mg tablet; Take 1 tablet (40 mg total) by mouth daily    Gastroesophageal reflux disease with esophagitis without hemorrhage    Esophageal dysphagia            _____________________________________________________________        CC: Follow-up    HPI:  Jim Stock is a 78 y. o.female who is here for follow-up.   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 Patient was placed on PPI therapy, many of her symptoms have improved. She notes that recently she was at her grandsons graduation, over 8 had a beer and then had episodes of dry heaving ultimately developed a syncopal episode requiring hospital evaluation. Similar symptoms happened about 2 years ago. It was felt that she may have had a vasovagal response due to symptomatic hiatal hernia. She has a known hiatal hernia, mixed type, was recently seen by thoracic surgery, had a normal esophageal manometry. They are anticipating robotic repair of hiatal hernia. Patient has been compliant with PPI therapy, no significant reflux symptoms. Her anemia has resolved and iron supplements are normal.    Patient reports regular bowel moods. She did lose a little bit of weight with this last episode, has just been eating smaller but more frequent meals. ROS:  The remainder of the ROS was negative except for the pertinent positives mentioned in HPI. Allergies:  Other    Medications:   Current Outpatient Medications:   •  Cholecalciferol (VITAMIN D3) 2000 units CHEW, Chew 2 each daily , Disp: , Rfl:   •  COD LIVER OIL PO, Take by mouth 1 tsp daily, Disp: , Rfl:   •  cyanocobalamin (VITAMIN B-12) 1000 MCG tablet, Take 1,000 mcg by mouth daily, Disp: , Rfl:   •  levothyroxine (Synthroid) 75 mcg tablet, Take 1 tablet (75 mcg total) by mouth daily, Disp: 90 tablet, Rfl: 1  •  mometasone (ELOCON) 0.1 % cream, Apply topically daily as needed (allergies), Disp: 45 g, Rfl: 1  •  pantoprazole (PROTONIX) 40 mg tablet, Take 1 tablet (40 mg total) by mouth daily, Disp: 90 tablet, Rfl: 3    Current Facility-Administered Medications:   •  cyanocobalamin injection 1,000 mcg, 1,000 mcg, Intramuscular, Q30 Days, Eloisa Wiggins MD, 1,000 mcg at 08/16/22 1110    Past Medical History:   Diagnosis Date   • LYLE (acute kidney injury) ([address]) 3/22/2021   • Dehydration after exertion 3/25/2021   • Disease of thyroid gland    • Hypertension    • Osteopenia     last assessed 12/17/13   • PONV (postoperative nausea and vomiting)    • Syncope and collapse 3/22/2021   • Vaginal atrophy        Past Surgical History:   Procedure Laterality Date   • COLONOSCOPY     • DILATION AND CURETTAGE OF UTERUS     • TUBAL LIGATION         Family History   Problem Relation Age of Onset   • Lymphoma Mother 79        acute   • Hyperlipidemia Father    • Peripheral vascular disease Father    • Breast cancer Sister    • Leukemia Brother    • Cancer Brother         leukemia   • Other Other         4 children living        reports that she quit smoking about 53 years ago. Her smoking use included cigarettes. She started smoking about 62 years ago. She has a 2.00 pack-year smoking history. She has never used smokeless tobacco. She reports that she does not currently use alcohol. She reports that she does not use drugs.       Physical Exam:    /82 (BP Location: Left arm, Patient Position: Sitting, Cuff Size: Adult)   Pulse 78   Ht 5' 3" (1.6 m)   Wt 62.6 kg (138 lb)   SpO2 98%   BMI 24.45 kg/m²     Gen: wn/wd, NAD, healthy-appearing female  HEENT: anicteric, MMM, no cervical LAD  CVS: RRR, no m/r/g  CHEST: CTA b/l  ABD: +BS, soft, NT,ND, no hepatosplenomegaly  EXT: no c/c/e  NEURO: aaox3  SKIN: NO rashes

## 2023-08-28 NOTE — PROGRESS NOTES
[facility]  John Ryan 78 y.o. female MRN: [id number]            Assessment & Plan:  Elza 38-year-old female, initially seen for symptoms of hiatal hernia with iron deficiency anemia and Nick's erosions now with second symptomatic episode of likely vagal response secondary to symptomatic hiatal hernia. 1 hiatal hernia: With Nick's ulcers and associated anemia now with symptomatic episode resulting in vagal symptoms  -Continue PPI therapy  -Normal esophageal manometry  -Patient with a mixed type hiatal hernia  Been seen by thoracic surgery, awaiting robotic repair of hiatal hernia which is appropriate given these 2 significant episodes of vagal response while overeating    2. Anemia: Resolved  -Continue PPI therapy    Follow-up in 1 year or as needed      Gerald Gimenez was seen today for follow-up. Diagnoses and all orders for this visit:    Hiatal hernia  -     pantoprazole (PROTONIX) 40 mg tablet; Take 1 tablet (40 mg total) by mouth daily    Gastroesophageal reflux disease with esophagitis without hemorrhage    Esophageal dysphagia            _____________________________________________________________        CC: Follow-up    HPI:  John Ryan is a 78 y. o.female who is here for follow-up. 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 Patient was placed on PPI therapy, many of her symptoms have improved. She notes that recently she was at her grandsons graduation, over 8 had a beer and then had episodes of dry heaving ultimately developed a syncopal episode requiring hospital evaluation. Similar symptoms happened about 2 years ago. It was felt that she may have had a vasovagal response due to symptomatic hiatal hernia.   She has a known hiatal hernia, mixed type, was recently seen by thoracic surgery, had a normal esophageal manometry. They are anticipating robotic repair of hiatal hernia. Patient has been compliant with PPI therapy, no significant reflux symptoms. Her anemia has resolved and iron supplements are normal.    Patient reports regular bowel moods. She did lose a little bit of weight with this last episode, has just been eating smaller but more frequent meals. ROS:  The remainder of the ROS was negative except for the pertinent positives mentioned in HPI. Allergies:  Other    Medications:   Current Outpatient Medications:   •  Cholecalciferol (VITAMIN D3) 2000 units CHEW, Chew 2 each daily , Disp: , Rfl:   •  COD LIVER OIL PO, Take by mouth 1 tsp daily, Disp: , Rfl:   •  cyanocobalamin (VITAMIN B-12) 1000 MCG tablet, Take 1,000 mcg by mouth daily, Disp: , Rfl:   •  levothyroxine (Synthroid) 75 mcg tablet, Take 1 tablet (75 mcg total) by mouth daily, Disp: 90 tablet, Rfl: 1  •  mometasone (ELOCON) 0.1 % cream, Apply topically daily as needed (allergies), Disp: 45 g, Rfl: 1  •  pantoprazole (PROTONIX) 40 mg tablet, Take 1 tablet (40 mg total) by mouth daily, Disp: 90 tablet, Rfl: 3    Current Facility-Administered Medications:   •  cyanocobalamin injection 1,000 mcg, 1,000 mcg, Intramuscular, Q30 Days, Emmett Galdamez MD, 1,000 mcg at 08/16/22 1110    Past Medical History:   Diagnosis Date   • LYLE (acute kidney injury) ([address]) 3/22/2021   • Dehydration after exertion 3/25/2021   • Disease of thyroid gland    • Hypertension    • Osteopenia     last assessed 12/17/13   • PONV (postoperative nausea and vomiting)    • Syncope and collapse 3/22/2021   • Vaginal atrophy        Past Surgical History:   Procedure Laterality Date   • COLONOSCOPY     • DILATION AND CURETTAGE OF UTERUS     • TUBAL LIGATION         Family History   Problem Relation Age of Onset   • Lymphoma Mother 79        acute   • Hyperlipidemia Father    • Peripheral vascular disease Father    • Breast cancer Sister    • Leukemia Brother • Cancer Brother         leukemia   • Other Other         4 children living        reports that she quit smoking about 53 years ago. Her smoking use included cigarettes. She started smoking about 62 years ago. She has a 2.00 pack-year smoking history. She has never used smokeless tobacco. She reports that she does not currently use alcohol. She reports that she does not use drugs.       Physical Exam:    /82 (BP Location: Left arm, Patient Position: Sitting, Cuff Size: Adult)   Pulse 78   Ht 5' 3" (1.6 m)   Wt 62.6 kg (138 lb)   SpO2 98%   BMI 24.45 kg/m²     Gen: wn/wd, NAD, healthy-appearing female  HEENT: anicteric, MMM, no cervical LAD  CVS: RRR, no m/r/g  CHEST: CTA b/l  ABD: +BS, soft, NT,ND, no hepatosplenomegaly  EXT: no c/c/e  NEURO: aaox3  SKIN: NO rashes

## 2023-08-29 ENCOUNTER — HOSPITAL ENCOUNTER (OUTPATIENT)
Dept: CT IMAGING | Facility: HOSPITAL | Age: 78
Discharge: HOME | End: 2023-08-29
Attending: INTERNAL MEDICINE
Payer: MEDICARE

## 2023-08-29 DIAGNOSIS — R91.1 LUNG NODULE SEEN ON IMAGING STUDY: ICD-10-CM

## 2023-08-29 PROCEDURE — 71250 CT THORAX DX C-: CPT

## 2023-08-29 PROCEDURE — G1004 CDSM NDSC: HCPCS

## 2023-09-01 ENCOUNTER — OFFICE VISIT (OUTPATIENT)
Dept: CARDIOLOGY CLINIC | Facility: CLINIC | Age: 78
End: 2023-09-01
Payer: MEDICARE

## 2023-09-01 VITALS
WEIGHT: 138 LBS | BODY MASS INDEX: 24.45 KG/M2 | SYSTOLIC BLOOD PRESSURE: 166 MMHG | DIASTOLIC BLOOD PRESSURE: 92 MMHG | HEART RATE: 74 BPM | HEIGHT: 63 IN

## 2023-09-01 DIAGNOSIS — R55 NEAR SYNCOPE: ICD-10-CM

## 2023-09-01 DIAGNOSIS — I10 HYPERTENSION, ESSENTIAL: Primary | ICD-10-CM

## 2023-09-01 DIAGNOSIS — I35.1 NONRHEUMATIC AORTIC VALVE INSUFFICIENCY: ICD-10-CM

## 2023-09-01 DIAGNOSIS — K44.9 HIATAL HERNIA: ICD-10-CM

## 2023-09-01 PROCEDURE — 99204 OFFICE O/P NEW MOD 45 MIN: CPT | Performed by: INTERNAL MEDICINE

## 2023-09-01 PROCEDURE — 93000 ELECTROCARDIOGRAM COMPLETE: CPT | Performed by: INTERNAL MEDICINE

## 2023-09-01 NOTE — PATIENT INSTRUCTIONS
You were seen today in the Cardiology office. Please have an echocardiogram performed to assess your heart pumping function and valves. If you're echocardiogram is unchanged from prior, no additional cardiac work up will be necessary prior to your planned surgery. Thank you for choosing [address] [place] [address].

## 2023-09-01 NOTE — PROGRESS NOTES
[facility]    CHIEF COMPLAINT:   Chief Complaint   Patient presents with   • New Patient Visit     No cardiac complaints        HPI:  Govind Wu is a 78 y.o. female with a past medical history of hypertension, GERD, hiatal hernia. She was seen in the emergency department at [facility] on 6/10/23 with dizziness, nausea, and near syncope. She was in her baseline stated of health prior to this. She had two beers with dinner and had been eating and drinking normally prior. She was at a graduation party and had been consuming more alcohol and food than she typically doses. She had some nausea and abdominal cramping which was followed by nausea, clamminess, lightheadedness. No preceding chest pain, palpitations, dyspnea. ECG NSR, possible LAE. Found to have low sodium 132 and bicarb 19. Elevated glucose levels. HS troponin 11 and 15 Treated with IVF. Felt improvement in symptoms. Apparently she has had similar episodes 3-4 times in the past. August 2021 during musik[place] - heat, overeating. Had feeling of fullness. Doing moderately intense activity. Walks every day about 12-13k steps. Does resistance bands and weights. No exertional symptoms. No fever, chills, fatigue, new visual changes, chest pain, palpitations, shortness of breath, orthopnea, PND, lower extremity swelling, leg claudication. Sleeps slightly elevated due to hernia.     The following portions of the patient's history were reviewed and updated as appropriate: allergies, current medications, past family history, past medical history, past social history, past surgical history, and problem list.    SINCE LAST OV I REVIEWED WITH THE PATIENT THE INTERIM LABS, TEST RESULTS, CONSULTANT(S) NOTES AND PERFORMED AN INTERIM REVIEW OF HISTORY    Past Medical History:   Diagnosis Date   • LYLE (acute kidney injury) ([address]) 3/22/2021   • Dehydration after exertion 3/25/2021   • Disease of thyroid gland    • Hypertension    • Osteopenia     last assessed 13   • PONV (postoperative nausea and vomiting)    • Syncope and collapse 3/22/2021   • Vaginal atrophy        Past Surgical History:   Procedure Laterality Date   • COLONOSCOPY     • DILATION AND CURETTAGE OF UTERUS     • GASTROPLASTY N/A 10/5/2023    Procedure: Tyra Coles;  Surgeon: Aleyda Cali MD;  Location: [facility];  Service: Thoracic   • VA ESOPHAGOGASTRODUODENOSCOPY TRANSORAL DIAGNOSTIC N/A 10/5/2023    Procedure: ESOPHAGOGASTRODUODENOSCOPY (EGD); Surgeon: Aleyda Cali MD;  Location: [facility];  Service: Thoracic   • VA LAPS RPR PARAESPHGL HRNA INCL FUNDPLSTY W/O MESH N/A 10/5/2023    Procedure: laparoscopic paraesophageal hernia repair, with mesh, fundoplication;  Surgeon: Aleyda Cali MD;  Location: [facility];  Service: Thoracic   • TUBAL LIGATION         Social History     Socioeconomic History   • Marital status:       Spouse name: Not on file   • Number of children: Not on file   • Years of education: Not on file   • Highest education level: Not on file   Occupational History   • Occupation:    Tobacco Use   • Smoking status: Former     Packs/day: 0.25     Years: 8.00     Total pack years: 2.00     Types: Cigarettes     Start date: 63     Quit date: 1970     Years since quittin.8   • Smokeless tobacco: Never   • Tobacco comments:     about 45 years ago    Vaping Use   • Vaping Use: Never used   Substance and Sexual Activity   • Alcohol use: Not Currently     Comment: rare occasion   • Drug use: No   • Sexual activity: Not Currently   Other Topics Concern   • Not on file   Social History Narrative    Travel    She enjoys cooking    Denied hx of exercise habits     Social Determinants of Health     Financial Resource Strain: Unknown (2023)    Overall Financial Resource Strain (CARDIA)    • Difficulty of Paying Living Expenses: Patient refused   Food Insecurity: No Food Insecurity (10/6/2023)    Hunger Vital Sign    • Worried About Running Out of Food in the Last Year: Never true    • Ran Out of Food in the Last Year: Never true   Transportation Needs: No Transportation Needs (10/6/2023)    PRAPARE - Transportation    • Lack of Transportation (Medical): No    • Lack of Transportation (Non-Medical): No   Physical Activity: Not on file   Stress: Not on file   Social Connections: Not on file   Intimate Partner Violence: Not on file   Housing Stability: Low Risk  (10/6/2023)    Housing Stability Vital Sign    • Unable to Pay for Housing in the Last Year: No    • Number of Places Lived in the Last Year: 1    • Unstable Housing in the Last Year: No       Family History   Problem Relation Age of Onset   • Lymphoma Mother 79        acute   • Hyperlipidemia Father    • Peripheral vascular disease Father    • Breast cancer Sister    • Leukemia Brother    • Cancer Brother         leukemia   • Other Other         4 children living       Allergies   Allergen Reactions   • Other Allergic Rhinitis     Seasonal allergies        Current Outpatient Medications   Medication Sig Dispense Refill   • Cholecalciferol (VITAMIN D3) 2000 units CHEW Chew 2 each daily      • COD LIVER OIL PO Take by mouth 1 tsp daily     • cyanocobalamin (VITAMIN B-12) 1000 MCG tablet Take 1,000 mcg by mouth daily     • levothyroxine (Synthroid) 75 mcg tablet Take 1 tablet (75 mcg total) by mouth daily 90 tablet 1   • mometasone (ELOCON) 0.1 % cream Apply topically daily as needed (allergies) 45 g 1   • pantoprazole (PROTONIX) 40 mg tablet Take 1 tablet (40 mg total) by mouth daily 7 tablet 0   • pantoprazole (PROTONIX) 40 mg tablet Take 1 tablet (40 mg total) by mouth daily 60 tablet 0     No current facility-administered medications for this visit.        /92 (BP Location: Left arm, Patient Position: Sitting)   Pulse 74   Ht 5' 3" (1.6 m)   Wt 62.6 kg (138 lb)   BMI 24.45 kg/m²     Review of Systems   All other systems reviewed and are negative. Physical Exam  Vitals reviewed. Constitutional:       General: She is not in acute distress. Appearance: She is well-developed. HENT:      Head: Normocephalic and atraumatic. Eyes:      General: No scleral icterus. Extraocular Movements: Extraocular movements intact. Conjunctiva/sclera: Conjunctivae normal.   Cardiovascular:      Rate and Rhythm: Normal rate and regular rhythm. Heart sounds: Normal heart sounds. No murmur heard. No gallop. Pulmonary:      Effort: Pulmonary effort is normal. No respiratory distress. Breath sounds: Normal breath sounds. No wheezing or rales. Abdominal:      General: Abdomen is flat. Bowel sounds are normal. There is no distension. Palpations: Abdomen is soft. Tenderness: There is no abdominal tenderness. Musculoskeletal:      Right lower leg: No edema. Left lower leg: No edema. Skin:     General: Skin is warm and dry. Capillary Refill: Capillary refill takes less than 2 seconds. Coloration: Skin is not jaundiced or pale. Neurological:      Mental Status: She is alert.    Psychiatric:         Mood and Affect: Mood normal.          Lab Results   Component Value Date     08/20/2015    K 4.1 10/06/2023     10/06/2023    CO2 27 10/06/2023    BUN 9 10/06/2023    CREATININE 0.66 10/06/2023    GLUCOSE 106 08/13/2016    CALCIUM 8.2 (L) 10/06/2023    ALT 28 08/12/2022    AST 29 08/12/2022    INR 1.10 10/05/2023       Lab Results   Component Value Date    CHOL 269 08/20/2015     08/12/2022    LDLCALC 121 (H) 08/12/2022    TRIG 54 08/12/2022       Lab Results   Component Value Date    WBC 9.92 10/06/2023    HGB 12.7 10/06/2023    HCT 38.0 10/06/2023     10/06/2023       Lab Results   Component Value Date     08/11/2023    HGBA1C 5.7 (H) 08/11/2023       Cardiac studies:   Results for orders placed or performed in visit on 09/01/23   POCT ECG    Impression    Normal sinus rhythm       CT coronary calcium score - 9/27/22: IMPRESSION: Total coronary calcium score equals 0    TTE - 3/23/21:  LEFT VENTRICLE: Size was normal. Systolic function was normal. Ejection fraction was estimated to be 60 %. There were no regional wall motion abnormalities. Wall thickness was normal. DOPPLER: Left ventricular diastolic function parameters  were normal.     RIGHT VENTRICLE: The size was normal. Systolic function was normal. Wall thickness was normal.     LEFT ATRIUM: Size was normal.     RIGHT ATRIUM: Size was normal.     MITRAL VALVE: Valve structure was normal. There was normal leaflet separation. DOPPLER: The transmitral velocity was within the normal range. There was no evidence for stenosis. There was trace regurgitation.     AORTIC VALVE: The valve was trileaflet. Leaflets exhibited normal thickness and normal cuspal separation. DOPPLER: Transaortic velocity was within the normal range. There was no evidence for stenosis. There was moderate regurgitation.     TRICUSPID VALVE: The valve structure was normal. There was normal leaflet separation. DOPPLER: The transtricuspid velocity was within the normal range. There was no evidence for stenosis. There was mild regurgitation. Estimated peak PA  pressure was 40 mmHg. The findings suggest mild pulmonary hypertension.     PULMONIC VALVE: Leaflets exhibited normal thickness, no calcification, and normal cuspal separation. DOPPLER: The transpulmonic velocity was within the normal range. There was no significant regurgitation.     PERICARDIUM: There was no pericardial effusion. The pericardium was normal in appearance.     AORTA: The root exhibited normal size.     SYSTEMIC VEINS: IVC: The inferior vena cava was mildly dilated. Respirophasic changes were normal.       ASSESSMENT AND PLAN:  Gabriel Ferrari was seen today for new patient visit. Diagnoses and all orders for this visit:    Hypertension, essential: Blood pressure was elevated today.  She had been on Candesartan and the past but it was discontinued during an ED visit back in August 2021. She tried it again at a lower dose but didn't tolerate due to side effects? Consider amlodipine. Near syncope: Seems to be in the setting of overeating and alcohol use. Possibly some volume depletion at the time. Last episode like this was in August 2021 at [facility] while she was out in the heat and also consumed a lot of food. Avoid prolonged standing, hot environments, increase fluid intake. -     Ambulatory Referral to Cardiology  -     POCT ECG    Nonrheumatic aortic valve insufficiency: Last echocardiogram with moderate AI. Will repeat a  echo now that it has been > 2 years to assess AR and pulm HTN.  -     Echo complete w/ contrast if indicated;  Future    Carolina Forward, MD

## 2023-09-07 ENCOUNTER — HOSPITAL ENCOUNTER (OUTPATIENT)
Dept: NON INVASIVE DIAGNOSTICS | Facility: CLINIC | Age: 78
Discharge: HOME | End: 2023-09-07
Payer: MEDICARE

## 2023-09-07 VITALS
HEART RATE: 78 BPM | SYSTOLIC BLOOD PRESSURE: 166 MMHG | HEIGHT: 63 IN | WEIGHT: 138 LBS | DIASTOLIC BLOOD PRESSURE: 92 MMHG | BODY MASS INDEX: 24.45 KG/M2

## 2023-09-07 DIAGNOSIS — I35.1 NONRHEUMATIC AORTIC VALVE INSUFFICIENCY: ICD-10-CM

## 2023-09-07 LAB
APICAL FOUR CHAMBER EJECTION FRACTION: 53 %
AV REGURGITATION PRESSURE HALF TIME: 515 MS
E WAVE DECELERATION TIME: 176 MS
LAAS-AP2: 19.1 CM2
LAAS-AP4: 14.8 CM2
LEFT ATRIUM VOLUME (MOD BIPLANE): 48 ML
MV E'TISSUE VEL-SEP: 8 CM/S
MV PEAK A VEL: 0.73 M/S
MV PEAK E VEL: 55 CM/S
MV STENOSIS PRESSURE HALF TIME: 51 MS
MV VALVE AREA P 1/2 METHOD: 4.31 CM2
RIGHT ATRIUM AREA SYSTOLE A4C: 14.7 CM2
RIGHT VENTRICLE ID DIMENSION: 2.9 CM
SL CV AV DECELERATION TIME RETROGRADE: 1775 MS
SL CV AV PEAK GRADIENT RETROGRADE: 73 MMHG
SL CV LEFT ATRIUM LENGTH A2C: 5.1 CM
TR MAX PG: 24 MMHG
TR PEAK VELOCITY: 2.5 M/S
TRICUSPID ANNULAR PLANE SYSTOLIC EXCURSION: 1.9 CM
TRICUSPID VALVE PEAK REGURGITATION VELOCITY: 2.46 M/S

## 2023-09-07 PROCEDURE — 93306 TTE W/DOPPLER COMPLETE: CPT | Performed by: INTERNAL MEDICINE

## 2023-09-07 PROCEDURE — 93306 TTE W/DOPPLER COMPLETE: CPT

## 2023-09-08 ENCOUNTER — TELEPHONE (OUTPATIENT)
Age: 78
End: 2023-09-08

## 2023-09-11 ENCOUNTER — PATIENT MESSAGE (OUTPATIENT)
Age: 78
End: 2023-09-11

## 2023-09-11 DIAGNOSIS — R55 VASOVAGAL NEAR-SYNCOPE: Primary | ICD-10-CM

## 2023-09-11 NOTE — H&P (VIEW-ONLY)
Thoracic Follow-Up  Assessment/Plan:    Paraesophageal hernia  Ms. Stephanie Blanco has a symptomatic large paraesophageal hernia. Her esophageal function is normal.  She is interested in proceeding with a EGD, laparoscopic paraesophageal hernia repair, possible mesh, possible Aly gastroplasty, and fundoplication. I, personally described the risks and benefits associated with this procedure including but not limited to bleeding, infection, and injury to adjacent tissues including the stomach, vagus nerves, and esophagus. Diagnoses and all orders for this visit:    Paraesophageal hernia  -     Case request operating room: REPAIR HERNIA PARAESOPHAGEAL  LAPAROSCOPIC  POSSIBLE ALY GASTROPLASTY  FUNDOPLICATION, ESOPHAGOGASTRODUODENOSCOPY (EGD); Standing  -     Case request operating room: REPAIR HERNIA PARAESOPHAGEAL  LAPAROSCOPIC  POSSIBLE ALY GASTROPLASTY  FUNDOPLICATION, ESOPHAGOGASTRODUODENOSCOPY (EGD)    Other orders  -     Diet NPO; Sips with meds; Standing  -     Nursing communication Please give pre-op Carbohydrate drink to patient 2-4 hours prior to surgery; Standing  -     Void on call to OR; Standing  -     Insert peripheral IV; Standing  -     Place sequential compression device; Standing  -     Protime-INR; Standing  -     APTT; Standing  -     ceFAZolin (ANCEF) IVPB (premix in dextrose) 2,000 mg 50 mL  -     metroNIDAZOLE (FLAGYL) IVPB (premix) 500 mg 100 mL          Thoracic History        Diagnosis:  hiatal hernia   Procedures/Surgeries:     Pathology:     Adjuvant Therapy:            Subjective:    Patient ID: Miguel Mondragon is a 78 y.o. female. HPI  Ms Stephanie Blanco is an 78year old female who is being evaluated for a hiatal hernia. She underwent EGD on 2/14/23 that showed a medium sliding hiatal hernia without Kimmy Back lesions present. Upper GI on 5/5/2023 that showed a type 3 hiatal hernia with both sliding and paraesophageal component with mild to moderate gastroesophageal reflux.  She was last seen in our office on 7/11/2023 at which point she was interested in surgical repair and we recommended esophageal manometry and cardiac risk assessment. Esophageal manometry on 7/25/2023 revealed 9/10 swallows with normal esophageal contractile pattern, mean DCI 1205, 1 out of 10 swallows with weak esophageal contractility pattern. LES-median IRP within normal lumits. Imepdance- 70% complete clearance of liquid bolus. GEJ relatively to LES measures 4cm. Rapid swallow index is less than 1. [place] classification- normal     She had an echocardiogram on September 7, 2023. This demonstrates an ejection fraction of 60% with normal wall motion. She had mild to moderate aortic regurgitation and mild mitral regurgitation. RV systolic pressure was normal.      On discussion she has early satiety. She is on protonix 40mg QD and has made diet changes with small, more frequent meals. She had an episode when she overate a large meal and had syncopal episode. She has noted worsening of her early satiety and has had a 5lb weight loss in the last few months. She denies heartburn, dysphagia, nausea, vomiting, regurgitation, chest pain or SOB. The following portions of the patient's history were reviewed and updated as appropriate: allergies, current medications, past family history, past medical history, past social history, past surgical history and problem list.    Review of Systems   Constitutional: Negative for activity change, appetite change, chills, fever and unexpected weight change. HENT: Negative for voice change. Respiratory: Negative for cough, shortness of breath and wheezing. Cardiovascular: Negative for chest pain, palpitations and leg swelling. Gastrointestinal: Negative for abdominal pain, constipation, diarrhea, nausea and vomiting. Musculoskeletal: Negative for arthralgias and myalgias. Skin: Negative for rash. Neurological: Negative for dizziness, seizures, weakness, numbness and headaches. Hematological: Negative for adenopathy. Psychiatric/Behavioral: Negative for confusion. Objective:   Physical Exam  Constitutional:       Appearance: She is well-developed. HENT:      Head: Normocephalic and atraumatic. Mouth/Throat:      Pharynx: No oropharyngeal exudate. Eyes:      General: No scleral icterus. Conjunctiva/sclera: Conjunctivae normal.      Pupils: Pupils are equal, round, and reactive to light. Neck:      Thyroid: No thyromegaly. Trachea: No tracheal deviation. Cardiovascular:      Rate and Rhythm: Normal rate and regular rhythm. Heart sounds: Normal heart sounds. Pulmonary:      Effort: Pulmonary effort is normal. No respiratory distress. Breath sounds: Normal breath sounds. No wheezing. Abdominal:      General: There is no distension. Palpations: Abdomen is soft. Tenderness: There is no abdominal tenderness. Musculoskeletal:         General: Normal range of motion. Cervical back: Normal range of motion and neck supple. Lymphadenopathy:      Cervical: No cervical adenopathy. Skin:     General: Skin is warm and dry. Neurological:      Mental Status: She is alert and oriented to person, place, and time. Psychiatric:         Behavior: Behavior normal.         Thought Content: Thought content normal.         Judgment: Judgment normal.     /90 (BP Location: Left arm, Patient Position: Sitting, Cuff Size: Standard)   Pulse 85   Temp 98 °F (36.7 °C) (Temporal)   Resp 15   Ht 5' 3" (1.6 m)   Wt 62.4 kg (137 lb 9.1 oz)   SpO2 98%   BMI 24.37 kg/m²     XR CHEST PA & LATERAL    Result Date: 6/10/2023  Impression IMPRESSION: No pneumonia or CHF detected. Workstation:[id number]     Barium swallow performed on May 5, 2023 is personally reviewed. FINDINGS:     The esophagus is normal in caliber. Esophageal motility is normal and emptying of contrast from the esophagus is prompt when the patient is upright.  There are mild diffuse tertiary contractions and mild delayed emptying of the esophagus when the patient   is recumbent. There is no mucosal mass, ulceration or fold thickening identified.     The stomach is unremarkable in size. The gastric mucosa is normal.  No penetrating ulcers or masses.     Contrast empties promptly into the duodenum. The duodenum is normal in caliber. The ligament of Treitz/duodenojejunal junction lies in a normal position.     Mild to moderate gastroesophageal reflux.     Moderate mixed type hiatal hernia.     IMPRESSION:     Moderate mixed type hiatal hernia with mild to moderate gastroesophageal reflux.     Mild esophageal dysmotility when the patient is recumbent. Normal esophageal motility when the patient is upright.

## 2023-09-11 NOTE — TELEPHONE ENCOUNTER
PA for pantoprazole sent through [employer]  Answered questions and sent clinicals  Awaiting determination

## 2023-09-11 NOTE — PROGRESS NOTES
Thoracic Follow-Up  Assessment/Plan:    Paraesophageal hernia  Ms. Shiraz Nath has a symptomatic large paraesophageal hernia. Her esophageal function is normal.  She is interested in proceeding with a EGD, laparoscopic paraesophageal hernia repair, possible mesh, possible Aly gastroplasty, and fundoplication. I, personally described the risks and benefits associated with this procedure including but not limited to bleeding, infection, and injury to adjacent tissues including the stomach, vagus nerves, and esophagus. Diagnoses and all orders for this visit:    Paraesophageal hernia  -     Case request operating room: REPAIR HERNIA PARAESOPHAGEAL  LAPAROSCOPIC  POSSIBLE ALY GASTROPLASTY  FUNDOPLICATION, ESOPHAGOGASTRODUODENOSCOPY (EGD); Standing  -     Case request operating room: REPAIR HERNIA PARAESOPHAGEAL  LAPAROSCOPIC  POSSIBLE ALY GASTROPLASTY  FUNDOPLICATION, ESOPHAGOGASTRODUODENOSCOPY (EGD)    Other orders  -     Diet NPO; Sips with meds; Standing  -     Nursing communication Please give pre-op Carbohydrate drink to patient 2-4 hours prior to surgery; Standing  -     Void on call to OR; Standing  -     Insert peripheral IV; Standing  -     Place sequential compression device; Standing  -     Protime-INR; Standing  -     APTT; Standing  -     ceFAZolin (ANCEF) IVPB (premix in dextrose) 2,000 mg 50 mL  -     metroNIDAZOLE (FLAGYL) IVPB (premix) 500 mg 100 mL          Thoracic History        Diagnosis:  hiatal hernia   Procedures/Surgeries:     Pathology:     Adjuvant Therapy:            Subjective:    Patient ID: Lizabeth Monique is a 78 y.o. female. HPI  Ms Shiraz Nath is an 78year old female who is being evaluated for a hiatal hernia. She underwent EGD on 2/14/23 that showed a medium sliding hiatal hernia without Clayton Reasoner lesions present. Upper GI on 5/5/2023 that showed a type 3 hiatal hernia with both sliding and paraesophageal component with mild to moderate gastroesophageal reflux.  She was last seen in our office on 7/11/2023 at which point she was interested in surgical repair and we recommended esophageal manometry and cardiac risk assessment. Esophageal manometry on 7/25/2023 revealed 9/10 swallows with normal esophageal contractile pattern, mean DCI 1205, 1 out of 10 swallows with weak esophageal contractility pattern. LES-median IRP within normal lumits. Imepdance- 70% complete clearance of liquid bolus. GEJ relatively to LES measures 4cm. Rapid swallow index is less than 1. [place] classification- normal     She had an echocardiogram on September 7, 2023. This demonstrates an ejection fraction of 60% with normal wall motion. She had mild to moderate aortic regurgitation and mild mitral regurgitation. RV systolic pressure was normal.      On discussion she has early satiety. She is on protonix 40mg QD and has made diet changes with small, more frequent meals. She had an episode when she overate a large meal and had syncopal episode. She has noted worsening of her early satiety and has had a 5lb weight loss in the last few months. She denies heartburn, dysphagia, nausea, vomiting, regurgitation, chest pain or SOB. The following portions of the patient's history were reviewed and updated as appropriate: allergies, current medications, past family history, past medical history, past social history, past surgical history and problem list.    Review of Systems   Constitutional: Negative for activity change, appetite change, chills, fever and unexpected weight change. HENT: Negative for voice change. Respiratory: Negative for cough, shortness of breath and wheezing. Cardiovascular: Negative for chest pain, palpitations and leg swelling. Gastrointestinal: Negative for abdominal pain, constipation, diarrhea, nausea and vomiting. Musculoskeletal: Negative for arthralgias and myalgias. Skin: Negative for rash. Neurological: Negative for dizziness, seizures, weakness, numbness and headaches. Hematological: Negative for adenopathy. Psychiatric/Behavioral: Negative for confusion. Objective:   Physical Exam  Constitutional:       Appearance: She is well-developed. HENT:      Head: Normocephalic and atraumatic. Mouth/Throat:      Pharynx: No oropharyngeal exudate. Eyes:      General: No scleral icterus. Conjunctiva/sclera: Conjunctivae normal.      Pupils: Pupils are equal, round, and reactive to light. Neck:      Thyroid: No thyromegaly. Trachea: No tracheal deviation. Cardiovascular:      Rate and Rhythm: Normal rate and regular rhythm. Heart sounds: Normal heart sounds. Pulmonary:      Effort: Pulmonary effort is normal. No respiratory distress. Breath sounds: Normal breath sounds. No wheezing. Abdominal:      General: There is no distension. Palpations: Abdomen is soft. Tenderness: There is no abdominal tenderness. Musculoskeletal:         General: Normal range of motion. Cervical back: Normal range of motion and neck supple. Lymphadenopathy:      Cervical: No cervical adenopathy. Skin:     General: Skin is warm and dry. Neurological:      Mental Status: She is alert and oriented to person, place, and time. Psychiatric:         Behavior: Behavior normal.         Thought Content: Thought content normal.         Judgment: Judgment normal.     /90 (BP Location: Left arm, Patient Position: Sitting, Cuff Size: Standard)   Pulse 85   Temp 98 °F (36.7 °C) (Temporal)   Resp 15   Ht 5' 3" (1.6 m)   Wt 62.4 kg (137 lb 9.1 oz)   SpO2 98%   BMI 24.37 kg/m²     XR CHEST PA & LATERAL    Result Date: 6/10/2023  Impression IMPRESSION: No pneumonia or CHF detected. Workstation:[id number]     Barium swallow performed on May 5, 2023 is personally reviewed. FINDINGS:     The esophagus is normal in caliber. Esophageal motility is normal and emptying of contrast from the esophagus is prompt when the patient is upright.  There are mild diffuse tertiary contractions and mild delayed emptying of the esophagus when the patient   is recumbent. There is no mucosal mass, ulceration or fold thickening identified.     The stomach is unremarkable in size. The gastric mucosa is normal.  No penetrating ulcers or masses.     Contrast empties promptly into the duodenum. The duodenum is normal in caliber. The ligament of Treitz/duodenojejunal junction lies in a normal position.     Mild to moderate gastroesophageal reflux.     Moderate mixed type hiatal hernia.     IMPRESSION:     Moderate mixed type hiatal hernia with mild to moderate gastroesophageal reflux.     Mild esophageal dysmotility when the patient is recumbent. Normal esophageal motility when the patient is upright.

## 2023-09-12 ENCOUNTER — OFFICE VISIT (OUTPATIENT)
Dept: CARDIAC SURGERY | Facility: CLINIC | Age: 78
End: 2023-09-12
Payer: MEDICARE

## 2023-09-12 VITALS
WEIGHT: 137.57 LBS | DIASTOLIC BLOOD PRESSURE: 90 MMHG | OXYGEN SATURATION: 98 % | RESPIRATION RATE: 15 BRPM | SYSTOLIC BLOOD PRESSURE: 150 MMHG | HEART RATE: 85 BPM | TEMPERATURE: 98 F | BODY MASS INDEX: 24.38 KG/M2 | HEIGHT: 63 IN

## 2023-09-12 DIAGNOSIS — K44.9 PARAESOPHAGEAL HERNIA: Primary | ICD-10-CM

## 2023-09-12 PROCEDURE — 99213 OFFICE O/P EST LOW 20 MIN: CPT | Performed by: THORACIC SURGERY (CARDIOTHORACIC VASCULAR SURGERY)

## 2023-09-12 RX ORDER — CEFAZOLIN SODIUM 2 G/50ML
2000 SOLUTION INTRAVENOUS ONCE
OUTPATIENT
Start: 2023-09-12 | End: 2023-09-12

## 2023-09-12 RX ORDER — METRONIDAZOLE 500 MG/100ML
500 INJECTION, SOLUTION INTRAVENOUS ONCE
OUTPATIENT
Start: 2023-09-12 | End: 2023-09-12

## 2023-09-12 NOTE — TELEPHONE ENCOUNTER
Prior authorization approved  Case ID: [id number]    Payer:  [facility] Noel German Banerjee    [phone]     [phone]   Your PA request has been approved. Paul Rob information will be provided in the approval communication. (Message [phone])  Approval Details      Authorized from August 12, 2023 to September 10, 2024    Electronic appeal:  Not supported  View History    Notes     Time User Attachment   Attachment received from payer.  9/11/2023  7:12 PM Interface, Norah Rta In Electronic Prior Authorization Attachment - Document     Medication Being Authorized     pantoprazole (PROTONIX) 40 mg tablet    Approval letter scanned in media  Advised pt via [employer]

## 2023-09-12 NOTE — ASSESSMENT & PLAN NOTE
Ms. Stella Habermann has a symptomatic large paraesophageal hernia. Her esophageal function is normal.  She is interested in proceeding with a EGD, laparoscopic paraesophageal hernia repair, possible mesh, possible Daniel gastroplasty, and fundoplication. I, personally described the risks and benefits associated with this procedure including but not limited to bleeding, infection, and injury to adjacent tissues including the stomach, vagus nerves, and esophagus.

## 2023-09-13 NOTE — PRE-PROCEDURE INSTRUCTIONS
Pre-Surgery Instructions:   Medication Instructions   • Cholecalciferol (VITAMIN D3) 2000 units CHEW Stop taking 7 days prior to surgery. • COD LIVER OIL PO Stop taking 7 days prior to surgery. • cyanocobalamin (VITAMIN B-12) 1000 MCG tablet Stop taking 7 days prior to surgery. • levothyroxine (Synthroid) 75 mcg tablet Take day of surgery. • mometasone (ELOCON) 0.1 % cream Hold day of surgery. • pantoprazole (PROTONIX) 40 mg tablet Take day of surgery. Medication instructions for day surgery reviewed. Please use only a sip of water to take your instructed medications. Avoid all over the counter vitamins, supplements and NSAIDS for one week prior to surgery per anesthesia guidelines. Tylenol is ok to take as needed. You will receive a call one business day prior to surgery with an arrival time and hospital directions. If your surgery is scheduled on a Monday, the hospital will be calling you on the Friday prior to your surgery. If you have not heard from anyone by 8pm, please call the hospital supervisor through the hospital  at [phone]. Brandin Lara [phone]). Do not eat or drink anything after midnight the night before your surgery, including candy, mints, lifesavers, or chewing gum. Do not drink alcohol 24hrs before your surgery. Try not to smoke at least 24hrs before your surgery. Follow the pre surgery showering instructions as listed in the [facility] Surgical Experience Booklet” or otherwise provided by your surgeon's office. Do not shave the surgical area 24 hours before surgery. Do not apply any lotions, creams, including makeup, cologne, deodorant, or perfumes after showering on the day of your surgery. No contact lenses, eye make-up, or artificial eyelashes. Remove nail polish, including gel polish, and any artificial, gel, or acrylic nails if possible. Remove all jewelry including rings and body piercing jewelry. Wear causal clothing that is easy to take on and off. Consider your type of surgery. Keep any valuables, jewelry, piercings at home. Please bring any specially ordered equipment (sling, braces) if indicated. Arrange for a responsible person to drive you to and from the hospital on the day of your surgery. Visitor Guidelines discussed. Call the surgeon's office with any new illnesses, exposures, or additional questions prior to surgery. Please reference your [facility] Surgical Experience Booklet” for additional information to prepare for your upcoming surgery.

## 2023-09-14 DIAGNOSIS — K44.9 HIATAL HERNIA: ICD-10-CM

## 2023-09-14 RX ORDER — PANTOPRAZOLE SODIUM 40 MG/1
40 TABLET, DELAYED RELEASE ORAL DAILY
Qty: 7 TABLET | Refills: 0 | Status: SHIPPED | OUTPATIENT
Start: 2023-09-14

## 2023-09-14 NOTE — TELEPHONE ENCOUNTER
Reason for call:   [x] Refill   [] Prior Auth  [] Other:     Office:   [] PCP/Provider -   [x] Speciality/Provider - [facility]  Medication: Pantoprazole   Dose/Frequency: 40 mg - Daily    Quantity: 7    Ph[facility] [address] [place] [address] [phone]    Does the patient have enough for 3 days? [x] Yes   [] No - Send as HP to POD    Is the patient having symptoms? [x] No   [] Yes -     Patient's daughter called asking if we can send over a 7 day supply to local pharmacy so they can get a hold over supply until Mail order gets there.

## 2023-09-15 ENCOUNTER — TELEPHONE (OUTPATIENT)
Age: 78
End: 2023-09-15

## 2023-09-15 NOTE — TELEPHONE ENCOUNTER
Hi, this is  Soup. I am calling about my mom. Her name is Hamida Morillo. Hamida Morillo, I'm sorry. My daughter's name is Neo Amezcua and her date of birth is 04/[birth year]43 and I'm calling about a prescription for compression stockings. That because I had done some messaging back and forth on the portal on the my chart and I guess the prescription is being mailed to my mom but we haven't seen it yet but I end anyway. I was calling around the [facility] and [facility] and it turned out that [facility] is saying they need. Ideally they would have the prescription put through directly from the doctor onto their portal. They won't even talk to us about scheduling an appointment until they have that on the portal. Otherwise I have to drive it in to their office to just get them to help us schedule an appointment. So they said if you, if the doctor's office can put the prescription for the compression stockings onto the portal, the portal address is D as in dog, M as in [employer], E as in [email or web address], so [email or web address] [email or web address] and then they will then call us and work with us to get an appointment scheduled. We did also try [address], but they're not scheduling anything until into November at this point. So we thought we would try [facility]. But at any rate, we also have not seen the script in the mail anyway, so I'm just wondering if you could put it through this portal. My number here is [phone] if you can let me know or send a message on the portal as well. And I as long as I know that it's going over to [facility], that would be great. Thank you so much.  angelo Montgomery.

## 2023-09-15 NOTE — TELEPHONE ENCOUNTER
Spoke to michael faxed script over to [facility] [phone]. Not sure if this needs a parachute order?  Please advise

## 2023-09-18 ENCOUNTER — TELEPHONE (OUTPATIENT)
Age: 78
End: 2023-09-18

## 2023-10-04 ENCOUNTER — ANESTHESIA EVENT (OUTPATIENT)
Dept: PERIOP | Facility: HOSPITAL | Age: 78
DRG: 327 | End: 2023-10-04
Payer: MEDICARE

## 2023-10-05 ENCOUNTER — HOSPITAL ENCOUNTER (INPATIENT)
Facility: HOSPITAL | Age: 78
LOS: 1 days | Discharge: HOME | DRG: 327 | End: 2023-10-06
Attending: THORACIC SURGERY (CARDIOTHORACIC VASCULAR SURGERY) | Admitting: THORACIC SURGERY (CARDIOTHORACIC VASCULAR SURGERY)
Payer: MEDICARE

## 2023-10-05 ENCOUNTER — APPOINTMENT (INPATIENT)
Dept: RADIOLOGY | Facility: HOSPITAL | Age: 78
DRG: 327 | End: 2023-10-05
Payer: MEDICARE

## 2023-10-05 ENCOUNTER — ANESTHESIA (OUTPATIENT)
Dept: PERIOP | Facility: HOSPITAL | Age: 78
DRG: 327 | End: 2023-10-05
Payer: MEDICARE

## 2023-10-05 DIAGNOSIS — K44.9 PARAESOPHAGEAL HERNIA: ICD-10-CM

## 2023-10-05 DIAGNOSIS — K21.00 GASTROESOPHAGEAL REFLUX DISEASE WITH ESOPHAGITIS WITHOUT HEMORRHAGE: Primary | ICD-10-CM

## 2023-10-05 LAB
ABO GROUP BLD: NORMAL
ANION GAP SERPL CALCULATED.3IONS-SCNC: 10 MMOL/L
APTT PPP: 29 SECONDS (ref 23–37)
BLD GP AB SCN SERPL QL: NEGATIVE
BUN SERPL-MCNC: 12 MG/DL (ref 5–25)
CALCIUM SERPL-MCNC: 8.7 MG/DL (ref 8.4–10.2)
CHLORIDE SERPL-SCNC: 107 MMOL/L (ref 96–108)
CO2 SERPL-SCNC: 22 MMOL/L (ref 21–32)
CREAT SERPL-MCNC: 0.68 MG/DL (ref 0.6–1.3)
ERYTHROCYTE [DISTWIDTH] IN BLOOD BY AUTOMATED COUNT: 13.2 % (ref 11.6–15.1)
GFR SERPL CREATININE-BSD FRML MDRD: 82 ML/MIN/1.73SQ M
GLUCOSE SERPL-MCNC: 182 MG/DL (ref 65–140)
HCT VFR BLD AUTO: 39.5 % (ref 34.8–46.1)
HGB BLD-MCNC: 12.7 G/DL (ref 11.5–15.4)
INR PPP: 1.1 (ref 0.84–1.19)
MAGNESIUM SERPL-MCNC: 1.8 MG/DL (ref 1.9–2.7)
MCH RBC QN AUTO: 32.2 PG (ref 26.8–34.3)
MCHC RBC AUTO-ENTMCNC: 32.2 G/DL (ref 31.4–37.4)
MCV RBC AUTO: 100 FL (ref 82–98)
PLATELET # BLD AUTO: 205 THOUSANDS/UL (ref 149–390)
PMV BLD AUTO: 9.9 FL (ref 8.9–12.7)
POTASSIUM SERPL-SCNC: 4.3 MMOL/L (ref 3.5–5.3)
PROTHROMBIN TIME: 14.1 SECONDS (ref 11.6–14.5)
RBC # BLD AUTO: 3.94 MILLION/UL (ref 3.81–5.12)
RH BLD: POSITIVE
SODIUM SERPL-SCNC: 139 MMOL/L (ref 135–147)
SPECIMEN EXPIRATION DATE: NORMAL
WBC # BLD AUTO: 9.79 THOUSAND/UL (ref 4.31–10.16)

## 2023-10-05 PROCEDURE — 43283 LAP ESOPH LENGTHENING: CPT | Performed by: THORACIC SURGERY (CARDIOTHORACIC VASCULAR SURGERY)

## 2023-10-05 PROCEDURE — 85610 PROTHROMBIN TIME: CPT | Performed by: THORACIC SURGERY (CARDIOTHORACIC VASCULAR SURGERY)

## 2023-10-05 PROCEDURE — 43282 LAP PARAESOPH HER RPR W/MESH: CPT | Performed by: THORACIC SURGERY (CARDIOTHORACIC VASCULAR SURGERY)

## 2023-10-05 PROCEDURE — 83735 ASSAY OF MAGNESIUM: CPT | Performed by: PHYSICIAN ASSISTANT

## 2023-10-05 PROCEDURE — 85730 THROMBOPLASTIN TIME PARTIAL: CPT | Performed by: THORACIC SURGERY (CARDIOTHORACIC VASCULAR SURGERY)

## 2023-10-05 PROCEDURE — C1781 MESH (IMPLANTABLE): HCPCS | Performed by: THORACIC SURGERY (CARDIOTHORACIC VASCULAR SURGERY)

## 2023-10-05 PROCEDURE — 86900 BLOOD TYPING SEROLOGIC ABO: CPT | Performed by: THORACIC SURGERY (CARDIOTHORACIC VASCULAR SURGERY)

## 2023-10-05 PROCEDURE — 88307 TISSUE EXAM BY PATHOLOGIST: CPT | Performed by: STUDENT IN AN ORGANIZED HEALTH CARE EDUCATION/TRAINING PROGRAM

## 2023-10-05 PROCEDURE — 86850 RBC ANTIBODY SCREEN: CPT | Performed by: THORACIC SURGERY (CARDIOTHORACIC VASCULAR SURGERY)

## 2023-10-05 PROCEDURE — 85027 COMPLETE CBC AUTOMATED: CPT | Performed by: PHYSICIAN ASSISTANT

## 2023-10-05 PROCEDURE — 0BUT4JZ SUPPLEMENT DIAPHRAGM WITH SYNTHETIC SUBSTITUTE, PERCUTANEOUS ENDOSCOPIC APPROACH: ICD-10-PCS | Performed by: THORACIC SURGERY (CARDIOTHORACIC VASCULAR SURGERY)

## 2023-10-05 PROCEDURE — 80048 BASIC METABOLIC PNL TOTAL CA: CPT | Performed by: PHYSICIAN ASSISTANT

## 2023-10-05 PROCEDURE — 86901 BLOOD TYPING SEROLOGIC RH(D): CPT | Performed by: THORACIC SURGERY (CARDIOTHORACIC VASCULAR SURGERY)

## 2023-10-05 PROCEDURE — 0DV44ZZ RESTRICTION OF ESOPHAGOGASTRIC JUNCTION, PERCUTANEOUS ENDOSCOPIC APPROACH: ICD-10-PCS | Performed by: THORACIC SURGERY (CARDIOTHORACIC VASCULAR SURGERY)

## 2023-10-05 PROCEDURE — 0DX64Z5 TRANSFER STOMACH TO ESOPHAGUS, PERCUTANEOUS ENDOSCOPIC APPROACH: ICD-10-PCS | Performed by: THORACIC SURGERY (CARDIOTHORACIC VASCULAR SURGERY)

## 2023-10-05 PROCEDURE — 71045 X-RAY EXAM CHEST 1 VIEW: CPT

## 2023-10-05 PROCEDURE — 94664 DEMO&/EVAL PT USE INHALER: CPT

## 2023-10-05 DEVICE — BIO-A TISSUE REINFORCEMENT 7CMX10CM
Type: IMPLANTABLE DEVICE | Site: ESOPHAGUS | Status: FUNCTIONAL
Brand: GORE BIO-A TISSUE REINFORCEMENT

## 2023-10-05 RX ORDER — MAGNESIUM HYDROXIDE 1200 MG/15ML
LIQUID ORAL AS NEEDED
Status: DISCONTINUED | OUTPATIENT
Start: 2023-10-05 | End: 2023-10-05 | Stop reason: HOSPADM

## 2023-10-05 RX ORDER — ACETAMINOPHEN 325 MG/1
975 TABLET ORAL ONCE
Status: COMPLETED | OUTPATIENT
Start: 2023-10-05 | End: 2023-10-05

## 2023-10-05 RX ORDER — DOCUSATE SODIUM 100 MG/1
100 CAPSULE, LIQUID FILLED ORAL 2 TIMES DAILY
Status: DISCONTINUED | OUTPATIENT
Start: 2023-10-05 | End: 2023-10-06 | Stop reason: HOSPADM

## 2023-10-05 RX ORDER — ONDANSETRON 2 MG/ML
4 INJECTION INTRAMUSCULAR; INTRAVENOUS ONCE AS NEEDED
Status: DISCONTINUED | OUTPATIENT
Start: 2023-10-05 | End: 2023-10-05 | Stop reason: HOSPADM

## 2023-10-05 RX ORDER — DEXAMETHASONE SODIUM PHOSPHATE 10 MG/ML
INJECTION, SOLUTION INTRAMUSCULAR; INTRAVENOUS AS NEEDED
Status: DISCONTINUED | OUTPATIENT
Start: 2023-10-05 | End: 2023-10-05

## 2023-10-05 RX ORDER — SODIUM CHLORIDE, SODIUM LACTATE, POTASSIUM CHLORIDE, CALCIUM CHLORIDE 600; 310; 30; 20 MG/100ML; MG/100ML; MG/100ML; MG/100ML
INJECTION, SOLUTION INTRAVENOUS CONTINUOUS PRN
Status: DISCONTINUED | OUTPATIENT
Start: 2023-10-05 | End: 2023-10-05

## 2023-10-05 RX ORDER — ATROPINE SULFATE 1 MG/ML
INJECTION, SOLUTION INTRAVENOUS AS NEEDED
Status: DISCONTINUED | OUTPATIENT
Start: 2023-10-05 | End: 2023-10-05

## 2023-10-05 RX ORDER — HYDROMORPHONE HCL/PF 1 MG/ML
0.5 SYRINGE (ML) INJECTION
Status: DISCONTINUED | OUTPATIENT
Start: 2023-10-05 | End: 2023-10-06 | Stop reason: HOSPADM

## 2023-10-05 RX ORDER — EPHEDRINE SULFATE 50 MG/ML
INJECTION INTRAVENOUS AS NEEDED
Status: DISCONTINUED | OUTPATIENT
Start: 2023-10-05 | End: 2023-10-05

## 2023-10-05 RX ORDER — OXYCODONE HCL 5 MG/5 ML
5 SOLUTION, ORAL ORAL EVERY 4 HOURS PRN
Status: DISCONTINUED | OUTPATIENT
Start: 2023-10-05 | End: 2023-10-06 | Stop reason: HOSPADM

## 2023-10-05 RX ORDER — LEVOTHYROXINE SODIUM 0.07 MG/1
75 TABLET ORAL
Status: DISCONTINUED | OUTPATIENT
Start: 2023-10-06 | End: 2023-10-06 | Stop reason: HOSPADM

## 2023-10-05 RX ORDER — SODIUM CHLORIDE, SODIUM LACTATE, POTASSIUM CHLORIDE, CALCIUM CHLORIDE 600; 310; 30; 20 MG/100ML; MG/100ML; MG/100ML; MG/100ML
100 INJECTION, SOLUTION INTRAVENOUS CONTINUOUS
Status: DISCONTINUED | OUTPATIENT
Start: 2023-10-05 | End: 2023-10-05

## 2023-10-05 RX ORDER — CEFAZOLIN SODIUM 2 G/50ML
2000 SOLUTION INTRAVENOUS ONCE
Status: COMPLETED | OUTPATIENT
Start: 2023-10-05 | End: 2023-10-05

## 2023-10-05 RX ORDER — FENTANYL CITRATE/PF 50 MCG/ML
50 SYRINGE (ML) INJECTION
Status: DISCONTINUED | OUTPATIENT
Start: 2023-10-05 | End: 2023-10-05 | Stop reason: HOSPADM

## 2023-10-05 RX ORDER — LIDOCAINE HYDROCHLORIDE AND EPINEPHRINE 10; 10 MG/ML; UG/ML
INJECTION, SOLUTION INFILTRATION; PERINEURAL AS NEEDED
Status: DISCONTINUED | OUTPATIENT
Start: 2023-10-05 | End: 2023-10-05 | Stop reason: HOSPADM

## 2023-10-05 RX ORDER — PROPOFOL 10 MG/ML
INJECTION, EMULSION INTRAVENOUS AS NEEDED
Status: DISCONTINUED | OUTPATIENT
Start: 2023-10-05 | End: 2023-10-05

## 2023-10-05 RX ORDER — SODIUM CHLORIDE, SODIUM LACTATE, POTASSIUM CHLORIDE, CALCIUM CHLORIDE 600; 310; 30; 20 MG/100ML; MG/100ML; MG/100ML; MG/100ML
60 INJECTION, SOLUTION INTRAVENOUS CONTINUOUS
Status: DISCONTINUED | OUTPATIENT
Start: 2023-10-05 | End: 2023-10-06

## 2023-10-05 RX ORDER — POLYETHYLENE GLYCOL 3350 17 G/17G
17 POWDER, FOR SOLUTION ORAL DAILY PRN
Status: DISCONTINUED | OUTPATIENT
Start: 2023-10-05 | End: 2023-10-06 | Stop reason: HOSPADM

## 2023-10-05 RX ORDER — FENTANYL CITRATE 50 UG/ML
INJECTION, SOLUTION INTRAMUSCULAR; INTRAVENOUS AS NEEDED
Status: DISCONTINUED | OUTPATIENT
Start: 2023-10-05 | End: 2023-10-05

## 2023-10-05 RX ORDER — METRONIDAZOLE 500 MG/100ML
500 INJECTION, SOLUTION INTRAVENOUS ONCE
Status: COMPLETED | OUTPATIENT
Start: 2023-10-05 | End: 2023-10-05

## 2023-10-05 RX ORDER — PANTOPRAZOLE SODIUM 40 MG/10ML
40 INJECTION, POWDER, LYOPHILIZED, FOR SOLUTION INTRAVENOUS
Status: DISCONTINUED | OUTPATIENT
Start: 2023-10-06 | End: 2023-10-06 | Stop reason: HOSPADM

## 2023-10-05 RX ORDER — ONDANSETRON 2 MG/ML
INJECTION INTRAMUSCULAR; INTRAVENOUS AS NEEDED
Status: DISCONTINUED | OUTPATIENT
Start: 2023-10-05 | End: 2023-10-05

## 2023-10-05 RX ORDER — ACETAMINOPHEN 160 MG/5ML
650 SUSPENSION ORAL EVERY 6 HOURS
Status: DISCONTINUED | OUTPATIENT
Start: 2023-10-05 | End: 2023-10-06 | Stop reason: HOSPADM

## 2023-10-05 RX ORDER — ROCURONIUM BROMIDE 10 MG/ML
INJECTION, SOLUTION INTRAVENOUS AS NEEDED
Status: DISCONTINUED | OUTPATIENT
Start: 2023-10-05 | End: 2023-10-05

## 2023-10-05 RX ORDER — ONDANSETRON 2 MG/ML
4 INJECTION INTRAMUSCULAR; INTRAVENOUS EVERY 6 HOURS PRN
Status: DISCONTINUED | OUTPATIENT
Start: 2023-10-05 | End: 2023-10-06 | Stop reason: HOSPADM

## 2023-10-05 RX ORDER — MAGNESIUM SULFATE HEPTAHYDRATE 40 MG/ML
2 INJECTION, SOLUTION INTRAVENOUS ONCE
Status: COMPLETED | OUTPATIENT
Start: 2023-10-05 | End: 2023-10-05

## 2023-10-05 RX ORDER — SENNOSIDES 8.6 MG
1 TABLET ORAL DAILY
Status: DISCONTINUED | OUTPATIENT
Start: 2023-10-05 | End: 2023-10-06 | Stop reason: HOSPADM

## 2023-10-05 RX ORDER — ENOXAPARIN SODIUM 100 MG/ML
40 INJECTION SUBCUTANEOUS DAILY
Status: DISCONTINUED | OUTPATIENT
Start: 2023-10-05 | End: 2023-10-06 | Stop reason: HOSPADM

## 2023-10-05 RX ADMIN — ACETAMINOPHEN 649.6 MG: 650 SUSPENSION ORAL at 23:05

## 2023-10-05 RX ADMIN — PROPOFOL 150 MG: 10 INJECTION, EMULSION INTRAVENOUS at 10:08

## 2023-10-05 RX ADMIN — SODIUM CHLORIDE 8 MCG: 900 INJECTION INTRAVENOUS at 10:06

## 2023-10-05 RX ADMIN — EPHEDRINE SULFATE 10 MG: 50 INJECTION INTRAVENOUS at 10:39

## 2023-10-05 RX ADMIN — ROCURONIUM BROMIDE 50 MG: 10 INJECTION, SOLUTION INTRAVENOUS at 10:09

## 2023-10-05 RX ADMIN — ENOXAPARIN SODIUM 40 MG: 40 INJECTION SUBCUTANEOUS at 18:04

## 2023-10-05 RX ADMIN — PHENYLEPHRINE HYDROCHLORIDE 10 MCG/MIN: 10 INJECTION INTRAVENOUS at 10:59

## 2023-10-05 RX ADMIN — ONDANSETRON 4 MG: 2 INJECTION INTRAMUSCULAR; INTRAVENOUS at 10:00

## 2023-10-05 RX ADMIN — MAGNESIUM SULFATE HEPTAHYDRATE 2 G: 40 INJECTION, SOLUTION INTRAVENOUS at 18:04

## 2023-10-05 RX ADMIN — SUGAMMADEX 150 MG: 100 INJECTION, SOLUTION INTRAVENOUS at 12:56

## 2023-10-05 RX ADMIN — ATROPINE SULFATE 0.5 MCG: 1 INJECTION, SOLUTION INTRAVENOUS at 10:40

## 2023-10-05 RX ADMIN — ACETAMINOPHEN 650 MG: 650 SUSPENSION ORAL at 18:03

## 2023-10-05 RX ADMIN — ACETAMINOPHEN 975 MG: 325 TABLET, FILM COATED ORAL at 08:39

## 2023-10-05 RX ADMIN — FENTANYL CITRATE 50 MCG: 50 INJECTION, SOLUTION INTRAMUSCULAR; INTRAVENOUS at 10:05

## 2023-10-05 RX ADMIN — SODIUM CHLORIDE, SODIUM LACTATE, POTASSIUM CHLORIDE, AND CALCIUM CHLORIDE: .6; .31; .03; .02 INJECTION, SOLUTION INTRAVENOUS at 10:30

## 2023-10-05 RX ADMIN — METRONIDAZOLE: 500 SOLUTION INTRAVENOUS at 10:35

## 2023-10-05 RX ADMIN — CEFAZOLIN SODIUM 1000 MG: 2 SOLUTION INTRAVENOUS at 10:11

## 2023-10-05 RX ADMIN — FENTANYL CITRATE 25 MCG: 50 INJECTION, SOLUTION INTRAMUSCULAR; INTRAVENOUS at 13:02

## 2023-10-05 RX ADMIN — ROCURONIUM BROMIDE 20 MG: 10 INJECTION, SOLUTION INTRAVENOUS at 10:52

## 2023-10-05 RX ADMIN — DEXAMETHASONE SODIUM PHOSPHATE 10 MG: 10 INJECTION, SOLUTION INTRAMUSCULAR; INTRAVENOUS at 10:11

## 2023-10-05 RX ADMIN — FENTANYL CITRATE 25 MCG: 50 INJECTION, SOLUTION INTRAMUSCULAR; INTRAVENOUS at 12:58

## 2023-10-05 RX ADMIN — FENTANYL CITRATE 50 MCG: 50 INJECTION, SOLUTION INTRAMUSCULAR; INTRAVENOUS at 11:32

## 2023-10-05 RX ADMIN — SODIUM CHLORIDE 8 MCG: 900 INJECTION INTRAVENOUS at 11:40

## 2023-10-05 RX ADMIN — SODIUM CHLORIDE, SODIUM LACTATE, POTASSIUM CHLORIDE, AND CALCIUM CHLORIDE 100 ML/HR: .6; .31; .03; .02 INJECTION, SOLUTION INTRAVENOUS at 08:56

## 2023-10-05 RX ADMIN — SENNOSIDES 8.6 MG: 8.6 TABLET, FILM COATED ORAL at 18:04

## 2023-10-05 RX ADMIN — ROCURONIUM BROMIDE 20 MG: 10 INJECTION, SOLUTION INTRAVENOUS at 12:24

## 2023-10-05 RX ADMIN — SODIUM CHLORIDE 4 MCG: 900 INJECTION INTRAVENOUS at 10:00

## 2023-10-05 RX ADMIN — DOCUSATE SODIUM 100 MG: 100 CAPSULE, LIQUID FILLED ORAL at 18:04

## 2023-10-05 NOTE — QUICK NOTE
Post Op Check Note - Surgery Resident  Aroldo Vincent 78 y.o. female MRN: [id number]  Unit/Bed#: [facility] 421-01 Encounter: [id number]    ASSESSMENT:  Aroldo Vincent is a 78 y.o. female who is status post laparoscopic paraesophageal hernia repair with mesh, EGD, gastroplasty and nissen fundoplication. Subjective: VSS. Satting 95% on room air. No chest pain or shortness of breath. Endorses some mild bilateral upper back pain. Physical Exam:  GEN: NAD  CV: RRR  Lung: Normal effort  Ab: Soft, NT/ND  Neuro: A+Ox3  Incisions: c/d/i    Blood pressure 148/72, pulse 74, temperature 98 °F (36.7 °C), resp. rate 16, height 5' 3" (1.6 m), weight 62.1 kg (137 lb), SpO2 97 %. ,Body mass index is 24.27 kg/m².       Intake/Output Summary (Last 24 hours) at 10/5/2023 1603  Last data filed at 10/5/2023 1314  Gross per 24 hour   Intake 1400 ml   Output 135 ml   Net 1265 ml       Invasive Devices     Peripheral Intravenous Line  Duration           Peripheral IV 10/05/23 Distal;Left;Ventral (anterior) Forearm <1 day    Peripheral IV 10/05/23 Right Arm <1 day                VTE Pharmacologic Prophylaxis: Enoxaparin (Lovenox)

## 2023-10-05 NOTE — INTERVAL H&P NOTE
H&P reviewed. After examining the patient I find no changes in the patients condition since the H&P had been written.     Vitals:    10/05/23 0802   BP: 133/74   Pulse: 73   Resp: 18   Temp: 98.5 °F (36.9 °C)   SpO2: 97%

## 2023-10-05 NOTE — OP NOTE
OPERATIVE REPORT  PATIENT NAME: Sofía Whittington    :  1945  MRN: [id number]  Pt Location: BE OR ROOM 08    SURGERY DATE: 10/5/2023    Surgeon(s) and Role:     * Sulema Durand MD - Primary     * Penny Nair PA-C - [facility] - Assisting    Preop Diagnosis:  Type III paraesophageal hernia K44.9    Post-Op Diagnosis Codes:  Type III paraesophageal hernia K44.9  Short esophagus    Procedure(s):  LAPAROSCOPIC PARAESOPHAGEAL HERNIA REPAIR. WITH MESH  ESOPHAGOGASTRODUODENOSCOPY (EGD)  GASTROPLASTY. ALY  NISSEN FUNDOPLICATION    Specimen(s):  ID Type Source Tests Collected by Time Destination   1 : Portion of Stomach and Hernia Sac Tissue Stomach TISSUE EXAM Sulema Durand MD 10/5/2023 1234        Estimated Blood Loss:   Minimal    Drains:  Urethral Catheter Latex 16 Fr. (Active)   Number of days: 0       Anesthesia Type:   General    Operative Indications:  Paraesophageal hernia type III  Ms Flores Jha has a large paraesophageal hernia that is symptomatic. Her underlying esophageal function is normal.  She is brought to the operating room for paraesophageal hernia repair. Operative Findings: Moderate paraesophageal hernia, type III. Short esophagus    Complications:   None    Procedure and Technique:  The patient was brought to the operating room placed in the supine position. After institution of adequate general anesthesia she was placed in the modified lithotomy position with her thighs bolstered to allow for reverse Trendelenburg. Her abdomen was insufflated with CO2 using a Veress needle at Mcintyre's point. An 11 mm port was placed using a Visiport technique 3 fingerbreadths above the umbilicus and just off to the left. A 5 mm port was placed in the left lateral abdomen for assistant's retraction. The liver retractor was placed through a subxiphoid incision.   An 11 mm port was placed in the midline and another 11 mm port was placed in the left upper quadrant for the surgeon's left and right hands. She was noted to have a moderate to large type III paraesophageal hernia. The gastrohepatic ligament over the caudate lobe where it was opened using the harmonic. The plane between the hernia sac and the right cami was then developed and dissection was carried in the avascular plane. Dissection was carried down to the base of the right cami and then anteriorly and up over the gastroesophageal complex. Dissection was then carried down to the base of the left cami. The fundus was then freed by dividing the short gastric vessels using the harmonic and this dissection was carried down to the base of the left cami. Downward traction on the hernia sac allows the mediastinal structures to be stripped off of it and have it reduced completely into the abdomen. The left side of the hernia sac was then resected to expose the angle of Hiss. From the right side, the esophagus is swept up and a plane behind the is easily developed. A Penrose drain was then placed around for retraction. Circumferential esophageal dissection was performed up to the level of the inferior pulmonary veins. Both the anterior and posterior vagus nerves were clearly visualized and protected. At the completion of this dissection it appeared as if the angle of Hiss was still at the level of the hiatus without undue downward traction on the esophagus. Intraoperative endoscopy is performed and the Z-line visualized endoscopically. The light of the endoscope can easily be seen on the laparoscopic images and appears to be at or just a little above the hiatus. The left upper quadrant 11 mm port was exchanged for a 15 mm port and a 42 [place]awk bougie was placed through the esophagus down to the stomach. A wedge fundoplasty was then performed of the Daniel type using multiple fires of a linear stapler. This created approximately 3 cm of neoesophagus within the abdomen.   The esophageal hiatus is then recalibrated using 2 pledgeted horizontal mattress sutures of 0 Ethibond posteriorly and 1 anterior to the esophagus. This creates a repair where the crura touched the esophagus circumferentially but the laparoscopic grasper is easily can go alongside the esophagus the chest.  A piece of [place] BioA mesh is then utilized to buttress the crural repair. The remaining fundus is then wrapped posterior to the esophagus and a shoeshine maneuver is performed. A floppy Nissen fundoplication is performed that is approximately 2 cm long where the uppermost stitch encompasses both sides of the stomach as well as the anterior esophagus just to the right of the vagus nerve. And the second more inferior stitch is just through stomach on either side. There is no undue torque on the wrap and it sits nicely centered. The liver retractor is removed. The hernia sac and wedge fundoplasty are placed inside of a protective bag and removed from the body. The 15 mm and 11 mm port sites have their fascial defects repaired with 0 Vicryl. The abdomen is desufflated and the incisions closed with 4-0 Monocryl and skin glue. The patient was then extubated on the table and brought to the recovery unit in stable condition having tolerated the procedure well. Sponge and instrument counts were correct. I was present for the entire procedure.     Patient Disposition:  PACU         SIGNATURE: Shazia Childress MD  DATE: October 5, 2023  TIME: 12:49 PM

## 2023-10-05 NOTE — ANESTHESIA PREPROCEDURE EVALUATION
Procedure:  laparoscopic paraesophageal hernia repair, possible mesh, fundoplication (Chest)  ESOPHAGOGASTRODUODENOSCOPY (EGD) (Esophagus)  possible GASTROPLASTY,ALY (Chest)    Ms Harsha Shelley is an 78year old female who is being evaluated for a hiatal hernia. She underwent EGD on 2/14/23 that showed a medium sliding hiatal hernia without Grimsley Stagger lesions present. Upper GI on 5/5/2023 that showed a type 3 hiatal hernia with both sliding and paraesophageal component with mild to moderate gastroesophageal reflux. She was last seen in our office on 7/11/2023 at which point she was interested in surgical repair and we recommended esophageal manometry and cardiac risk assessment.      Esophageal manometry on 7/25/2023 revealed 9/10 swallows with normal esophageal contractile pattern, mean DCI 1205, 1 out of 10 swallows with weak esophageal contractility pattern. LES-median IRP within normal lumits. Imepdance- 70% complete clearance of liquid bolus. GEJ relatively to LES measures 4cm. Rapid swallow index is less than 1. [place] classification- normal      She had an echocardiogram on September 7, 2023. This demonstrates an ejection fraction of 60% with normal wall motion. She had mild to moderate aortic regurgitation and mild mitral regurgitation. RV systolic pressure was normal.         Relevant Problems   CARDIO   (+) Hypercholesterolemia   (+) Hypertension, essential      ENDO   (+) Hypothyroid      GI/HEPATIC   (+) Dysphagia   (+) Gastroesophageal reflux disease with esophagitis without hemorrhage   (+) Paraesophageal hernia      HEMATOLOGY   (+) Iron deficiency anemia due to chronic blood loss      MUSCULOSKELETAL   (+) Paraesophageal hernia        Physical Exam    Airway    Mallampati score: II         Dental       Cardiovascular  Rhythm: regular, Rate: normal, Murmur,     Pulmonary  Rhonchi,     Other Findings        Anesthesia Plan  ASA Score- 3     Anesthesia Type- general with ASA Monitors.          Additional Monitors:   Airway Plan: ETT. Plan Factors-    Chart reviewed. EKG reviewed. Imaging results reviewed. Existing labs reviewed. Patient summary reviewed. Patient is not a current smoker. Induction- intravenous. Postoperative Plan- Plan for postoperative opioid use. Planned trial extubation    Informed Consent- Anesthetic plan and risks discussed with patient. I personally reviewed this patient with the CRNA. Discussed and agreed on the Anesthesia Plan with the CRNA. Oksana Henry

## 2023-10-05 NOTE — ANESTHESIA POSTPROCEDURE EVALUATION
Post-Op Assessment Note    CV Status:  Stable    Pain management: adequate     Mental Status:  Alert and awake   Hydration Status:  Euvolemic   PONV Controlled:  Controlled   Airway Patency:  Patent      Post Op Vitals Reviewed: Yes      Staff: CRNA         No notable events documented.     BP   154/71   Temp   97.4   Pulse  81   Resp   18   SpO2   100

## 2023-10-06 ENCOUNTER — APPOINTMENT (INPATIENT)
Dept: RADIOLOGY | Facility: HOSPITAL | Age: 78
DRG: 327 | End: 2023-10-06
Payer: MEDICARE

## 2023-10-06 VITALS
RESPIRATION RATE: 13 BRPM | DIASTOLIC BLOOD PRESSURE: 67 MMHG | WEIGHT: 141.54 LBS | HEIGHT: 63 IN | OXYGEN SATURATION: 95 % | SYSTOLIC BLOOD PRESSURE: 128 MMHG | HEART RATE: 73 BPM | TEMPERATURE: 98.8 F | BODY MASS INDEX: 25.08 KG/M2

## 2023-10-06 LAB
ANION GAP SERPL CALCULATED.3IONS-SCNC: 7 MMOL/L
BASOPHILS # BLD AUTO: 0.01 THOUSANDS/ÂΜL (ref 0–0.1)
BASOPHILS NFR BLD AUTO: 0 % (ref 0–1)
BUN SERPL-MCNC: 9 MG/DL (ref 5–25)
CALCIUM SERPL-MCNC: 8.2 MG/DL (ref 8.4–10.2)
CHLORIDE SERPL-SCNC: 106 MMOL/L (ref 96–108)
CO2 SERPL-SCNC: 27 MMOL/L (ref 21–32)
CREAT SERPL-MCNC: 0.66 MG/DL (ref 0.6–1.3)
EOSINOPHIL # BLD AUTO: 0.01 THOUSAND/ÂΜL (ref 0–0.61)
EOSINOPHIL NFR BLD AUTO: 0 % (ref 0–6)
ERYTHROCYTE [DISTWIDTH] IN BLOOD BY AUTOMATED COUNT: 13.5 % (ref 11.6–15.1)
GFR SERPL CREATININE-BSD FRML MDRD: 83 ML/MIN/1.73SQ M
GLUCOSE SERPL-MCNC: 101 MG/DL (ref 65–140)
HCT VFR BLD AUTO: 38 % (ref 34.8–46.1)
HGB BLD-MCNC: 12.7 G/DL (ref 11.5–15.4)
IMM GRANULOCYTES # BLD AUTO: 0.03 THOUSAND/UL (ref 0–0.2)
IMM GRANULOCYTES NFR BLD AUTO: 0 % (ref 0–2)
LYMPHOCYTES # BLD AUTO: 0.99 THOUSANDS/ÂΜL (ref 0.6–4.47)
LYMPHOCYTES NFR BLD AUTO: 10 % (ref 14–44)
MAGNESIUM SERPL-MCNC: 2.3 MG/DL (ref 1.9–2.7)
MCH RBC QN AUTO: 32.9 PG (ref 26.8–34.3)
MCHC RBC AUTO-ENTMCNC: 33.4 G/DL (ref 31.4–37.4)
MCV RBC AUTO: 98 FL (ref 82–98)
MONOCYTES # BLD AUTO: 1.08 THOUSAND/ÂΜL (ref 0.17–1.22)
MONOCYTES NFR BLD AUTO: 11 % (ref 4–12)
NEUTROPHILS # BLD AUTO: 7.8 THOUSANDS/ÂΜL (ref 1.85–7.62)
NEUTS SEG NFR BLD AUTO: 79 % (ref 43–75)
NRBC BLD AUTO-RTO: 0 /100 WBCS
PLATELET # BLD AUTO: 216 THOUSANDS/UL (ref 149–390)
PMV BLD AUTO: 10 FL (ref 8.9–12.7)
POTASSIUM SERPL-SCNC: 4.1 MMOL/L (ref 3.5–5.3)
RBC # BLD AUTO: 3.86 MILLION/UL (ref 3.81–5.12)
SODIUM SERPL-SCNC: 140 MMOL/L (ref 135–147)
WBC # BLD AUTO: 9.92 THOUSAND/UL (ref 4.31–10.16)

## 2023-10-06 PROCEDURE — C9113 INJ PANTOPRAZOLE SODIUM, VIA: HCPCS | Performed by: PHYSICIAN ASSISTANT

## 2023-10-06 PROCEDURE — 94664 DEMO&/EVAL PT USE INHALER: CPT

## 2023-10-06 PROCEDURE — 97166 OT EVAL MOD COMPLEX 45 MIN: CPT

## 2023-10-06 PROCEDURE — NC001 PR NO CHARGE: Performed by: THORACIC SURGERY (CARDIOTHORACIC VASCULAR SURGERY)

## 2023-10-06 PROCEDURE — 99024 POSTOP FOLLOW-UP VISIT: CPT | Performed by: THORACIC SURGERY (CARDIOTHORACIC VASCULAR SURGERY)

## 2023-10-06 PROCEDURE — 74220 X-RAY XM ESOPHAGUS 1CNTRST: CPT

## 2023-10-06 PROCEDURE — 83735 ASSAY OF MAGNESIUM: CPT | Performed by: STUDENT IN AN ORGANIZED HEALTH CARE EDUCATION/TRAINING PROGRAM

## 2023-10-06 PROCEDURE — 80048 BASIC METABOLIC PNL TOTAL CA: CPT | Performed by: STUDENT IN AN ORGANIZED HEALTH CARE EDUCATION/TRAINING PROGRAM

## 2023-10-06 PROCEDURE — 97162 PT EVAL MOD COMPLEX 30 MIN: CPT

## 2023-10-06 PROCEDURE — 85025 COMPLETE CBC W/AUTO DIFF WBC: CPT | Performed by: STUDENT IN AN ORGANIZED HEALTH CARE EDUCATION/TRAINING PROGRAM

## 2023-10-06 RX ORDER — PANTOPRAZOLE SODIUM 40 MG/1
40 TABLET, DELAYED RELEASE ORAL DAILY
Qty: 60 TABLET | Refills: 0 | Status: SHIPPED | OUTPATIENT
Start: 2023-10-06

## 2023-10-06 RX ADMIN — ACETAMINOPHEN 649.6 MG: 650 SUSPENSION ORAL at 05:15

## 2023-10-06 RX ADMIN — LEVOTHYROXINE SODIUM 75 MCG: 75 TABLET ORAL at 05:15

## 2023-10-06 RX ADMIN — ENOXAPARIN SODIUM 40 MG: 40 INJECTION SUBCUTANEOUS at 09:14

## 2023-10-06 RX ADMIN — SODIUM CHLORIDE, SODIUM LACTATE, POTASSIUM CHLORIDE, AND CALCIUM CHLORIDE 60 ML/HR: .6; .31; .03; .02 INJECTION, SOLUTION INTRAVENOUS at 00:49

## 2023-10-06 RX ADMIN — OXYCODONE HYDROCHLORIDE 5 MG: 5 SOLUTION ORAL at 07:47

## 2023-10-06 RX ADMIN — ACETAMINOPHEN 650 MG: 650 SUSPENSION ORAL at 10:32

## 2023-10-06 RX ADMIN — PANTOPRAZOLE SODIUM 40 MG: 40 INJECTION, POWDER, FOR SOLUTION INTRAVENOUS at 06:01

## 2023-10-06 RX ADMIN — OXYCODONE HYDROCHLORIDE 5 MG: 5 SOLUTION ORAL at 15:22

## 2023-10-06 RX ADMIN — DOCUSATE SODIUM 100 MG: 100 CAPSULE, LIQUID FILLED ORAL at 17:24

## 2023-10-06 RX ADMIN — IOHEXOL 150 ML: 350 INJECTION, SOLUTION INTRAVENOUS at 11:24

## 2023-10-06 RX ADMIN — DOCUSATE SODIUM 100 MG: 100 CAPSULE, LIQUID FILLED ORAL at 09:14

## 2023-10-06 RX ADMIN — SENNOSIDES 8.6 MG: 8.6 TABLET, FILM COATED ORAL at 09:14

## 2023-10-06 RX ADMIN — OXYCODONE HYDROCHLORIDE 5 MG: 5 SOLUTION ORAL at 00:49

## 2023-10-06 RX ADMIN — ACETAMINOPHEN 650 MG: 650 SUSPENSION ORAL at 17:24

## 2023-10-06 NOTE — CASE MANAGEMENT
Case Management Assessment & Discharge Planning Note    Patient name Darius Tamayo  [facility] [id number]  : 1945 Date 10/6/2023       Current Admission Date: 10/5/2023  Current Admission Diagnosis:Paraesophageal hernia   Patient Active Problem List    Diagnosis Date Noted   • Elevated hemoglobin A1c measurement 2023   • Osteopenia    • Paraesophageal hernia 2023   • Gastroesophageal reflux disease with esophagitis without hemorrhage 2022   • Iron deficiency anemia due to chronic blood loss 2022   • Dysphagia 2021   • Vasovagal near-syncope 2021   • Rosacea 04/15/2017   • Atrophic vaginitis 2016   • Hypertension, essential 2015   • Vitamin B12 deficiency 2015   • Vitamin D deficiency 2015   • Hypercholesterolemia 2013   • Hypothyroid 2013      LOS (days): 1  Geometric Mean LOS (GMLOS) (days): 4.30  Days to GMLOS:3.4     OBJECTIVE:    Risk of Unplanned Readmission Score: 9.56         Current admission status: Inpatient       Preferred Pharmacy:   [address] [address]  Phone: [phone] Fax: [address] [place], [place] - [address] [place] [address]  Phone: [phone] Fax: [phone]    [address] [facility], [address]  Phone: [phone] Fax: [phone]    Primary Care Provider: Pietro Hansen MD    Primary Insurance: MEDICARE  Secondary Insurance: BLUE CROSS    ASSESSMENT:  Active Health Care Proxies    There are no active Health Care Proxies on file.                  Readmission Root Cause  30 Day Readmission: No    Patient Information  Admitted from[de-identified] Home  Mental Status: Alert  During Assessment patient was accompanied by: Daughter  Assessment information provided by[de-identified] Daughter  Support Systems: Self, Daughter, Family members  [place] of Residence: [facility] [address] do you live in?: [address] entry access options. Select all that apply.: Stairs  Number of steps to enter home. : 1  Do the steps have railings?: No  Type of Current Residence: [facility]  In the last 12 months, was there a time when you were not able to pay the mortgage or rent on time?: No  In the last 12 months, how many places have you lived?: 1  In the last 12 months, was there a time when you did not have a steady place to sleep or slept in a shelter (including now)?: No  Homeless/housing insecurity resource given?: N/A  Living Arrangements: Lives Alone    Activities of Daily Living Prior to Admission  Functional Status: Independent  Completes ADLs independently?: Yes  Ambulates independently?: Yes  Does patient use assisted devices?: No  Does patient currently own DME?: Yes  What DME does the patient currently own?: Brittany Myers  Does patient have a history of Outpatient Therapy (PT/OT)?: No  Does the patient have a history of Short-Term Rehab?: No  Does patient have a history of HHC?: No  Does patient currently have [address]?: No         Patient Information Continued  Income Source: Pension/[facility]  Does patient have prescription coverage?: Yes  Within the past 12 months, you worried that your food would run out before you got the money to buy more.: Never true  Within the past 12 months, the food you bought just didn't last and you didn't have money to get more.: Never true  Food insecurity resource given?: N/A  Does patient receive dialysis treatments?: No  Does patient have a history of substance abuse?: No  Does patient have a history of Mental Health Diagnosis?: No      Means of Transportation  Means of Transport to Appts[de-identified] Family transport  In the past 12 months, has lack of transportation kept you from medical appointments or from getting medications?: No  In the past 12 months, has lack of transportation kept you from meetings, work, or from getting things needed for daily living?: No  Was application for public transport provided?: N/A      DISCHARGE DETAILS:    Discharge planning discussed with[de-identified] Patient and daughter at bedside  Freedom of Choice: Yes     CM contacted family/caregiver?: Yes  Were Treatment Team discharge recommendations reviewed with patient/caregiver?: Yes  Did patient/caregiver verbalize understanding of patient care needs?: Yes  Were patient/caregiver advised of the risks associated with not following Treatment Team discharge recommendations?: Yes    Contacts  Patient Contacts: Elizabeth Crockett  Relationship to Patient[de-identified] Family  Contact Method: In Person  Reason/Outcome: Discharge Planning, Continuity of [address]         Is the patient interested in [facility] at discharge?: No    Would you like to participate in our [address] [employer] service program?  : Yes    Treatment Team Recommendation: Home  Discharge Destination Plan[de-identified] Home  Transport at Discharge : Family         Additional Comments: CM Resident met with patient and patients daughter at bedside for CM assessment. Patient had laparoscopic paraesophageal hernia repair surgery 10/5. Patient stated that she currently lives alone in a ranch style home, however her daughter will be staying with her for a few nights after d/c. Patient is independent with ADL's and does not use any DME. Patient has no hx of HHC, PT/OT, rehab. CM Resident informed patient and daughter about [address], and they are interested in obtaining rx from [facility] at d/c.  CM Resident to be available for any d/c needs.     CM reviewed d/c planning process including the following: identifying help at home, patient preference for d/c planning needs, Discharge Lounge, [facility] Meds to Bed program, availability of treatment team to discuss questions or concerns patient and/or family may have regarding understanding medications and recognizing signs and symptoms once discharged. CM also encouraged patient to follow up with all recommended appointments after discharge. Patient advised of importance for patient and family to participate in managing patient’s medical well being.

## 2023-10-06 NOTE — PHYSICAL THERAPY NOTE
Physical Therapy Evaluation     Patient's Name: Jonny Guerra    Admitting Diagnosis  Paraesophageal hernia [K44.9]    Problem List  Patient Active Problem List   Diagnosis    Atrophic vaginitis    Hypercholesterolemia    Hypertension, essential    Rosacea    Vitamin B12 deficiency    Vitamin D deficiency    Hypothyroid    Vasovagal near-syncope    Dysphagia    Iron deficiency anemia due to chronic blood loss    Gastroesophageal reflux disease with esophagitis without hemorrhage    Paraesophageal hernia    Elevated hemoglobin A1c measurement    Osteopenia       Past Medical History  Past Medical History:   Diagnosis Date    LYLE (acute kidney injury) ([address]) 3/22/2021    Dehydration after exertion 3/25/2021    Disease of thyroid gland     Hypertension     Osteopenia     last assessed 12/17/13    PONV (postoperative nausea and vomiting)     Syncope and collapse 3/22/2021    Vaginal atrophy        Past Surgical History  Past Surgical History:   Procedure Laterality Date    COLONOSCOPY      DILATION AND CURETTAGE OF UTERUS      TUBAL LIGATION          10/06/23 0905   PT Last Visit   PT Visit Date 10/06/23   Note Type   Note type Evaluation   Pain Assessment   Pain Assessment Tool FLACC   Pain Location/Orientation Location: Abdomen;Orientation: Left;Orientation: Upper   Pain Onset/Description Onset: Ongoing;Frequency: Intermittent; Descriptor: Aching;Descriptor: Discomfort   Effect of Pain on Daily Activities no increased pain   Patient's Stated Pain Goal No pain   Hospital Pain Intervention(s) Repositioned; Ambulation/increased activity; Emotional support   Pain Rating: FLACC (Rest) - Face 0   Pain Rating: FLACC (Rest) - Legs 0   Pain Rating: FLACC (Rest) - Activity 0   Pain Rating: FLACC (Rest) - Cry 1   Pain Rating: FLACC (Rest) - Consolability 0   Score: FLACC (Rest) 1   Pain Rating: FLACC (Activity) - Face 0   Pain Rating: FLACC (Activity) - Legs 0   Pain Rating: FLACC (Activity) - Activity 0   Pain Rating: FLACC (Activity) - Cry 0   Pain Rating: FLACC (Activity) - Consolability 0   Score: FLACC (Activity) 0   Restrictions/Precautions   Braces or Orthoses   (denies; applied pt's stockings and shoes prior to amb; RN informed)   Other Precautions Telemetry   Home Living   Type of [address] [facility] [address] One level  (1 BONNIE)   Prior Function   Level of [place] Independent with functional mobility  (amb w/o AD)   Lives With Alone  (family will stay to (A))   Receives Help From [facility] in the last 6 months 0   General   Additional Pertinent History cleared for assessment by nsg   Family/Caregiver Present Yes   Cognition   Overall Cognitive Status WFL   Arousal/Participation Alert   Orientation Level Oriented to person;Oriented to place;Oriented to situation   Memory Within functional limits   Following Commands Follows one step commands without difficulty   Subjective   Subjective Alert; in the chair; agreeable to mobilize   RUE Assessment   RUE Assessment WFL  (AROM)   LUE Assessment   LUE Assessment WFL  (AROM)   RLE Assessment   RLE Assessment WFL  (AROM)   Strength RLE   RLE Overall Strength   (good -)   LLE Assessment   LLE Assessment WFL  (AROM)   Strength LLE   LLE Overall Strength   (good -)   Transfers   Sit to Stand 6  Modified independent   Stand to Sit 6  Modified independent   Ambulation/Elevation   Gait pattern Short stride; Inconsistent chano  (no overt LOB)   Gait Assistance 6  Modified independent   Assistive Device None   Distance 170 ft + 300 ft + 270 ft w/ standing rest stops and steps negotiation in between   Stair Management Assistance 6  Modified independent   Stair Management Technique One rail R;Step to pattern; Foreward;Backward;Nonreciprocal  (reviewed sequencing)   Number of Stairs 2  (1 step x 2 trials)   Balance   Static Sitting Good   Static Standing Fair +   Ambulatory Fair   Activity Tolerance   Activity Tolerance Patient tolerated treatment well   Medical Staff Made Aware Co-eval performed w/ OTR due to complexity of medical status and multiple comorbidities   Nurse Made Aware spoke to Jess Wilson RN   Assessment   Assessment Pt is 78 y.o. female admitted with Dx of Type III paraesophageal hernia and Short esophagus and underwent LAPAROSCOPIC PARAESOPHAGEAL HERNIA REPAIR. WITH MESH, ESOPHAGOGASTRODUODENOSCOPY (EGD), GASTROPLASTY ALY and NISSEN FUNDOPLICATION on 35/0/1479. Pt 's comorbidities affecting POC include: dysphagia, Hypertension, Osteopenia, Iron deficiency anemia due to chronic blood loss, PONV (postoperative nausea and vomiting), and Syncope and collapse and personal factors of: advanced age, living alone and BONNIE. Pt's clinical presentation is currently  evolving which is evident in ongoing telem monitoring and ongoing postop management and additional testing pending (esophagram). Pt presents w/ min postop guarding, min generalized weakness, min deconditioning, and inconsistent gait patterns (no overt uncorrected LOB, gross knee buckling, or swaying observed) but w/ overall observed mobility status on level surfaces and elevations appearing to be at or near premorbid level; based on above, anticipate pt will return home w/ available family support upon D/C from PT/mobility stand point and when medically cleared; no other immediate PT needs otherwise identified while in the hospital; recommend restorative amb while in the hospital w/ staff; will otherwise sign off.    Goals   Patient Goals to walk   PT Treatment Day 0   Plan   Treatment/Interventions Spoke to nursing;OT;Family   PT Frequency Other (Comment)  (D/C PT)   Recommendation   PT Discharge Recommendation No rehabilitation needs   AM-PAC Basic Mobility Inpatient   Turning in Flat Bed Without Bedrails 4   Lying on Back to Sitting on Edge of Flat Bed Without Bedrails 4   Moving Bed to Chair 4   Standing Up From Chair Using Arms 4   Walk in Room 4   Climb 3-5 Stairs With Railing 4   Basic Mobility Inpatient Raw Score 24   Basic Mobility Standardized Score 57.68   Highest Level Of Mobility   -[facility] Goal 8: Walk 250 feet or more   -HLM Achieved 8: Walk 250 feet ot more   Modified [place] Scale   Modified [place] Scale 2   End of Consult   Patient Position at End of Consult Bedside chair; All needs within reach       [facility], PT

## 2023-10-06 NOTE — PROGRESS NOTES
Progress Note - Raymond Tai 78 y.o. female MRN: [id number]    Unit/Bed#: [facility] 421-01 Encounter: [id number]      Assessment:  Raymond Tai is a 78 y.o. female with type III PEH, s/p PEHR w/mesh, nissen fundoplication, elissa gastroplasty on 10/5    VSS ORA, Tmax 100.7F 22:51 recorded temp in Epic, otherwise afebrile  Pulling 1250 on IS  cre 0.66  WBC 9.9, Hgb 12.7  UOP 1.67L      Plan:  Monitor fever/WBC curve  Continue CLD no carbonation  F/u UGI this am  Pending above, may consider full liquids   DVT ppx  Pulmonary toilet  Q1hr IS use    Subjective:   Patient seen and examined bedside. Endorses heaven-incisional pain. No nausea or emesis. Pulling 1250 on IS. Denies acid reflux symptoms. No flatus or BM. Objective:     Vitals: Blood pressure 133/63, pulse 71, temperature (!) 100.7 °F (38.2 °C), temperature source Oral, resp. rate 20, height 5' 3" (1.6 m), weight 64.2 kg (141 lb 8.6 oz), SpO2 95 %. ,Body mass index is 25.07 kg/m². Intake/Output Summary (Last 24 hours) at 10/6/2023 0705  Last data filed at 10/6/2023 0306  Gross per 24 hour   Intake 1910 ml   Output 1685 ml   Net 225 ml       Physical Exam:   General - no acute distress, responsive and cooperative  CV - warm, regular rate  Pulm - normal work of breathing, no respiratory distress  Abd - soft, nondistended, appropriately tender, incisions c/d/i  Neuro - m/s grossly intact, cn grossly intact  Ext - moving all extremities       Invasive Devices     Peripheral Intravenous Line  Duration           Peripheral IV 10/05/23 Distal;Left;Ventral (anterior) Forearm <1 day    Peripheral IV 10/05/23 Right Arm <1 day    Peripheral IV 10/05/23 Right;Ventral (anterior) Forearm <1 day                Lab, Imaging and other studies: I have personally reviewed pertinent reports.     VTE Pharmacologic Prophylaxis: Enoxaparin (Lovenox)  VTE Mechanical Prophylaxis: sequential compression device

## 2023-10-06 NOTE — DISCHARGE SUMMARY
Discharge Summary - Thoracic Surgery   Jermain Amezcua 78 y.o. female MRN: [id number]  Unit/Bed#: [facility] 421-01 Encounter: [id number]    Admission Date: 10/5/23  Admission Orders (From admission, onward)     Ordered        10/05/23 1301  Inpatient Admission  Once                         Discharge Date: 10/6/23    Admitting Diagnosis: Paraesophageal hernia [K44.9]    Discharge Diagnosis: paraesophageal hernia repair    Medical Problems     Resolved Problems  Date Reviewed: 9/1/2023   None         Attending: Dr. Vivian Palacios Physician(s): n/a    Procedures Performed:   10/5: LAPAROSCOPIC PARAESOPHAGEAL HERNIA REPAIR. WITH MESH, ESOPHAGOGASTRODUODENOSCOPY (EGD), GASTROPLASTY. ALY, NISSEN FUNDOPLICATION    Pathology: pending    Hospital Course:   Patient admitted s/p paraesophageaL hernia repair and observed overnight. Did well over the night, had a barium swallow on 10/6 AM which showed no esophageal perforation. Her diet was advanced from clear liquids slowly, which she tolerated. Condition at Discharge: good     Discharge instructions/Information to patient and family:   See after visit summary for information provided to patient and family. Provisions for Follow-Up Care:  See after visit summary for information related to follow-up care and any pertinent home health orders. Disposition: Home      Planned Readmission: No    Discharge Statement   I spent 20 minutes discharging the patient. This time was spent on the day of discharge. I had direct contact with the patient on the day of discharge. Additional documentation is required if more than 30 minutes were spent on discharge. Discharge Medications:  See after visit summary for reconciled discharge medications provided to patient and family.

## 2023-10-06 NOTE — DISCHARGE INSTR - AVS FIRST PAGE
Gently wash your incisions daily with soap and water, do not soak in a tub. Do not apply any lotions, creams, or ointments to incisions. No lifting over 10 lbs or strenuous exercise. Please call the office first if you have any questions or concerns during your post op period, prior to going to the emergency room or urgent care.

## 2023-10-06 NOTE — OCCUPATIONAL THERAPY NOTE
Occupational Therapy Evaluation     Patient Name: Govind WEAVER Date: 10/6/2023  Problem List  Active Problems: There are no active Hospital Problems. Past Medical History  Past Medical History:   Diagnosis Date    LYLE (acute kidney injury) ([address]) 3/22/2021    Dehydration after exertion 3/25/2021    Disease of thyroid gland     Hypertension     Osteopenia     last assessed 12/17/13    PONV (postoperative nausea and vomiting)     Syncope and collapse 3/22/2021    Vaginal atrophy      Past Surgical History  Past Surgical History:   Procedure Laterality Date    COLONOSCOPY      DILATION AND CURETTAGE OF UTERUS      GASTROPLASTY N/A 10/5/2023    Procedure: [place];  Surgeon: Latasha Gunderson MD;  Location: [facility];  Service: Thoracic    MI ESOPHAGOGASTRODUODENOSCOPY TRANSORAL DIAGNOSTIC N/A 10/5/2023    Procedure: ESOPHAGOGASTRODUODENOSCOPY (EGD); Surgeon: Latasha Gunderson MD;  Location: [facility];  Service: Thoracic    MI LAPS RPR PARAESPHGL HRNA INCL FUNDPLSTY W/O MESH N/A 10/5/2023    Procedure: laparoscopic paraesophageal hernia repair, with mesh, fundoplication;  Surgeon: Latasha Gunderson MD;  Location: [facility];  Service: Thoracic    TUBAL LIGATION             10/06/23 0904   OT Last Visit   OT Visit Date 10/06/23   Note Type   Note type Evaluation   Pain Assessment   Pain Assessment Tool 0-10   Pain Score 3   Pain Location/Orientation Location: Abdomen;Orientation: Left   Patient's Stated Pain Goal No pain   Hospital Pain Intervention(s) Repositioned; Ambulation/increased activity   Restrictions/Precautions   Weight Bearing Precautions Per Order No   Other Precautions Telemetry   Home Living   Type of [address] [place] [address]  One level  (1STE)   Bathroom Shower/Tub Tub/shower unit   Bathroom Toilet Standard   Bathroom Accessibility Accessible   Additional Comments Pt lives in a 1 SH with 1 BONNIE, uses standard height toilet   Prior Function   Level of [place] Independent with ADLs; Independent with functional mobility; Independent with IADLS   Lives With Alone  (family will assist as needed)   Receives Help From Family   IADLs Independent with meal prep; Independent with medication management; Family/Friend/Other provides transportation   Falls in the last 6 months 0   Vocational Retired   Lifestyle   Autonomy Pta pt I with ADL, IADL and fxnal mobility with no AD, does not drive   Reciprocal Relationships supportive family   Service to Others retired   [address] [facility] enjoys time with family   Subjective   Subjective "I move pretty good"   ADL   Where Assessed Chair   Eating Assistance 6  Modified independent   Grooming Assistance 6  Modified Independent   UB Bathing Assistance 6  Modified Independent   LB Bathing Assistance 6  Modified Independent   UB Dressing Assistance 6  Modified independent   LB Dressing Assistance 6  Modified independent   Toileting Assistance  6  Modified independent   Functional Assistance 6  Modified independent   Bed Mobility   Additional Comments Pt greeted and left in chair with all needs within reach and dtr present   Transfers   Sit to Stand 6  Modified independent   Stand to Sit 6  Modified independent   Additional Comments no AD   Functional Mobility   Functional Mobility 6  Modified independent   Additional Comments Pt performs household distance mobility with MOD I with no AD   Additional items   (none)   Balance   Static Sitting Good   Dynamic Sitting Good   Static Standing Fair +   Dynamic Standing Fair +   Ambulatory Fair   Activity Tolerance   Activity Tolerance Patient tolerated treatment well   Medical Staff Made Aware Co-eval with DPT due to medical complexity   Nurse Made Aware RN cleared for therapy   RUE Assessment   RUE Assessment WFL   LUE Assessment   LUE Assessment WFL   Hand Function   Gross Motor Coordination Functional   Fine Motor Coordination Functional   Sensation   Light Touch No apparent deficits Psychosocial   Psychosocial (WDL) WDL   Cognition   Overall Cognitive Status WFL   Arousal/Participation Alert; Cooperative   Attention Within functional limits   Orientation Level Oriented X4   Memory Within functional limits   Following Commands Follows all commands and directions without difficulty   Comments Pt cooperative to therapy, demonstrates understanding to all cues for safety   Assessment   Prognosis Good   Assessment Pt is a 78 y.o. female who was admitted to [address] [place] [address] on 10/5/2023 s/p PEHR with mesh, nissen fundoplication, elissa gastroplasty. Pt seen for an OT evaluation per active OT orders. Pt  has a past medical history of LYLE (acute kidney injury) ([address]), Dehydration after exertion, Disease of thyroid gland, Hypertension, Osteopenia, PONV (postoperative nausea and vomiting), Syncope and collapse, and Vaginal atrophy. Pt lives alone in a 1  with 1 [facility], uses standard toilet. Pta, pt was independent w/ ADL/IADL and functional mobility, was not driving and was not using any DME at baseline. Currently, pt is Mod I for UB ADL, Mod I for LB ADL, and completed transfers/FM w Mod I. The patient's raw score on the AM-PAC Daily Activity Inpatient Short Form is 24. A raw score of greater than or equal to 19 suggests the patient may benefit from discharge to home. Please refer to the recommendation of the Occupational Therapist for safe discharge planning. Pt is likely at fxnal baseline and has adequate assist at home, indicating no further acute OT needs at this time. From OT standpoint, recommend home with no further rehab needs upon D/C. D/C acute OT. Pt was left seated in bedside chair with all needs within reach and dtr present.    Goals   Patient Goals to walk more   Plan   OT Frequency Eval only   Recommendation   OT Discharge Recommendation No rehabilitation needs   AM-PAC Daily Activity Inpatient   Lower Body Dressing 4   Bathing 4   Toileting 4   Upper Body Dressing 4   Grooming 4 Eating 4   Daily Activity Raw Score 24   Daily Activity Standardized Score (Calc for Raw Score >=11) 57.54   AM-PAC Applied Cognition Inpatient   Following a Speech/Presentation 4   Understanding Ordinary Conversation 4   Taking Medications 4   Remembering Where Things Are Placed or Put Away 4   Remembering List of 4-5 Errands 4   Taking Care of Complicated Tasks 4   Applied Cognition Raw Score 24   Applied Cognition Standardized Score 62.21   End of Consult   Education Provided Yes   Patient Position at End of Consult Bedside chair; All needs within reach   Nurse Communication Nurse aware of consult     DORINDA Ureña, OTR/L

## 2023-10-06 NOTE — RESTORATIVE TECHNICIAN NOTE
Restorative Technician Note      Patient Name: Elliot Bernabe     Restorative Tech Visit Date: 10/06/23  Note Type: Mobility  Patient Position Upon Consult: Bedside chair  Activity Performed: Ambulated  Assistive Device: Other (Comment) (none)  Patient Position at End of Consult: All needs within reach;  Bedside chair

## 2023-10-07 PROCEDURE — 88307 TISSUE EXAM BY PATHOLOGIST: CPT | Performed by: STUDENT IN AN ORGANIZED HEALTH CARE EDUCATION/TRAINING PROGRAM

## 2023-10-09 ENCOUNTER — TELEPHONE (OUTPATIENT)
Dept: CARDIAC SURGERY | Facility: CLINIC | Age: 78
End: 2023-10-09

## 2023-10-09 ENCOUNTER — TRANSITIONAL CARE MANAGEMENT (OUTPATIENT)
Dept: INTERNAL MEDICINE CLINIC | Facility: OTHER | Age: 78
End: 2023-10-09

## 2023-10-09 NOTE — TELEPHONE ENCOUNTER
Surgeon/Procedure/Date: Jazlyn 10/5/23  Post op appt: 10/24/23          1. Diet: (full liquid diet for 4-5 days following discharge/transition to soft foods on day 5)           (continue to take PPI until follow up visit. Sit upright for 4 hours after eating, prior to going to bed)           (nausea, vomiting, difficulty or pain with swallowing?)  Doing well with liquids. Denies nausea or vomiting. No problems swallowing  2. Constipation: (Yes: colace 100 bid, senokot 2 tabs qhs or senokot S 2 tabs qhs. No BM: Dulcolax/Miralax/suppository)    Denies    3. Pain Management: (Tylenol 650 mg q6 hrs, Oxycodone 5-10 mg q4-6 hrs prn, +/- Advil 600 mg TID prn)    Using Tylenol. Pain well controlled. 4. Fever/Chills/Cough/Shortness of breath: (Call for temps over 100.4)  Denies fever, chills, cough or SOB. 5. Incisions: (Redness/warmth/drainage? Shower okay, no baths)  Clean and dry. Showering      6. Activity: (Walking/stationary biking. No lifting over 10-15 lbs)  Walking around house frequently. Reviewed post-op appointment date and time.

## 2023-10-10 ENCOUNTER — TELEMEDICINE (OUTPATIENT)
Age: 78
End: 2023-10-10
Payer: MEDICARE

## 2023-10-10 VITALS
WEIGHT: 135 LBS | HEART RATE: 79 BPM | BODY MASS INDEX: 23.92 KG/M2 | TEMPERATURE: 98 F | DIASTOLIC BLOOD PRESSURE: 80 MMHG | HEIGHT: 63 IN | SYSTOLIC BLOOD PRESSURE: 130 MMHG

## 2023-10-10 DIAGNOSIS — E03.9 ACQUIRED HYPOTHYROIDISM: ICD-10-CM

## 2023-10-10 DIAGNOSIS — I10 HYPERTENSION, ESSENTIAL: ICD-10-CM

## 2023-10-10 DIAGNOSIS — K21.00 GASTROESOPHAGEAL REFLUX DISEASE WITH ESOPHAGITIS WITHOUT HEMORRHAGE: Primary | ICD-10-CM

## 2023-10-10 DIAGNOSIS — M81.0 AGE-RELATED OSTEOPOROSIS WITHOUT CURRENT PATHOLOGICAL FRACTURE: ICD-10-CM

## 2023-10-10 DIAGNOSIS — Z13.820 SCREENING FOR OSTEOPOROSIS: ICD-10-CM

## 2023-10-10 DIAGNOSIS — K44.9 PARAESOPHAGEAL HERNIA: ICD-10-CM

## 2023-10-10 PROCEDURE — 99214 OFFICE O/P EST MOD 30 MIN: CPT | Performed by: INTERNAL MEDICINE

## 2023-10-10 PROCEDURE — 99496 TRANSJ CARE MGMT HIGH F2F 7D: CPT | Performed by: INTERNAL MEDICINE

## 2023-10-10 NOTE — ASSESSMENT & PLAN NOTE
Postop visit. Progressing well. Advancing diet as recommended. Denies any excessive pain. Managed well with Tylenol.

## 2023-10-10 NOTE — PROGRESS NOTES
Virtual Regular Visit    Verification of patient location:    Patient is located at Home in the following state in which I hold an active license [place]      Assessment/Plan:    Problem List Items Addressed This Visit        Digestive    Gastroesophageal reflux disease with esophagitis without hemorrhage - Primary     Postop visit. Progressing well. Advancing diet as recommended. Denies any excessive pain. Managed well with Tylenol. Paraesophageal hernia     S/P repair            Endocrine    Hypothyroid     Continue supplementation            Cardiovascular and Mediastinum    Hypertension, essential     Well managed by patient with regular monitoring and dose adjustment        Other Visit Diagnoses     Screening for osteoporosis        Relevant Orders    DXA bone density spine hip and pelvis    Age-related osteoporosis without current pathological fracture        Relevant Orders    DXA bone density spine hip and pelvis               Reason for visit is   Chief Complaint   Patient presents with   • Virtual Tcm     Discharged from [facility]  10/6 Hital hernia   • Virtual Regular Visit        Encounter provider Sandra Rudolph MD    Provider located at [address] [place] [address] Rd.  [address] [address] [phone]      Recent Visits  No visits were found meeting these conditions. Showing recent visits within past 7 days and meeting all other requirements  Today's Visits  Date Type Provider Dept   10/10/23 Telemedicine Sandra Rudolph MD [address] [facility] today's visits and meeting all other requirements  Future Appointments  No visits were found meeting these conditions. Showing future appointments within next 150 days and meeting all other requirements       The patient was identified by name and date of birth.  Jian Knowles was informed that this is a telemedicine visit and that the visit is being conducted through the [employer] platform. She agrees to proceed. .  My office door was closed. No one else was in the room. She acknowledged consent and understanding of privacy and security of the video platform. The patient has agreed to participate and understands they can discontinue the visit at any time. Patient is aware this is a billable service. Deepika Damon is a 78 y.o. female  . HPI     47-year-old lady admitted for laparoscopy repair of a large symptomatic paraesophageal hernia   Repeat barium swallow on 10 /6 showed no perforation of the esophagus. Her diet was advanced from clear liquids slowly which she tolerated and was subsequently discharged. Today she feels well and has no complaints. She is progressing slowly with clear liquids and soups and clear broths. She has minimal pain she is up and about when walking around the house without difficulty. Past Medical History:   Diagnosis Date   • LYLE (acute kidney injury) ([address]) 3/22/2021   • Dehydration after exertion 3/25/2021   • Disease of thyroid gland    • Hypertension    • Osteopenia     last assessed 12/17/13   • PONV (postoperative nausea and vomiting)    • Syncope and collapse 3/22/2021   • Vaginal atrophy        Past Surgical History:   Procedure Laterality Date   • COLONOSCOPY     • DILATION AND CURETTAGE OF UTERUS     • GASTROPLASTY N/A 10/5/2023    Procedure: Lorretta Severe;  Surgeon: Brenda Pollack MD;  Location: [facility];  Service: Thoracic   • CT ESOPHAGOGASTRODUODENOSCOPY TRANSORAL DIAGNOSTIC N/A 10/5/2023    Procedure: ESOPHAGOGASTRODUODENOSCOPY (EGD);   Surgeon: Brenda Pollack MD;  Location: [facility];  Service: Thoracic   • CT LAPS RPR PARAESPHGL HRNA INCL FUNDPLSTY W/O MESH N/A 10/5/2023    Procedure: laparoscopic paraesophageal hernia repair, with mesh, fundoplication;  Surgeon: Brenda Pollack MD;  Location: [facility];  Service: Thoracic • TUBAL LIGATION         Current Outpatient Medications   Medication Sig Dispense Refill   • Cholecalciferol (VITAMIN D3) 2000 units CHEW Chew 2 each daily      • COD LIVER OIL PO Take by mouth 1 tsp daily     • cyanocobalamin (VITAMIN B-12) 1000 MCG tablet Take 1,000 mcg by mouth daily     • levothyroxine (Synthroid) 75 mcg tablet Take 1 tablet (75 mcg total) by mouth daily 90 tablet 1   • mometasone (ELOCON) 0.1 % cream Apply topically daily as needed (allergies) 45 g 1   • pantoprazole (PROTONIX) 40 mg tablet Take 1 tablet (40 mg total) by mouth daily 60 tablet 0     No current facility-administered medications for this visit. Allergies   Allergen Reactions   • Other Allergic Rhinitis     Seasonal allergies        Review of Systems   Gastrointestinal:        Mild postoperative discomfort, tolerating well. Allergic/Immunologic: Positive for environmental allergies. Video Exam      Vitals:    10/10/23 1456   BP: 130/80   BP Location: Left arm   Pulse: 79   Temp: 98 °F (36.7 °C)   Weight: 61.2 kg (135 lb)   Height: 5' 3" (1.6 m)       Physical Exam     Physical Exam   Constitutional: She is oriented to person, place, and time. She appears well-developed and well-nourished. No distress. HENT:   Head: Normocephalic and atraumatic. Neck: Normal range of motion. Neck supple. Pulmonary/Chest: Effort normal. No respiratory distress. Musculoskeletal: Normal range of motion. Neurological: She is alert and oriented to person, place, and time. Skin: She is not diaphoretic. Psychiatric: She has a normal mood and affect.  Her behavior is normal. Judgment and thought content normal.      I spent 15 minutes directly with the patient during this visit  today in which greater than 50% of this time was spent in counseling/coordination of care regarding Diagnostic results, Prognosis, Risks and benefits of tx options, Intructions for management, Patient and family education, Importance of tx compliance, Risk factor reductions and Impressions.       Visit Time  Total Visit Duration: 15 min

## 2023-10-15 ENCOUNTER — NURSE TRIAGE (OUTPATIENT)
Dept: OTHER | Facility: OTHER | Age: 78
End: 2023-10-15

## 2023-10-15 NOTE — TELEPHONE ENCOUNTER
Regarding: Constipation  ----- Message from Stanley Bee sent at 10/15/2023 11:19 AM EDT -----  " Marena Meckel called in patient has been constipation on Friday and patient has not had a bowel movement , tried  Dulcolax yesterday and this morning "

## 2023-10-15 NOTE — TELEPHONE ENCOUNTER
On-call provider Dr. Robert Casper notified via [employer] to return patient's daughter's call, regarding surgery from 10/5. Phone number provided.

## 2023-10-19 DIAGNOSIS — K44.9 PARAESOPHAGEAL HERNIA: Primary | ICD-10-CM

## 2023-10-20 ENCOUNTER — TELEPHONE (OUTPATIENT)
Dept: SURGICAL ONCOLOGY | Facility: CLINIC | Age: 78
End: 2023-10-20

## 2023-10-20 NOTE — TELEPHONE ENCOUNTER
I spoke to pt in regards to post op appointment on 10/24/23 with Kaya Rae. I informed pt that she will need to get a CXR prior to this appointment. Pt verbalized understanding and was appreciative of the call.

## 2023-10-21 ENCOUNTER — APPOINTMENT (OUTPATIENT)
Dept: RADIOLOGY | Facility: MEDICAL CENTER | Age: 78
End: 2023-10-21
Payer: MEDICARE

## 2023-10-21 DIAGNOSIS — K44.9 PARAESOPHAGEAL HERNIA: ICD-10-CM

## 2023-10-21 PROCEDURE — 71046 X-RAY EXAM CHEST 2 VIEWS: CPT

## 2023-10-23 NOTE — PROGRESS NOTES
Thoracic Follow-Up  Assessment/Plan:    Paraesophageal hernia  Ms Ryland Harman is recover well about three weeks out from a laparoscopic PEHR with mesh, Daniel gastroplasty and Nissen fundoplication on 73/3/3666. Her incisions are well healed. She is tolerating a soft food diet. Her pain is controlled without medication. She can slowly advance to a normal diet. She should continue her Protonix for a total of two months after surgery (12/5/2023). She will come back in 4 weeks for a second post-op appointment. All questions were answered and she is in agreement with this plan       Diagnoses and all orders for this visit:    Paraesophageal hernia    Gastroesophageal reflux disease with esophagitis without hemorrhage          Thoracic History     Diagnosis: Paraesophageal hernia, GERD  Procedure: 10/5/2023 laparoscopic PEHR with mesh, Daniel gastroplasty, Nissen fundoplication     Subjective:    Patient ID: Marleen Arango is a 78 y.o. female. HPI  Ms Ryland Harman is an 78year old female who is s/p a laparoscopic PEHR with mesh, Daniel gastroplasty, Nissen fundoplication on 83/0/5009. She had an uncomplicated hospital course, barium swallow on POD1 showed no evidence of a leak, she tolerated full liquid diet and was discharged to home on 10/6/2023. She presents today for her first post-op visit. CXR on 10/21/2023 was personally reviewed by myself in PACS and reveals no acte cardiopulmonary disease, a trace right effusion. Stomach below the diaphragm. On discussion she is doing very well. She has minimal pain on no pain medications. Her incisions are healing well. She is eating soft foods like fish, chicken noodle soup and smoothies. She denies dysphagia, nausea, vomitting.      The following portions of the patient's history were reviewed and updated as appropriate: allergies, current medications, past family history, past medical history, past social history, past surgical history, and problem list.    Review of Systems Constitutional:  Negative for chills, diaphoresis and unexpected weight change. HENT: Negative. Eyes: Negative. Respiratory:  Negative for cough, shortness of breath and wheezing. Cardiovascular:  Negative for chest pain and leg swelling. Gastrointestinal:  Negative for abdominal pain, diarrhea and nausea. Endocrine: Negative. Genitourinary: Negative. Musculoskeletal: Negative. Skin: Negative. Allergic/Immunologic: Negative. Neurological: Negative. Hematological:  Negative for adenopathy. Does not bruise/bleed easily. Psychiatric/Behavioral: Negative. All other systems reviewed and are negative. Objective:   Physical Exam  Vitals reviewed. Constitutional:       General: She is not in acute distress. Appearance: Normal appearance. She is not ill-appearing. HENT:      Head: Normocephalic. Nose: Nose normal.      Mouth/Throat:      Mouth: Mucous membranes are moist.   Eyes:      Pupils: Pupils are equal, round, and reactive to light. Cardiovascular:      Rate and Rhythm: Normal rate and regular rhythm. Heart sounds: Normal heart sounds. Pulmonary:      Effort: Pulmonary effort is normal.      Breath sounds: Normal breath sounds. No wheezing, rhonchi or rales. Abdominal:      Palpations: Abdomen is soft. Comments: Surgical incisions well healed   Musculoskeletal:         General: No swelling. Normal range of motion. Lymphadenopathy:      Cervical: No cervical adenopathy. Skin:     General: Skin is warm. Neurological:      General: No focal deficit present. Mental Status: She is alert and oriented to person, place, and time.    Psychiatric:         Mood and Affect: Mood normal.     /79 (BP Location: Left arm, Patient Position: Sitting, Cuff Size: Standard)   Pulse 77   Temp 98 °F (36.7 °C) (Temporal)   Resp 14   Ht 5' 3" (1.6 m)   Wt 60.3 kg (132 lb 15 oz)   SpO2 100%   BMI 23.55 kg/m²     XR CHEST PA & LATERAL    Result Date: 6/10/2023  Impression IMPRESSION: No pneumonia or CHF detected. Workstation:[id number]     No CT Chest results available for this patient. No CT Chest,Abdomen,Pelvis results available for this patient. No NM PET CT results available for this patient. FL esophagram complete    Result Date: 10/6/2023  Narrative BARIUM SWALLOW-ESOPHAGRAM INDICATION:   s/p PEHR and Nissen. COMPARISON: CT chest 8/29/2023. IMAGES: 175 FLUOROSCOPY TIME:   1 minute 18 seconds TECHNIQUE: Limited esophagram was performed. The patient was given water-soluble contrast by mouth and images of the esophagus were obtained. Once no leak was identified with water-soluble contrast, the patient was given barium by mouth for confirmation. FINDINGS: Postsurgical changes of paraesophageal hernia repair and Nissen fundoplication. No leak is identified. Abnormal esophageal motility with tertiary contractions. Cine evaluation of the cervical esophagus was performed. No aspiration or laryngeal vestibule penetration. No nasopharyngeal reflux. Gastroesophageal reflux was not observed. There is no recurrent hiatal hernia. Impression No leak identified status post paraesophageal hernia repair and Nissen fundoplication. Abnormal esophageal motility with tertiary contractions, likely representing presbyesophagus.  Workstation performed: [id number]

## 2023-10-24 ENCOUNTER — OFFICE VISIT (OUTPATIENT)
Dept: CARDIAC SURGERY | Facility: CLINIC | Age: 78
End: 2023-10-24

## 2023-10-24 VITALS
OXYGEN SATURATION: 100 % | TEMPERATURE: 98 F | WEIGHT: 132.94 LBS | HEIGHT: 63 IN | HEART RATE: 77 BPM | DIASTOLIC BLOOD PRESSURE: 79 MMHG | SYSTOLIC BLOOD PRESSURE: 138 MMHG | BODY MASS INDEX: 23.55 KG/M2 | RESPIRATION RATE: 14 BRPM

## 2023-10-24 DIAGNOSIS — K21.00 GASTROESOPHAGEAL REFLUX DISEASE WITH ESOPHAGITIS WITHOUT HEMORRHAGE: ICD-10-CM

## 2023-10-24 DIAGNOSIS — K44.9 PARAESOPHAGEAL HERNIA: Primary | ICD-10-CM

## 2023-10-24 PROCEDURE — 99024 POSTOP FOLLOW-UP VISIT: CPT | Performed by: THORACIC SURGERY (CARDIOTHORACIC VASCULAR SURGERY)

## 2023-10-24 NOTE — ASSESSMENT & PLAN NOTE
Ms Jhon Deng is recover well about three weeks out from a laparoscopic PEHR with mesh, Daniel gastroplasty and Nissen fundoplication on 38/2/5821. Her incisions are well healed. She is tolerating a soft food diet. Her pain is controlled without medication. She can slowly advance to a normal diet. She should continue her Protonix for a total of two months after surgery (12/5/2023). She will come back in 4 weeks for a second post-op appointment.  All questions were answered and she is in agreement with this plan

## 2023-10-25 ENCOUNTER — TELEPHONE (OUTPATIENT)
Dept: HEMATOLOGY ONCOLOGY | Facility: CLINIC | Age: 78
End: 2023-10-25

## 2023-10-25 NOTE — TELEPHONE ENCOUNTER
Patient Call    Who are you speaking with? Child    If it is not the patient, are they listed on an active communication consent form? Yes   What is the reason for this call? Patients daughter would like to inform Dr. Bakari Fischer the patient has been constipated for the last few days she took a dose of miralax and 2colace tabs. She has not gone to the bathroom yet    Does this require a call back? yes   If a call back is required, please list best call back number [phone]   If a call back is required, advise that a message will be forwarded to their care team and someone will return their call as soon as possible. Did you relay this information to the patient?  yes

## 2023-10-25 NOTE — TELEPHONE ENCOUNTER
I spoke with Patient's daughter. Patient took one Dulcolax suppository this am, effective for small amount of hard stool. Advised to try Miralax this afternoon and 2 colace at hs. Increase fluids may try some salad today. If no BM by tomorrow may try MOM . Patient's daughter spoke with Dr. Esteban Massey on Sunday and she told daughter she may give her MOM if nothing is working. Advised to call office if no improvement with above directions.

## 2023-11-20 ENCOUNTER — OFFICE VISIT (OUTPATIENT)
Dept: CARDIAC SURGERY | Facility: CLINIC | Age: 78
End: 2023-11-20

## 2023-11-20 VITALS
HEIGHT: 63 IN | WEIGHT: 133.6 LBS | OXYGEN SATURATION: 98 % | DIASTOLIC BLOOD PRESSURE: 75 MMHG | TEMPERATURE: 97.7 F | RESPIRATION RATE: 14 BRPM | SYSTOLIC BLOOD PRESSURE: 160 MMHG | HEART RATE: 71 BPM | BODY MASS INDEX: 23.67 KG/M2

## 2023-11-20 DIAGNOSIS — K44.9 PARAESOPHAGEAL HERNIA: Primary | ICD-10-CM

## 2023-11-20 PROCEDURE — 99024 POSTOP FOLLOW-UP VISIT: CPT | Performed by: PHYSICIAN ASSISTANT

## 2023-11-20 NOTE — PROGRESS NOTES
Thoracic Follow-Up  Assessment/Plan:    Paraesophageal hernia  Ms Stephanie Blanco presents for her second post-op visit following a  laparoscopic PEHR with mesh, Daniel gastroplasty, Nissen fundoplication on 48/5/8151. She is doing excellent. Tolerating a regular diet without difficulty. She will continue her Protonix for 8 weeks post-operatively through until 12/5/2023. She will continue with colace for constipation, should she continue with constipation she will schedule a follow-up with Dr La Braswell to address. We will see her back in April for a 6 month post-op visit with a CXR. She will call us in the meantime with questions or concerns. All questions were answered and she is in agreement with this plan        Diagnoses and all orders for this visit:    Paraesophageal hernia  -     XR chest pa & lateral; Future          Thoracic History       Diagnosis: Paraesophageal hernia, GERD  Procedure: 10/5/2023 laparoscopic PEHR with mesh, Daniel gastroplasty, Nissen fundoplication      Subjective:    Patient ID: Miguel Mondragon is a 78 y.o. female. HPI  Ms Stephanie Blanco is an 78year old female who presents for a second post-op appointment after a laparoscopic PEHR with mesh, Daniel gastroplasty, Nissen fundoplication on 70/8/0541. She was last seen in our office on 10/24/2023 at which point she was doing well, tolerating a soft food diet and advanced to a regular diet. On discussion today she feels very well She is toleraing a regular diet without difficulty. She denies dysphagia, nausea, vomiting, gas, heartburn. She has an occasional ppain at her right hand abdominal incision. She does have constipation which has improved with daily Colace. She denies fevers/chills or recent illness.      She had no 30 day readmission for hospital discharge    The following portions of the patient's history were reviewed and updated as appropriate: allergies, current medications, past family history, past medical history, past social history, past surgical history, and problem list.    Review of Systems   Constitutional:  Negative for chills, diaphoresis and unexpected weight change. HENT: Negative. Eyes: Negative. Respiratory:  Negative for cough, shortness of breath and wheezing. Cardiovascular:  Negative for chest pain and leg swelling. Gastrointestinal:  Negative for abdominal pain, diarrhea and nausea. Endocrine: Negative. Genitourinary: Negative. Musculoskeletal: Negative. Skin: Negative. Allergic/Immunologic: Negative. Neurological: Negative. Hematological:  Negative for adenopathy. Does not bruise/bleed easily. Psychiatric/Behavioral: Negative. All other systems reviewed and are negative. Objective:   Physical Exam  Vitals reviewed. Constitutional:       General: She is not in acute distress. Appearance: Normal appearance. She is not ill-appearing. HENT:      Head: Normocephalic. Nose: Nose normal.      Mouth/Throat:      Mouth: Mucous membranes are moist.   Eyes:      Pupils: Pupils are equal, round, and reactive to light. Cardiovascular:      Rate and Rhythm: Normal rate and regular rhythm. Heart sounds: Normal heart sounds. Pulmonary:      Effort: Pulmonary effort is normal.      Breath sounds: Normal breath sounds. No wheezing, rhonchi or rales. Abdominal:      Palpations: Abdomen is soft. Comments: Surgical incisions well healed   Musculoskeletal:         General: No swelling. Normal range of motion. Lymphadenopathy:      Cervical: No cervical adenopathy. Skin:     General: Skin is warm. Neurological:      General: No focal deficit present. Mental Status: She is alert and oriented to person, place, and time.    Psychiatric:         Mood and Affect: Mood normal.     /75 (BP Location: Left arm, Patient Position: Sitting, Cuff Size: Standard)   Pulse 71   Temp 97.7 °F (36.5 °C) (Temporal)   Resp 14   Ht 5' 3" (1.6 m)   Wt 60.6 kg (133 lb 9.6 oz) SpO2 98%   BMI 23.67 kg/m²     XR chest pa & lateral    Result Date: 10/23/2023  Impression Lungs clear. Trace right effusion. Workstation performed: [id number]     XR CHEST PA & LATERAL    Result Date: 6/10/2023  Impression IMPRESSION: No pneumonia or CHF detected. Workstation:[id number]     No CT Chest results available for this patient. No CT Chest,Abdomen,Pelvis results available for this patient. No NM PET CT results available for this patient. FL esophagram complete    Result Date: 10/6/2023  Narrative BARIUM SWALLOW-ESOPHAGRAM INDICATION:   s/p PEHR and Nissen. COMPARISON: CT chest 8/29/2023. IMAGES: 175 FLUOROSCOPY TIME:   1 minute 18 seconds TECHNIQUE: Limited esophagram was performed. The patient was given water-soluble contrast by mouth and images of the esophagus were obtained. Once no leak was identified with water-soluble contrast, the patient was given barium by mouth for confirmation. FINDINGS: Postsurgical changes of paraesophageal hernia repair and Nissen fundoplication. No leak is identified. Abnormal esophageal motility with tertiary contractions. Cine evaluation of the cervical esophagus was performed. No aspiration or laryngeal vestibule penetration. No nasopharyngeal reflux. Gastroesophageal reflux was not observed. There is no recurrent hiatal hernia. Impression No leak identified status post paraesophageal hernia repair and Nissen fundoplication. Abnormal esophageal motility with tertiary contractions, likely representing presbyesophagus.  Workstation performed: [id number]

## 2023-11-20 NOTE — ASSESSMENT & PLAN NOTE
Ms Mary Dash presents for her second post-op visit following a  laparoscopic PEHR with mesh, Daniel gastroplasty, Nissen fundoplication on 32/1/3925. She is doing excellent. Tolerating a regular diet without difficulty. She will continue her Protonix for 8 weeks post-operatively through until 12/5/2023. She will continue with colace for constipation, should she continue with constipation she will schedule a follow-up with Dr Isiah Gardner to address. We will see her back in April for a 6 month post-op visit with a CXR. She will call us in the meantime with questions or concerns.  All questions were answered and she is in agreement with this plan

## 2023-12-11 ENCOUNTER — TELEPHONE (OUTPATIENT)
Dept: HEMATOLOGY ONCOLOGY | Facility: CLINIC | Age: 78
End: 2023-12-11

## 2023-12-11 NOTE — TELEPHONE ENCOUNTER
Patient Call    Who are you speaking with? Child    If it is not the patient, are they listed on an active communication consent form? Yes   What is the reason for this call? Pt has pain at incision spot   Does this require a call back? Yes   If a call back is required, please list [facility] [[email or web address]] call back number [phone]    If a call back is required, advise that a message will be forwarded to their care team and someone will return their call as soon as possible. Did you relay this information to the patient?  Yes

## 2023-12-11 NOTE — TELEPHONE ENCOUNTER
I discussed with Mikey Hough and her daughter. Patient has incisional pain at incision to the left of the supraumbilical incision (surgeon's right hand incision). The pain is tight and pulling, worse with coughing/laughing. I suspect this is likely from the fascial stitch that is continuing to heal. She will call back if this pain is not improving over the next few weeks and at that time we can have her come in for assessment.

## 2024-01-12 DIAGNOSIS — E01.8 IODINE HYPOTHYROIDISM: ICD-10-CM

## 2024-01-12 RX ORDER — LEVOTHYROXINE SODIUM 0.07 MG/1
75 TABLET ORAL DAILY
Qty: 90 TABLET | Refills: 1 | Status: SHIPPED | OUTPATIENT
Start: 2024-01-12

## 2024-01-22 ENCOUNTER — PATIENT MESSAGE (OUTPATIENT)
Age: 79
End: 2024-01-22

## 2024-01-22 DIAGNOSIS — R55 VASOVAGAL NEAR-SYNCOPE: Primary | ICD-10-CM

## 2024-02-16 ENCOUNTER — APPOINTMENT (OUTPATIENT)
Dept: LAB | Facility: MEDICAL CENTER | Age: 79
End: 2024-02-16
Payer: MEDICARE

## 2024-02-16 DIAGNOSIS — R73.09 ELEVATED HEMOGLOBIN A1C: ICD-10-CM

## 2024-02-16 DIAGNOSIS — I10 HYPERTENSION, ESSENTIAL: ICD-10-CM

## 2024-02-16 LAB
ALBUMIN SERPL BCP-MCNC: 4.2 G/DL (ref 3.5–5)
ALP SERPL-CCNC: 63 U/L (ref 34–104)
ALT SERPL W P-5'-P-CCNC: 22 U/L (ref 7–52)
ANION GAP SERPL CALCULATED.3IONS-SCNC: 7 MMOL/L
AST SERPL W P-5'-P-CCNC: 26 U/L (ref 13–39)
BACTERIA UR QL AUTO: ABNORMAL /HPF
BASOPHILS # BLD AUTO: 0.03 THOUSANDS/ÂΜL (ref 0–0.1)
BASOPHILS NFR BLD AUTO: 1 % (ref 0–1)
BILIRUB SERPL-MCNC: 0.85 MG/DL (ref 0.2–1)
BILIRUB UR QL STRIP: NEGATIVE
BUN SERPL-MCNC: 19 MG/DL (ref 5–25)
CALCIUM SERPL-MCNC: 9.1 MG/DL (ref 8.4–10.2)
CHLORIDE SERPL-SCNC: 101 MMOL/L (ref 96–108)
CHOLEST SERPL-MCNC: 254 MG/DL
CLARITY UR: CLEAR
CO2 SERPL-SCNC: 29 MMOL/L (ref 21–32)
COLOR UR: COLORLESS
CREAT SERPL-MCNC: 0.63 MG/DL (ref 0.6–1.3)
EOSINOPHIL # BLD AUTO: 0.21 THOUSAND/ÂΜL (ref 0–0.61)
EOSINOPHIL NFR BLD AUTO: 6 % (ref 0–6)
ERYTHROCYTE [DISTWIDTH] IN BLOOD BY AUTOMATED COUNT: 13.3 % (ref 11.6–15.1)
EST. AVERAGE GLUCOSE BLD GHB EST-MCNC: 123 MG/DL
GFR SERPL CREATININE-BSD FRML MDRD: 84 ML/MIN/1.73SQ M
GLUCOSE P FAST SERPL-MCNC: 80 MG/DL (ref 65–99)
GLUCOSE UR STRIP-MCNC: NEGATIVE MG/DL
HBA1C MFR BLD: 5.9 %
HCT VFR BLD AUTO: 40.6 % (ref 34.8–46.1)
HDLC SERPL-MCNC: 117 MG/DL
HGB BLD-MCNC: 13.3 G/DL (ref 11.5–15.4)
HGB UR QL STRIP.AUTO: NEGATIVE
IMM GRANULOCYTES # BLD AUTO: 0.01 THOUSAND/UL (ref 0–0.2)
IMM GRANULOCYTES NFR BLD AUTO: 0 % (ref 0–2)
KETONES UR STRIP-MCNC: NEGATIVE MG/DL
LDLC SERPL CALC-MCNC: 125 MG/DL (ref 0–100)
LEUKOCYTE ESTERASE UR QL STRIP: ABNORMAL
LYMPHOCYTES # BLD AUTO: 1.29 THOUSANDS/ÂΜL (ref 0.6–4.47)
LYMPHOCYTES NFR BLD AUTO: 35 % (ref 14–44)
MCH RBC QN AUTO: 32 PG (ref 26.8–34.3)
MCHC RBC AUTO-ENTMCNC: 32.8 G/DL (ref 31.4–37.4)
MCV RBC AUTO: 98 FL (ref 82–98)
MONOCYTES # BLD AUTO: 0.44 THOUSAND/ÂΜL (ref 0.17–1.22)
MONOCYTES NFR BLD AUTO: 12 % (ref 4–12)
MUCOUS THREADS UR QL AUTO: ABNORMAL
NEUTROPHILS # BLD AUTO: 1.66 THOUSANDS/ÂΜL (ref 1.85–7.62)
NEUTS SEG NFR BLD AUTO: 46 % (ref 43–75)
NITRITE UR QL STRIP: NEGATIVE
NON-SQ EPI CELLS URNS QL MICRO: ABNORMAL /HPF
NRBC BLD AUTO-RTO: 0 /100 WBCS
PH UR STRIP.AUTO: 6.5 [PH]
PLATELET # BLD AUTO: 237 THOUSANDS/UL (ref 149–390)
PMV BLD AUTO: 10.6 FL (ref 8.9–12.7)
POTASSIUM SERPL-SCNC: 4 MMOL/L (ref 3.5–5.3)
PROT SERPL-MCNC: 6.5 G/DL (ref 6.4–8.4)
PROT UR STRIP-MCNC: NEGATIVE MG/DL
RBC # BLD AUTO: 4.15 MILLION/UL (ref 3.81–5.12)
RBC #/AREA URNS AUTO: ABNORMAL /HPF
SODIUM SERPL-SCNC: 137 MMOL/L (ref 135–147)
SP GR UR STRIP.AUTO: 1.01 (ref 1–1.03)
TRIGL SERPL-MCNC: 58 MG/DL
TSH SERPL DL<=0.05 MIU/L-ACNC: 2.38 UIU/ML (ref 0.45–4.5)
UROBILINOGEN UR STRIP-ACNC: <2 MG/DL
WBC # BLD AUTO: 3.64 THOUSAND/UL (ref 4.31–10.16)
WBC #/AREA URNS AUTO: ABNORMAL /HPF

## 2024-02-16 PROCEDURE — 80053 COMPREHEN METABOLIC PANEL: CPT

## 2024-02-16 PROCEDURE — 36415 COLL VENOUS BLD VENIPUNCTURE: CPT

## 2024-02-16 PROCEDURE — 85025 COMPLETE CBC W/AUTO DIFF WBC: CPT

## 2024-02-16 PROCEDURE — 84443 ASSAY THYROID STIM HORMONE: CPT

## 2024-02-16 PROCEDURE — 80061 LIPID PANEL: CPT

## 2024-02-16 PROCEDURE — 83036 HEMOGLOBIN GLYCOSYLATED A1C: CPT

## 2024-02-18 LAB
BACTERIA UR CULT: ABNORMAL
BACTERIA UR CULT: ABNORMAL

## 2024-02-20 ENCOUNTER — OFFICE VISIT (OUTPATIENT)
Age: 79
End: 2024-02-20
Payer: MEDICARE

## 2024-02-20 VITALS
BODY MASS INDEX: 23.92 KG/M2 | DIASTOLIC BLOOD PRESSURE: 86 MMHG | WEIGHT: 135 LBS | TEMPERATURE: 97.8 F | SYSTOLIC BLOOD PRESSURE: 168 MMHG | OXYGEN SATURATION: 100 % | HEART RATE: 72 BPM | HEIGHT: 63 IN

## 2024-02-20 DIAGNOSIS — E78.00 HYPERCHOLESTEROLEMIA: ICD-10-CM

## 2024-02-20 DIAGNOSIS — E55.9 VITAMIN D DEFICIENCY: ICD-10-CM

## 2024-02-20 DIAGNOSIS — I10 HYPERTENSION, ESSENTIAL: Primary | ICD-10-CM

## 2024-02-20 DIAGNOSIS — E03.9 ACQUIRED HYPOTHYROIDISM: ICD-10-CM

## 2024-02-20 DIAGNOSIS — D72.819 LEUKOPENIA, UNSPECIFIED TYPE: ICD-10-CM

## 2024-02-20 DIAGNOSIS — R31.9 URINARY TRACT INFECTION WITH HEMATURIA, SITE UNSPECIFIED: ICD-10-CM

## 2024-02-20 DIAGNOSIS — N39.0 URINARY TRACT INFECTION WITH HEMATURIA, SITE UNSPECIFIED: ICD-10-CM

## 2024-02-20 DIAGNOSIS — K44.9 PARAESOPHAGEAL HERNIA: ICD-10-CM

## 2024-02-20 PROBLEM — R55 VASOVAGAL NEAR-SYNCOPE: Status: RESOLVED | Noted: 2021-03-22 | Resolved: 2024-02-20

## 2024-02-20 PROCEDURE — 99214 OFFICE O/P EST MOD 30 MIN: CPT | Performed by: INTERNAL MEDICINE

## 2024-02-20 PROCEDURE — 96372 THER/PROPH/DIAG INJ SC/IM: CPT

## 2024-02-20 RX ORDER — CYANOCOBALAMIN 1000 UG/ML
1000 INJECTION, SOLUTION INTRAMUSCULAR; SUBCUTANEOUS
Status: SHIPPED | OUTPATIENT
Start: 2024-02-20

## 2024-02-20 RX ADMIN — CYANOCOBALAMIN 1000 MCG: 1000 INJECTION, SOLUTION INTRAMUSCULAR; SUBCUTANEOUS at 11:27

## 2024-02-20 NOTE — PROGRESS NOTES
Name: Elin Engel      : 1945      MRN: [id number]  Encounter Provider: Destiny Carter MD  Encounter Date: 2024   Encounter department: [facility] [place]    Assessment & Plan    Counseled at great length regarding elevated BP and to monitor it regularly.  Suggestions were provided for adequate bladder emptying and hygiene.    1. Hypertension, essential  Assessment & Plan:  Presently not on meds.  Hypertension is labile always elevated when she is at the office  Continue present lifestyle.  Monitor BP at home  May be wise to restart the Benicar at a lower dose      2. Hypercholesterolemia  Assessment & Plan:  Adequately controlled with present lifestyle and dietary management.  No meds prescribed due to HDL being adequately high        3. Urinary tract infection with hematuria, site unspecified  Assessment & Plan:  Sub clinical, asymptomatic.  Recommended regular cleaning , moist towelettes.    Orders:  -     UA w Reflex to Microscopic w Reflex to Culture; Future    4. Leukopenia, unspecified type  Assessment & Plan:  Mild reduction in WBC level.  Does tend to be cyclic.  Will monitor B12 levels as well    Orders:  -     Methylmalonic acid, serum; Future  -     CBC and differential; Future  -     cyanocobalamin injection 1,000 mcg    5. Acquired hypothyroidism  Assessment & Plan:  On supplementation, will continue to monitor      6. Paraesophageal hernia  Assessment & Plan:  S/P surgical correction with improvement in symptoms      7. Vitamin D deficiency  -     Vitamin D 25 hydroxy; Future       I have spent greater than 35 minutes with Patient  today in which greater than 50% of this time was spent in counseling/coordination of care regarding Diagnostic results, Prognosis, Risks and benefits of tx options, Intructions for management, Patient and family education, Importance of tx compliance, Risk factor reductions and Impressions.     Subjective     Chief Complaint    Patient presents with   • Follow-up     Labs done 02/16/2024      HPI    Elin has been doing well since her surgery for the paraesophageal hiatal hernia.  Her symptoms have completely resolved overall feels great.  She continues to small meals of the day.    She continues to take her medications hypothyroidism.    Present time she is not on any medication for hypertension.  Blood pressure checks at home are within normal limit.  She does admit to feeling anxious when she comes to the office.    Also had concerns about abnormal urine analysis but denies any symptoms      Review of Systems   Skin:         rosacea   All other systems reviewed and are negative.      Past Medical History:   Diagnosis Date   • LYLE (acute kidney injury) (HCC) 03/22/2021   • Dehydration after exertion 03/25/2021   • Disease of thyroid gland    • Hypertension    • Osteopenia     last assessed 12/17/13   • PONV (postoperative nausea and vomiting)    • Syncope and collapse 03/22/2021   • Vaginal atrophy    • Vasovagal near-syncope 03/22/2021     Past Surgical History:   Procedure Laterality Date   • COLONOSCOPY     • DILATION AND CURETTAGE OF UTERUS     • GASTROPLASTY N/A 10/5/2023    Procedure: GASTROPLASTY,ALY;  Surgeon: Puma Hobson MD;  [facility];  Service: Thoracic   • TX ESOPHAGOGASTRODUODENOSCOPY TRANSORAL DIAGNOSTIC N/A 10/5/2023    Procedure: ESOPHAGOGASTRODUODENOSCOPY (EGD);  Surgeon: Puma Hobson MD;  [facility];  Service: Thoracic   • TX LAPS RPR PARAESPHGL HRNA INCL FUNDPLSTY W/O MESH N/A 10/5/2023    Procedure: laparoscopic paraesophageal hernia repair, with mesh, fundoplication;  Surgeon: Puma Hobson MD;  [facility];  Service: Thoracic   • TUBAL LIGATION       Family History   Problem Relation Age of Onset   • Lymphoma Mother 70        acute   • Hyperlipidemia Father    • Peripheral vascular disease Father    • Breast cancer Sister    • Leukemia Brother     • Cancer Brother         leukemia   • Other Other         4 children living     Social History     Socioeconomic History   • Marital status:      Spouse name: Not on file   • Number of children: Not on file   • Years of education: Not on file   • Highest education level: Not on file   Occupational History   • Occupation:    Tobacco Use   • Smoking status: Former     Current packs/day: 0.00     Average packs/day: 0.3 packs/day for 9.0 years (2.3 ttl pk-yrs)     Types: Cigarettes     Start date:      Quit date: 1970     Years since quittin.1   • Smokeless tobacco: Never   • Tobacco comments:     about 45 years ago    Vaping Use   • Vaping status: Never Used   Substance and Sexual Activity   • Alcohol use: Not Currently     Comment: rare occasion   • Drug use: No   • Sexual activity: Not Currently   Other Topics Concern   • Not on file   Social History Narrative    Travel    She enjoys cooking    Denied hx of exercise habits     Social Determinants of Health     Financial Resource Strain: Patient Declined (2023)    Overall Financial Resource Strain (CARDIA)    • Difficulty of Paying Living Expenses: Patient declined   Food Insecurity: No Food Insecurity (10/6/2023)    Hunger Vital Sign    • Worried About Running Out of Food in the Last Year: Never true    • Ran Out of Food in the Last Year: Never true   Transportation Needs: No Transportation Needs (10/6/2023)    PRAPARE - Transportation    • Lack of Transportation (Medical): No    • Lack of Transportation (Non-Medical): No   Physical Activity: Not on file   Stress: Not on file   Social Connections: Not on file   Intimate Partner Violence: Not on file   Housing Stability: Low Risk  (10/6/2023)    Housing Stability Vital Sign    • Unable to Pay for Housing in the Last Year: No    • Number of Places Lived in the Last Year: 1    • Unstable Housing in the Last Year: No     Current Outpatient Medications on File Prior to Visit  "  Medication Sig   • Cholecalciferol (VITAMIN D3) 2000 units CHEW Chew 2 each daily    • COD LIVER OIL PO Take by mouth 1 tsp daily   • cyanocobalamin (VITAMIN B-12) 1000 MCG tablet Take 1,000 mcg by mouth daily   • levothyroxine (Synthroid) 75 mcg tablet Take 1 tablet (75 mcg total) by mouth daily   • mometasone (ELOCON) 0.1 % cream Apply topically daily as needed (allergies)   • [DISCONTINUED] pantoprazole (PROTONIX) 40 mg tablet Take 1 tablet (40 mg total) by mouth daily (Patient not taking: Reported on 2/20/2024)     Allergies   Allergen Reactions   • Other Allergic Rhinitis     Seasonal allergies      Immunization History   Administered Date(s) Administered   • COVID-19 PFIZER VACCINE 0.3 ML IM 01/19/2021, 02/08/2021, 11/06/2021   • Tdap 11/24/2013       Objective     /86 (BP Location: Left arm, Patient Position: Sitting, Cuff Size: Adult)   Pulse 72   Temp 97.8 °F (36.6 °C) (Temporal)   Ht 5' 3\" (1.6 m)   Wt 61.2 kg (135 lb)   SpO2 100% Comment: ra  BMI 23.91 kg/m²     Physical Exam    Gen: NAD  Heent: atraumatic, normocephalic  Mouth: is moist  Neck: is supple, no JVD, no carotid bruits.   Heart: is regular Normal s1, s2,  Lungs: are clear to auscultation    Ext: no edema, distal pulses normal  Skin: no rashes, ulcers  Mood: normal affect  Neuro: AAOx3   Destiny Carter MD    "

## 2024-02-20 NOTE — ASSESSMENT & PLAN NOTE
Presently not on meds.  Hypertension is labile always elevated when she is at the office  Continue present lifestyle.  Monitor BP at home  May be wise to restart the Benicar at a lower dose

## 2024-02-20 NOTE — ASSESSMENT & PLAN NOTE
Adequately controlled with present lifestyle and dietary management.  No meds prescribed due to HDL being adequately high

## 2024-02-21 PROBLEM — R31.9 URINARY TRACT INFECTION WITH HEMATURIA: Status: RESOLVED | Noted: 2024-02-20 | Resolved: 2024-02-21

## 2024-02-21 PROBLEM — N39.0 URINARY TRACT INFECTION WITH HEMATURIA: Status: RESOLVED | Noted: 2024-02-20 | Resolved: 2024-02-21

## 2024-04-09 ENCOUNTER — TELEPHONE (OUTPATIENT)
Dept: HEMATOLOGY ONCOLOGY | Facility: CLINIC | Age: 79
End: 2024-04-09

## 2024-04-09 NOTE — TELEPHONE ENCOUNTER
Appointment Change  Cancel, Reschedule, Change to Virtual      Who are you speaking with? Patient   If it is not the patient, is the caller listed on the communication consent form? Yes   Which provider is the appointment scheduled with? Dr. Hobson and Apoorva Whittaker PA-C   When was the original appointment scheduled?    Please list date and time 4/22/24   At which location is the appointment scheduled to take place? [place]   Was the appointment rescheduled?     Was the appointment changed from an in person visit to a virtual visit?    If so, please list the details of the change. 5/7/24   What is the reason for the appointment change? provider

## 2024-04-16 DIAGNOSIS — E01.8 IODINE HYPOTHYROIDISM: ICD-10-CM

## 2024-04-16 RX ORDER — LEVOTHYROXINE SODIUM 0.07 MG/1
75 TABLET ORAL DAILY
Qty: 90 TABLET | Refills: 0 | OUTPATIENT
Start: 2024-04-16

## 2024-04-30 ENCOUNTER — APPOINTMENT (OUTPATIENT)
Dept: RADIOLOGY | Facility: MEDICAL CENTER | Age: 79
End: 2024-04-30
Payer: MEDICARE

## 2024-04-30 DIAGNOSIS — K44.9 PARAESOPHAGEAL HERNIA: ICD-10-CM

## 2024-04-30 PROCEDURE — 71046 X-RAY EXAM CHEST 2 VIEWS: CPT

## 2024-05-07 ENCOUNTER — OFFICE VISIT (OUTPATIENT)
Dept: CARDIAC SURGERY | Facility: CLINIC | Age: 79
End: 2024-05-07
Payer: MEDICARE

## 2024-05-07 VITALS
SYSTOLIC BLOOD PRESSURE: 156 MMHG | HEART RATE: 95 BPM | WEIGHT: 137.13 LBS | OXYGEN SATURATION: 97 % | RESPIRATION RATE: 14 BRPM | TEMPERATURE: 98.1 F | HEIGHT: 63 IN | DIASTOLIC BLOOD PRESSURE: 80 MMHG | BODY MASS INDEX: 24.3 KG/M2

## 2024-05-07 DIAGNOSIS — K21.00 GASTROESOPHAGEAL REFLUX DISEASE WITH ESOPHAGITIS WITHOUT HEMORRHAGE: Primary | ICD-10-CM

## 2024-05-07 PROCEDURE — 99213 OFFICE O/P EST LOW 20 MIN: CPT | Performed by: THORACIC SURGERY (CARDIOTHORACIC VASCULAR SURGERY)

## 2024-05-07 RX ORDER — MINOCYCLINE HYDROCHLORIDE 50 MG/1
CAPSULE ORAL
COMMUNITY
Start: 2024-05-05

## 2024-05-07 RX ORDER — KETOCONAZOLE 20 MG/G
CREAM TOPICAL
COMMUNITY
Start: 2024-05-03

## 2024-05-07 NOTE — PROGRESS NOTES
Assessment/Plan:    Gastroesophageal reflux disease with esophagitis without hemorrhage  Ms. Engel is approximately 7 months out from laparoscopic paraesophageal hernia repair with mesh placement, Daniel gastroplasty, and Nissen fundoplication.  She has recovered well from a thoracic surgery standpoint.  She is able to tolerate all foods that she wants without difficulty.  Reinforced the recommendation of smaller more frequent meals, smaller bites, and chewing food well especially in times if she were to experience any return of preoperative symptoms.  Patient's recent chest x-ray was reviewed which demonstrates the stomach to be below the diaphragm.  Discussed with patient that she does not have any lifting restriction.  No formal follow-up will be scheduled at this time.  Patient instructed to call the office with any further questions or concerns or should she develop dysphagia or return of preoperative symptoms.  Patient in agreement with plan.       Diagnoses and all orders for this visit:    Gastroesophageal reflux disease with esophagitis without hemorrhage    Other orders  -     ketoconazole (NIZORAL) 2 % cream; APPLY TWO TIMES DAILY TO EYEBROWS AND NASAL BRIDGE  -     minocycline (MINOCIN) 50 mg capsule          Thoracic History     Diagnosis: Paraesophageal hernia, GERD  Procedure: 10/5/2023 laparoscopic PEHR with mesh, Daniel gastroplasty, Nissen fundoplication    Cancer Staging   No matching staging information was found for the patient.    Oncology History    No history exists.            Subjective:    Patient ID: Elin Engel is a 79 y.o. female.    HPI    Elin Engel is a 79 y.o. female presents today for a post operative visit. Patient is approximately 7 months out from laparoscopic paraesophageal hernia repair with mesh placement, Daniel gastroplasty, and Nissen fundoplication on 10/5/23.  At last office visit on 11/20/2023, patient was recovering well, tolerating regular diet, and to continue on  Order iron labs.     Sent MyC with above information.   Protonix for total of 8 weeks postoperatively.  She was instructed to return for a 6-month postoperative follow-up with a chest x-ray.    CXR was completed on 4/30/2024 and was reviewed personally by myself in PACS demonstrating the stomach to be maintained in normal anatomical position below the diaphragm.    On discussion today, patient reports she can eat all the foods that she wants to.  She does have eat smaller more frequent meals as she tends to get horvath sooner than she did preoperatively.  Notes that she had multiple passing out/fainting episodes prior to surgery attributing this to a vasovagal response which she has not experienced at all postoperatively.  Denies abdominal pain, nausea, vomiting, regurgitation, heartburn, dysphagia.  Reports that she has no excessive belching and the constipation she experienced in the immediate postoperative period resolved when she returned to a more regular diet.  She is no longer using PPI.    Patient's GERD Health Related Quality of Life (GERD-HRQL) Questionnaire score is 1.  I could not find GERD score sheet preoperatively in media review.    The following portions of the patient's history were reviewed and updated as appropriate: allergies, current medications, past family history, past medical history, past social history, past surgical history and problem list.    Review of Systems   HENT:  Negative for trouble swallowing.    Gastrointestinal:  Negative for abdominal pain, constipation, nausea and vomiting.        Denies heartburn, regurgitation         Objective:   Physical Exam  Constitutional:       General: She is not in acute distress.  HENT:      Head: Normocephalic and atraumatic.      Nose: Nose normal.   Eyes:      Extraocular Movements: Extraocular movements intact.   Cardiovascular:      Rate and Rhythm: Normal rate and regular rhythm.   Pulmonary:      Effort: Pulmonary effort is normal.      Breath sounds: Normal breath sounds.   Abdominal:       "Palpations: Abdomen is soft.      Tenderness: There is no abdominal tenderness.      Comments: Laparoscopic port site incisions are well-healed.   Musculoskeletal:      Right lower leg: Edema (Mild, nonpitting, wearing compression stockings) present.      Left lower leg: Edema (Mild, nonpitting, wearing compression stockings) present.   Skin:     General: Skin is warm and dry.     /80 (BP Location: Left arm, Patient Position: Sitting, Cuff Size: Standard)   Pulse 95   Temp 98.1 °F (36.7 °C) (Temporal)   Resp 14   Ht 5' 3\" (1.6 m)   Wt 62.2 kg (137 lb 2 oz)   SpO2 97%   BMI 24.29 kg/m²     XR chest pa & lateral    Result Date: 5/6/2024  Impression No acute cardiopulmonary disease. The patient's known hiatal hernia based on the prior CT is not visible at this time. Workstation performed: [id number]     XR chest pa & lateral    Result Date: 10/23/2023  Impression Lungs clear. Trace right effusion. Workstation performed: [id number]     XR CHEST PA & LATERAL    Result Date: 6/10/2023  Impression IMPRESSION: No pneumonia or CHF detected. Workstation:[id number]     No CT Chest results available for this patient.  No CT Chest,Abdomen,Pelvis results available for this patient.   No NM PET CT results available for this patient.   FL esophagram complete    Result Date: 10/6/2023  Narrative BARIUM SWALLOW-ESOPHAGRAM INDICATION:   s/p PEHR and Nissen. COMPARISON: CT chest 8/29/2023. IMAGES: 175 FLUOROSCOPY TIME:   1 minute 18 seconds TECHNIQUE: Limited esophagram was performed. The patient was given water-soluble contrast by mouth and images of the esophagus were obtained. Once no leak was identified with water-soluble contrast, the patient was given barium by mouth for confirmation. FINDINGS: Postsurgical changes of paraesophageal hernia repair and Nissen fundoplication. No leak is identified. Abnormal esophageal motility with tertiary contractions. Cine evaluation of the cervical esophagus was performed.  No " aspiration or laryngeal vestibule penetration.  No nasopharyngeal reflux. Gastroesophageal reflux was not observed. There is no recurrent hiatal hernia.     Impression No leak identified status post paraesophageal hernia repair and Nissen fundoplication. Abnormal esophageal motility with tertiary contractions, likely representing presbyesophagus. Workstation performed: [id number]

## 2024-05-07 NOTE — ASSESSMENT & PLAN NOTE
Ms. Engel is approximately 7 months out from laparoscopic paraesophageal hernia repair with mesh placement, Daniel gastroplasty, and Nissen fundoplication.  She has recovered well from a thoracic surgery standpoint.  She is able to tolerate all foods that she wants without difficulty.  Reinforced the recommendation of smaller more frequent meals, smaller bites, and chewing food well especially in times if she were to experience any return of preoperative symptoms.  Patient's recent chest x-ray was reviewed which demonstrates the stomach to be below the diaphragm.  Discussed with patient that she does not have any lifting restriction.  No formal follow-up will be scheduled at this time.  Patient instructed to call the office with any further questions or concerns or should she develop dysphagia or return of preoperative symptoms.  Patient in agreement with plan.

## 2024-05-30 ENCOUNTER — APPOINTMENT (OUTPATIENT)
Dept: LAB | Facility: MEDICAL CENTER | Age: 79
End: 2024-05-30
Payer: MEDICARE

## 2024-05-30 DIAGNOSIS — D72.819 LEUKOPENIA, UNSPECIFIED TYPE: ICD-10-CM

## 2024-05-30 DIAGNOSIS — E55.9 VITAMIN D DEFICIENCY: ICD-10-CM

## 2024-05-30 DIAGNOSIS — N39.0 URINARY TRACT INFECTION WITH HEMATURIA, SITE UNSPECIFIED: ICD-10-CM

## 2024-05-30 DIAGNOSIS — R31.9 URINARY TRACT INFECTION WITH HEMATURIA, SITE UNSPECIFIED: ICD-10-CM

## 2024-05-30 LAB
25(OH)D3 SERPL-MCNC: 81.8 NG/ML (ref 30–100)
BASOPHILS # BLD AUTO: 0.03 THOUSANDS/ÂΜL (ref 0–0.1)
BASOPHILS NFR BLD AUTO: 1 % (ref 0–1)
BILIRUB UR QL STRIP: NEGATIVE
CLARITY UR: CLEAR
COLOR UR: COLORLESS
EOSINOPHIL # BLD AUTO: 0.18 THOUSAND/ÂΜL (ref 0–0.61)
EOSINOPHIL NFR BLD AUTO: 5 % (ref 0–6)
ERYTHROCYTE [DISTWIDTH] IN BLOOD BY AUTOMATED COUNT: 13.7 % (ref 11.6–15.1)
GLUCOSE UR STRIP-MCNC: NEGATIVE MG/DL
HCT VFR BLD AUTO: 42.5 % (ref 34.8–46.1)
HGB BLD-MCNC: 13.2 G/DL (ref 11.5–15.4)
HGB UR QL STRIP.AUTO: NEGATIVE
IMM GRANULOCYTES # BLD AUTO: 0.01 THOUSAND/UL (ref 0–0.2)
IMM GRANULOCYTES NFR BLD AUTO: 0 % (ref 0–2)
KETONES UR STRIP-MCNC: NEGATIVE MG/DL
LEUKOCYTE ESTERASE UR QL STRIP: NEGATIVE
LYMPHOCYTES # BLD AUTO: 1.19 THOUSANDS/ÂΜL (ref 0.6–4.47)
LYMPHOCYTES NFR BLD AUTO: 31 % (ref 14–44)
MCH RBC QN AUTO: 31.7 PG (ref 26.8–34.3)
MCHC RBC AUTO-ENTMCNC: 31.1 G/DL (ref 31.4–37.4)
MCV RBC AUTO: 102 FL (ref 82–98)
MONOCYTES # BLD AUTO: 0.54 THOUSAND/ÂΜL (ref 0.17–1.22)
MONOCYTES NFR BLD AUTO: 14 % (ref 4–12)
NEUTROPHILS # BLD AUTO: 1.87 THOUSANDS/ÂΜL (ref 1.85–7.62)
NEUTS SEG NFR BLD AUTO: 49 % (ref 43–75)
NITRITE UR QL STRIP: NEGATIVE
NRBC BLD AUTO-RTO: 0 /100 WBCS
PH UR STRIP.AUTO: 6.5 [PH]
PLATELET # BLD AUTO: 237 THOUSANDS/UL (ref 149–390)
PMV BLD AUTO: 10.5 FL (ref 8.9–12.7)
PROT UR STRIP-MCNC: NEGATIVE MG/DL
RBC # BLD AUTO: 4.16 MILLION/UL (ref 3.81–5.12)
SP GR UR STRIP.AUTO: 1.01 (ref 1–1.03)
UROBILINOGEN UR STRIP-ACNC: <2 MG/DL
WBC # BLD AUTO: 3.82 THOUSAND/UL (ref 4.31–10.16)

## 2024-05-30 PROCEDURE — 36415 COLL VENOUS BLD VENIPUNCTURE: CPT

## 2024-05-30 PROCEDURE — 82306 VITAMIN D 25 HYDROXY: CPT

## 2024-05-30 PROCEDURE — 83918 ORGANIC ACIDS TOTAL QUANT: CPT

## 2024-05-30 PROCEDURE — 85025 COMPLETE CBC W/AUTO DIFF WBC: CPT

## 2024-05-30 PROCEDURE — 81003 URINALYSIS AUTO W/O SCOPE: CPT

## 2024-06-03 LAB — METHYLMALONATE SERPL-SCNC: 136 NMOL/L (ref 0–378)

## 2024-06-26 ENCOUNTER — TELEMEDICINE (OUTPATIENT)
Age: 79
End: 2024-06-26
Payer: MEDICARE

## 2024-06-26 VITALS
BODY MASS INDEX: 24.36 KG/M2 | WEIGHT: 137.5 LBS | DIASTOLIC BLOOD PRESSURE: 68 MMHG | HEART RATE: 69 BPM | TEMPERATURE: 98 F | HEIGHT: 63 IN | SYSTOLIC BLOOD PRESSURE: 130 MMHG

## 2024-06-26 DIAGNOSIS — I10 HYPERTENSION, ESSENTIAL: Primary | ICD-10-CM

## 2024-06-26 DIAGNOSIS — E78.00 HYPERCHOLESTEROLEMIA: ICD-10-CM

## 2024-06-26 DIAGNOSIS — E01.8 IODINE HYPOTHYROIDISM: ICD-10-CM

## 2024-06-26 PROCEDURE — 99213 OFFICE O/P EST LOW 20 MIN: CPT | Performed by: INTERNAL MEDICINE

## 2024-06-26 PROCEDURE — G2211 COMPLEX E/M VISIT ADD ON: HCPCS | Performed by: INTERNAL MEDICINE

## 2024-06-26 RX ORDER — OLMESARTAN MEDOXOMIL 5 MG/1
5 TABLET ORAL DAILY
Qty: 90 TABLET | Refills: 1 | Status: SHIPPED | OUTPATIENT
Start: 2024-06-26

## 2024-06-26 RX ORDER — LEVOTHYROXINE SODIUM 0.07 MG/1
75 TABLET ORAL DAILY
Qty: 90 TABLET | Refills: 1 | Status: SHIPPED | OUTPATIENT
Start: 2024-06-26

## 2024-06-26 NOTE — ASSESSMENT & PLAN NOTE
Mild elevation of LDL-C however HDL is also higher than 100.  Patient underwent CT angiography did not show any evidence of calcification.  She would prefer at this time not to start a statin medication.

## 2024-06-26 NOTE — PROGRESS NOTES
Virtual Regular Visit    Verification of patient location:    Patient is located at Home in the following state in which I hold an active license [place]      Assessment/Plan:    Problem List Items Addressed This Visit        Cardiovascular and Mediastinum    Hypertension, essential - Primary     Recommend restart antihypertensive for mild elevation of blood pressure.    Reviewed results of echocardiography with patient and daughter who was present at the time of the visit         Relevant Medications    olmesartan (BENICAR) 5 mg tablet       Other    Hypercholesterolemia     Mild elevation of LDL-C however HDL is also higher than 100.  Patient underwent CT angiography did not show any evidence of calcification.  She would prefer at this time not to start a statin medication.        Other Visit Diagnoses     Iodine hypothyroidism        Relevant Medications    levothyroxine (Synthroid) 75 mcg tablet               Reason for visit is   Chief Complaint   Patient presents with   • Virtual Regular Visit     Review labs done May 30th   • Health Screening     Medicare Wellness Visit scheduled for 9/3 fall risk dexa scan patient would not like to have done.urinary incontinence screening         Encounter provider Destiny Carter MD      Recent Visits  No visits were found meeting these conditions.  Showing recent visits within past 7 days and meeting all other requirements  Today's Visits  Date Type Provider Dept   06/26/24 Telemedicine Destiny Carter MD [facility] [place]   Showing today's visits and meeting all other requirements  Future Appointments  No visits were found meeting these conditions.  Showing future appointments within next 150 days and meeting all other requirements       The patient was identified by name and date of birth. Elin DIAZ Maren was informed that this is a telemedicine visit and that the visit is being conducted through the [employer] logo platform. She agrees to proceed..  My office  door was closed. No one else was in the room.  She acknowledged consent and understanding of privacy and security of the video platform. The patient has agreed to participate and understands they can discontinue the visit at any time.    Patient is aware this is a billable service.     Subjective    Chief Complaint   Patient presents with   • Virtual Regular Visit     Review labs done May 30th   • Health Screening     Medicare Wellness Visit scheduled for 9/3 fall risk dexa scan patient would not like to have done.urinary incontinence screening     0  Elin Engel is a 79 y.o. female     HPI     Seen today for a follow up visit for Hypothyroidism, HTN and HLD .  Recently had surgery for hiatal hernia and symptoms of lightheadedness, nausea and hypoglycemia have resolved  Presently has not started her anti HTN.  Will have her home blood pressure readings show a trend towards an elevation of blood pressure midmorning between 135-140 systolic.  Diastolics are usually under 80.  She is up and about and spends most of her mornings doing her gardening.  She feels energetic and denies any symptoms.          Past Medical History:   Diagnosis Date   • LYLE (acute kidney injury) (HCC) 03/22/2021   • Dehydration after exertion 03/25/2021   • Disease of thyroid gland    • Hypertension    • Osteopenia     last assessed 12/17/13   • PONV (postoperative nausea and vomiting)    • Syncope and collapse 03/22/2021   • Vaginal atrophy    • Vasovagal near-syncope 03/22/2021       Past Surgical History:   Procedure Laterality Date   • COLONOSCOPY     • DILATION AND CURETTAGE OF UTERUS     • GASTROPLASTY N/A 10/5/2023    Procedure: GASTROPLASTY,ALY;  Surgeon: Puma Hobson MD;  [facility];  Service: Thoracic   • MO ESOPHAGOGASTRODUODENOSCOPY TRANSORAL DIAGNOSTIC N/A 10/5/2023    Procedure: ESOPHAGOGASTRODUODENOSCOPY (EGD);  Surgeon: Puma Hobson MD;  [facility];  Service: Thoracic   • MO LAPS  "RPR PARAESPHGL HRNA INCL FUNDPLSTY W/O MESH N/A 10/5/2023    Procedure: laparoscopic paraesophageal hernia repair, with mesh, fundoplication;  Surgeon: Puma Hobson MD;  [facility];  Service: Thoracic   • TUBAL LIGATION         Current Outpatient Medications   Medication Sig Dispense Refill   • Cholecalciferol (VITAMIN D3) 2000 units CHEW Chew 2 each daily      • COD LIVER OIL PO Take by mouth 1 tsp daily     • cyanocobalamin (VITAMIN B-12) 1000 MCG tablet Take 1,000 mcg by mouth daily     • ketoconazole (NIZORAL) 2 % cream APPLY TWO TIMES DAILY TO EYEBROWS AND NASAL BRIDGE     • levothyroxine (Synthroid) 75 mcg tablet Take 1 tablet (75 mcg total) by mouth daily 90 tablet 1   • mometasone (ELOCON) 0.1 % cream Apply topically daily as needed (allergies) 45 g 1   • olmesartan (BENICAR) 5 mg tablet Take 1 tablet (5 mg total) by mouth daily 90 tablet 1     Current Facility-Administered Medications   Medication Dose Route Frequency Provider Last Rate Last Admin   • cyanocobalamin injection 1,000 mcg  1,000 mcg Intramuscular Q30 Days Destiny Carter MD   1,000 mcg at 02/20/24 1127        Allergies   Allergen Reactions   • Other Allergic Rhinitis     Seasonal allergies        Review of Systems   Skin:         History of rosacea   Allergic/Immunologic: Positive for environmental allergies.   All other systems reviewed and are negative.      Video Exam    Vitals:    06/26/24 1029   BP: 130/68   BP Location: Left arm   Patient Position: Sitting   Pulse: 69   Temp: 98 °F (36.7 °C)   Weight: 62.4 kg (137 lb 8 oz)   Height: 5' 3\" (1.6 m)       Physical Exam       Physical Exam   Constitutional: She is oriented to person, place, and time. She appears well-developed and well-nourished. No distress.   HENT: Face appears mildly flushed  Head: Normocephalic and atraumatic.   Neck: Normal range of motion. Neck supple.   Pulmonary/Chest: Effort normal. No respiratory distress.   Musculoskeletal: Normal range of " motion.   Neurological: She is alert and oriented to person, place, and time.   Skin: She is not diaphoretic.   Psychiatric: She has a normal mood and affect. Her behavior is normal. Judgment and thought content normal.      I spent 15 to 20 minutes directly with the patient during this visit  today in which greater than 50% of this time was spent in counseling/coordination of care regarding Diagnostic results, Prognosis, Risks and benefits of tx options, Intructions for management, Patient and family education, Importance of tx compliance, Risk factor reductions and Impressions.      Visit Time  Total Visit Duration: 20 mins

## 2024-06-26 NOTE — ASSESSMENT & PLAN NOTE
Recommend restart antihypertensive for mild elevation of blood pressure.    Reviewed results of echocardiography with patient and daughter who was present at the time of the visit

## 2024-08-26 ENCOUNTER — TELEPHONE (OUTPATIENT)
Age: 79
End: 2024-08-26

## 2024-08-26 NOTE — TELEPHONE ENCOUNTER
Patient's daughter returned call from office stating they do not wish to move her 9/3 appointment to earlier date and would like to keep original date.

## 2024-09-03 ENCOUNTER — OFFICE VISIT (OUTPATIENT)
Age: 79
End: 2024-09-03
Payer: MEDICARE

## 2024-09-03 VITALS
TEMPERATURE: 97.5 F | HEART RATE: 88 BPM | OXYGEN SATURATION: 99 % | HEIGHT: 63 IN | SYSTOLIC BLOOD PRESSURE: 164 MMHG | WEIGHT: 139.4 LBS | DIASTOLIC BLOOD PRESSURE: 84 MMHG | BODY MASS INDEX: 24.7 KG/M2

## 2024-09-03 DIAGNOSIS — R73.09 ELEVATED HEMOGLOBIN A1C: ICD-10-CM

## 2024-09-03 DIAGNOSIS — E53.8 VITAMIN B12 DEFICIENCY: ICD-10-CM

## 2024-09-03 DIAGNOSIS — I10 HYPERTENSION, ESSENTIAL: ICD-10-CM

## 2024-09-03 DIAGNOSIS — K44.9 PARAESOPHAGEAL HERNIA: ICD-10-CM

## 2024-09-03 DIAGNOSIS — E03.4 HYPOTHYROIDISM DUE TO ACQUIRED ATROPHY OF THYROID: ICD-10-CM

## 2024-09-03 DIAGNOSIS — D72.819 LEUKOPENIA, UNSPECIFIED TYPE: ICD-10-CM

## 2024-09-03 DIAGNOSIS — Z00.00 MEDICARE ANNUAL WELLNESS VISIT, SUBSEQUENT: ICD-10-CM

## 2024-09-03 DIAGNOSIS — E78.00 HYPERCHOLESTEROLEMIA: Primary | ICD-10-CM

## 2024-09-03 DIAGNOSIS — K21.00 GASTROESOPHAGEAL REFLUX DISEASE WITH ESOPHAGITIS WITHOUT HEMORRHAGE: ICD-10-CM

## 2024-09-03 PROBLEM — D50.0 IRON DEFICIENCY ANEMIA DUE TO CHRONIC BLOOD LOSS: Status: RESOLVED | Noted: 2022-01-12 | Resolved: 2024-09-03

## 2024-09-03 PROBLEM — R13.10 DYSPHAGIA: Status: RESOLVED | Noted: 2021-12-02 | Resolved: 2024-09-03

## 2024-09-03 PROCEDURE — G0439 PPPS, SUBSEQ VISIT: HCPCS | Performed by: INTERNAL MEDICINE

## 2024-09-03 PROCEDURE — 99214 OFFICE O/P EST MOD 30 MIN: CPT | Performed by: INTERNAL MEDICINE

## 2024-09-03 RX ORDER — OLMESARTAN MEDOXOMIL 5 MG/1
2.5 TABLET ORAL DAILY
Start: 2024-09-03

## 2024-09-03 NOTE — PROGRESS NOTES
Ambulatory Visit  Name: Elin Engel      : 1945      MRN: [id number]  Encounter Provider: Destiny Carter MD  Encounter Date: 9/3/2024   Encounter department: [facility] [place]    Assessment & Plan   1. Hypercholesterolemia  Assessment & Plan:  LDL c is elevated at 125 however the HDL is nearly as high at 117 so she prefers to not start a statin medication at this time.  Orders:  -     Lipid Panel with Direct LDL reflex; Future  2. Hypertension, essential  Assessment & Plan:  Reduced dose of olmesartan to 2.5 mg a day.  She was instructed to continue to watch the blood pressure at home and to start it if the diastolic goes greater than 80.  We did discuss about the renal benefits of taking this medication given her history of hypertension.  She is agreeable to giving it a try at this time.  Orders:  -     olmesartan (BENICAR) 5 mg tablet; Take 0.5 tablets (2.5 mg total) by mouth daily  -     Comprehensive metabolic panel; Future  -     UA w Reflex to Microscopic w Reflex to Culture; Future  3. Vitamin B12 deficiency  Assessment & Plan:  Continue supplementation.  Folic acid 1 mg daily, mild leukopenia  4. Paraesophageal hernia  Assessment & Plan:  S/P surgical repair , doing well  5. Medicare annual wellness visit, subsequent  6. Elevated hemoglobin A1c  -     Hemoglobin A1C; Future  7. Leukopenia, unspecified type  -     CBC and differential; Future  8. Gastroesophageal reflux disease with esophagitis without hemorrhage  Assessment & Plan:  Status post surgical correction of hiatal hernia with resultant improvement of symptoms  9. Hypothyroidism due to acquired atrophy of thyroid  Assessment & Plan:  Continue present supplementation.       Preventive health issues were discussed with patient, and age appropriate screening tests were ordered as noted in patient's After Visit Summary. Personalized health advice and appropriate referrals for health education or preventive  services given if needed, as noted in patient's After Visit Summary.      Chief Complaint   Patient presents with   • Medicare Wellness Visit     Medicare Wellness Visit sub    • Follow-up     Dexa scan ordered 10/10 patient would not like to have done      History of Present Illness         HPI     Today for annual wellness visit along with follow-up for hypertension and hyperlipidemia.    Patient has very mild hypertension and is currently not taking her medications because overall she feels well and had a bad reaction to previously taking the Benicar and her pressure dropped.  She is reluctant to starting the medication since her blood pressure is fairly well-controlled for most part.  Most of her readings show her diastolic blood pressure well below 80 mmHg and systolic blood pressure usually less than 125.  Very active through the day and has no symptoms of tiredness or fatigue or any other symptoms.  Has done well since her hiatal hernia surgery but does respect the need to eating small portions   portions.      Patient Care Team:  Destiny Carter MD as PCP - General    Review of Systems   Gastrointestinal:         Has modified her diet to eating smaller portions more often.   Musculoskeletal:         Mild thoracic kyphosis   Skin:         rosacea   All other systems reviewed and are negative.    Medical History Reviewed by provider this encounter:  Tobacco  Allergies  Meds  Problems  Med Hx  Surg Hx  Fam Hx       Annual Wellness Visit Questionnaire   Elin is here for her Subsequent Wellness visit. Last Medicare Wellness visit information reviewed, patient interviewed and updates made to the record today.      Health Risk Assessment:   Patient rates overall health as very good. Patient feels that their physical health rating is much better. Patient is very satisfied with their life. Eyesight was rated as same. Hearing was rated as same. Patient feels that their emotional and mental health rating is  same. Patients states they are never, rarely angry. Patient states they are never, rarely unusually tired/fatigued. Pain experienced in the last 7 days has been none. Patient states that she has experienced no weight loss or gain in last 6 months.     Depression Screening:   PHQ-2 Score: 0      Fall Risk Screening:   In the past year, patient has experienced: no history of falling in past year      Urinary Incontinence Screening:   Patient has not leaked urine accidently in the last six months.     Home Safety:  Patient does not have trouble with stairs inside or outside of their home. Patient has working smoke alarms and has working carbon monoxide detector. Home safety hazards include: none.     Nutrition:   Current diet is No Added Salt, Limited junk food and Other (please comment). Lots of fruits, vegetables, nuts, seeds and fish    Medications:   Patient is currently taking over-the-counter supplements. OTC medications include: see medication list. Patient is able to manage medications.     Activities of Daily Living (ADLs)/Instrumental Activities of Daily Living (IADLs):   Walk and transfer into and out of bed and chair?: Yes  Dress and groom yourself?: Yes    Bathe or shower yourself?: Yes    Feed yourself? Yes  Do your laundry/housekeeping?: Yes  Manage your money, pay your bills and track your expenses?: Yes  Make your own meals?: Yes    Do your own shopping?: Yes    Durable Medical Equipment Suppliers  [employer]compression stockings    Previous Hospitalizations:   Any hospitalizations or ED visits within the last 12 months?: Yes    How many hospitalizations have you had in the last year?: 1-2    Hospitalization Comments: One time, overnight stay for hiatal hernia surgery, October 2023    Advance Care Planning:   Living will: Yes    Durable POA for healthcare: Yes    Advanced directive: Yes      PREVENTIVE SCREENINGS      Cardiovascular Screening:    General: Screening Not Indicated  and History Lipid Disorder      Diabetes Screening:     General: Screening Current      Colorectal Cancer Screening:     General: Screening Current      Breast Cancer Screening:     General: Screening Current      Cervical Cancer Screening:    General: Screening Not Indicated      Osteoporosis Screening:    General: Screening Not Indicated and History Osteoporosis      Lung Cancer Screening:     General: Screening Not Indicated      Hepatitis C Screening:    General: Screening Current    Screening, Brief Intervention, and Referral to Treatment (SBIRT)    Screening  Typical number of drinks in a day: 0  Typical number of drinks in a week: 0  Interpretation: Low risk drinking behavior.    AUDIT-C Screenin) How often did you have a drink containing alcohol in the past year? monthly or less  2) How many drinks did you have on a typical day when you were drinking in the past year? 1 to 2  3) How often did you have 6 or more drinks on one occasion in the past year? never    AUDIT-C Score: 1  Interpretation: Score 0-2 (female): Negative screen for alcohol misuse    Single Item Drug Screening:  How often have you used an illegal drug (including marijuana) or a prescription medication for non-medical reasons in the past year? never    Single Item Drug Screen Score: 0  Interpretation: Negative screen for possible drug use disorder    Other Counseling Topics:   Car/seat belt/driving safety, skin self-exam and calcium and vitamin D intake and regular weightbearing exercise.     Social Determinants of Health     Financial Resource Strain: Patient Declined (2023)    Overall Financial Resource Strain (CARDIA)    • Difficulty of Paying Living Expenses: Patient declined   Food Insecurity: No Food Insecurity (9/3/2024)    Hunger Vital Sign    • Worried About Running Out of Food in the Last Year: Never true    • Ran Out of Food in the Last Year: Never true   Transportation Needs: No Transportation Needs (9/3/2024)    [facility]IL[facility]  "- Transportation    • Lack of Transportation (Medical): No    • Lack of Transportation (Non-Medical): No   Housing Stability: Low Risk  (9/3/2024)    Housing Stability Vital Sign    • Unable to Pay for Housing in the Last Year: No    • Number of Times Moved in the Last Year: 0    • Homeless in the Last Year: No   Utilities: Not At Risk (9/3/2024)    [facility] Utilities    • Threatened with loss of utilities: No     No results found.    Objective     /84 (BP Location: Left arm, Patient Position: Sitting, Cuff Size: Standard)   Pulse 88   Temp 97.5 °F (36.4 °C) (Temporal)   Ht 5' 2.72\" (1.593 m)   Wt 63.2 kg (139 lb 6.4 oz)   SpO2 99%   BMI 24.92 kg/m²     Physical Exam    Gen: NAD,   Heent: atraumatic, normocephalic  Mouth: is moist  Neck: is supple, no JVD, no carotid bruits.   Heart: is regular Normal s1, s2,  Lungs: are clear to auscultation  Abd: soft, non tender, NABS  Ext: no edema, distal pulses normal  Skin: mild rosacea both cheeks  Mood: normal affect  Neuro: AAOx3   Administrative Statements   I have spent a total time of 45 minutes in caring for this patient on the day of the visit/encounter including Diagnostic results, Prognosis, Risks and benefits of tx options, Instructions for management, Patient and family education, Importance of tx compliance, Risk factor reductions, Impressions, Documenting in the medical record, Reviewing / ordering tests, medicine, procedures  , and Obtaining or reviewing history  .    "

## 2024-09-03 NOTE — ASSESSMENT & PLAN NOTE
Reduced dose of olmesartan to 2.5 mg a day.  She was instructed to continue to watch the blood pressure at home and to start it if the diastolic goes greater than 80.  We did discuss about the renal benefits of taking this medication given her history of hypertension.  She is agreeable to giving it a try at this time.

## 2024-09-03 NOTE — PATIENT INSTRUCTIONS
Medicare Preventive Visit Patient Instructions  Thank you for completing your Welcome to Medicare Visit or Medicare Annual Wellness Visit today. Your next wellness visit will be due in one year (9/4/2025).  The screening/preventive services that you may require over the next 5-10 years are detailed below. Some tests may not apply to you based off risk factors and/or age. Screening tests ordered at today's visit but not completed yet may show as past due. Also, please note that scanned in results may not display below.  Preventive Screenings:  Service Recommendations Previous Testing/Comments   Colorectal Cancer Screening  * Colonoscopy    * Fecal Occult Blood Test (FOBT)/Fecal Immunochemical Test (FIT)  * Fecal DNA/Cologuard Test  * Flexible Sigmoidoscopy Age: 45-75 years old   Colonoscopy: every 10 years (may be performed more frequently if at higher risk)  OR  FOBT/FIT: every 1 year  OR  Cologuard: every 3 years  OR  Sigmoidoscopy: every 5 years  Screening may be recommended earlier than age 45 if at higher risk for colorectal cancer. Also, an individualized decision between you and your healthcare provider will decide whether screening between the ages of 76-85 would be appropriate. Colonoscopy: 08/30/2021  FOBT/FIT: Not on file  Cologuard: Not on file  Sigmoidoscopy: Not on file    Screening Current     Breast Cancer Screening Age: 40+ years old  Frequency: every 1-2 years  Not required if history of left and right mastectomy Mammogram: 01/24/2024    Screening Current   Cervical Cancer Screening Between the ages of 21-29, pap smear recommended once every 3 years.   Between the ages of 30-65, can perform pap smear with HPV co-testing every 5 years.   Recommendations may differ for women with a history of total hysterectomy, cervical cancer, or abnormal pap smears in past. Pap Smear: 09/15/2022    Screening Not Indicated   Hepatitis C Screening Once for adults born between 1945 and 1965  More frequently in  patients at high risk for Hepatitis C Hep C Antibody: 08/19/2021    Screening Current   Diabetes Screening 1-2 times per year if you're at risk for diabetes or have pre-diabetes Fasting glucose: 80 mg/dL (2/16/2024)  A1C: 5.9 % (2/16/2024)  Screening Current   Cholesterol Screening Once every 5 years if you don't have a lipid disorder. May order more often based on risk factors. Lipid panel: 02/16/2024    Screening Not Indicated  History Lipid Disorder     Other Preventive Screenings Covered by Medicare:  Abdominal Aortic Aneurysm (AAA) Screening: covered once if your at risk. You're considered to be at risk if you have a family history of AAA.  Lung Cancer Screening: covers low dose CT scan once per year if you meet all of the following conditions: (1) Age 55-77; (2) No signs or symptoms of lung cancer; (3) Current smoker or have quit smoking within the last 15 years; (4) You have a tobacco smoking history of at least 20 pack years (packs per day multiplied by number of years you smoked); (5) You get a written order from a healthcare provider.  Glaucoma Screening: covered annually if you're considered high risk: (1) You have diabetes OR (2) Family history of glaucoma OR (3)  aged 50 and older OR (4)  American aged 65 and older  Osteoporosis Screening: covered every 2 years if you meet one of the following conditions: (1) You're estrogen deficient and at risk for osteoporosis based off medical history and other findings; (2) Have a vertebral abnormality; (3) On glucocorticoid therapy for more than 3 months; (4) Have primary hyperparathyroidism; (5) On osteoporosis medications and need to assess response to drug therapy.   Last bone density test (DXA Scan): 10/17/2012.  HIV Screening: covered annually if you're between the age of 15-65. Also covered annually if you are younger than 15 and older than 65 with risk factors for HIV infection. For pregnant patients, it is covered up to 3 times per  pregnancy.    Immunizations:  Immunization Recommendations   Influenza Vaccine Annual influenza vaccination during flu season is recommended for all persons aged >= 6 months who do not have contraindications   Pneumococcal Vaccine   * Pneumococcal conjugate vaccine = PCV13 (Prevnar 13), PCV15 (Vaxneuvance), PCV20 (Prevnar 20)  * Pneumococcal polysaccharide vaccine = PPSV23 (Pneumovax) Adults 19-63 yo with certain risk factors or if 65+ yo  If never received any pneumonia vaccine: recommend Prevnar 20 (PCV20)  Give PCV20 if previously received 1 dose of PCV13 or PPSV23   Hepatitis B Vaccine 3 dose series if at intermediate or high risk (ex: diabetes, end stage renal disease, liver disease)   Respiratory syncytial virus (RSV) Vaccine - COVERED BY MEDICARE PART D  * RSVPreF3 (Arexvy) CDC recommends that adults 60 years of age and older may receive a single dose of RSV vaccine using shared clinical decision-making (SCDM)   Tetanus (Td) Vaccine - COST NOT COVERED BY MEDICARE PART B Following completion of primary series, a booster dose should be given every 10 years to maintain immunity against tetanus. Td may also be given as tetanus wound prophylaxis.   Tdap Vaccine - COST NOT COVERED BY MEDICARE PART B Recommended at least once for all adults. For pregnant patients, recommended with each pregnancy.   Shingles Vaccine (Shingrix) - COST NOT COVERED BY MEDICARE PART B  2 shot series recommended in those 19 years and older who have or will have weakened immune systems or those 50 years and older     Health Maintenance Due:      Topic Date Due   • DXA SCAN  10/17/2014   • Breast Cancer Screening: Mammogram  01/24/2025   • Colorectal Cancer Screening  08/30/2026   • Hepatitis C Screening  Completed     Immunizations Due:      Topic Date Due   • Pneumococcal Vaccine: 65+ Years (1 of 1 - PCV) Never done   • COVID-19 Vaccine (4 - 2023-24 season) 09/01/2024   • Influenza Vaccine (1) 09/01/2024     Advance Directives   What are  advance directives?  Advance directives are legal documents that state your wishes and plans for medical care. These plans are made ahead of time in case you lose your ability to make decisions for yourself. Advance directives can apply to any medical decision, such as the treatments you want, and if you want to donate organs.   What are the types of advance directives?  There are many types of advance directives, and each state has rules about how to use them. You may choose a combination of any of the following:  Living will:  This is a written record of the treatment you want. You can also choose which treatments you do not want, which to limit, and which to stop at a certain time. This includes surgery, medicine, IV fluid, and tube feedings.   Durable power of  for healthcare (DPAHC):  This is a written record that states who you want to make healthcare choices for you when you are unable to make them for yourself. This person, called a proxy, is usually a family member or a friend. You may choose more than 1 proxy.  Do not resuscitate (DNR) order:  A DNR order is used in case your heart stops beating or you stop breathing. It is a request not to have certain forms of treatment, such as CPR. A DNR order may be included in other types of advance directives.  Medical directive:  This covers the care that you want if you are in a coma, near death, or unable to make decisions for yourself. You can list the treatments you want for each condition. Treatment may include pain medicine, surgery, blood transfusions, dialysis, IV or tube feedings, and a ventilator (breathing machine).  Values history:  This document has questions about your views, beliefs, and how you feel and think about life. This information can help others choose the care that you would choose.  Why are advance directives important?  An advance directive helps you control your care. Although spoken wishes may be used, it is better to have your  wishes written down. Spoken wishes can be misunderstood, or not followed. Treatments may be given even if you do not want them. An advance directive may make it easier for your family to make difficult choices about your care.       © Copyright [employer] 2018 Information is for End User's use only and may not be sold, redistributed or otherwise used for commercial purposes. All illustrations and images included in CareNotes® are the copyrighted property of [employer].A[employer]., Inc[employer] or [employer]

## 2024-09-03 NOTE — ASSESSMENT & PLAN NOTE
LDL c is elevated at 125 however the HDL is nearly as high at 117 so she prefers to not start a statin medication at this time.

## 2025-01-20 DIAGNOSIS — E01.8 IODINE HYPOTHYROIDISM: ICD-10-CM

## 2025-01-20 RX ORDER — LEVOTHYROXINE SODIUM 75 UG/1
75 TABLET ORAL DAILY
Qty: 90 TABLET | Refills: 1 | Status: SHIPPED | OUTPATIENT
Start: 2025-01-20

## 2025-01-20 NOTE — TELEPHONE ENCOUNTER
Reason for call:   [x] Refill   [] Prior Auth  [] Other:     Office:   [x] PCP/Provider -   [] Specialty/Provider -     Medication: levothyroxine (Synthroid) 75 mcg tablet    Dose/Frequency: Take 1 tablet (75 mcg total) by mouth daily     Quantity: 90 tablet     Pharmacy: [facility] [place] [facility]     Does the patient have enough for 3 days?   [] Yes   [x] No - Send as HP to POD

## 2025-01-29 ENCOUNTER — RESULTS FOLLOW-UP (OUTPATIENT)
Dept: INTERNAL MEDICINE CLINIC | Facility: OTHER | Age: 80
End: 2025-01-29

## 2025-04-02 ENCOUNTER — TELEPHONE (OUTPATIENT)
Age: 80
End: 2025-04-02

## 2025-04-11 ENCOUNTER — RESULTS FOLLOW-UP (OUTPATIENT)
Dept: OTHER | Facility: HOSPITAL | Age: 80
End: 2025-04-11

## 2025-04-11 ENCOUNTER — APPOINTMENT (OUTPATIENT)
Dept: LAB | Facility: MEDICAL CENTER | Age: 80
End: 2025-04-11
Attending: INTERNAL MEDICINE
Payer: MEDICARE

## 2025-04-11 DIAGNOSIS — R73.09 ELEVATED HEMOGLOBIN A1C: ICD-10-CM

## 2025-04-11 DIAGNOSIS — D72.819 LEUKOPENIA, UNSPECIFIED TYPE: ICD-10-CM

## 2025-04-11 DIAGNOSIS — E78.00 HYPERCHOLESTEROLEMIA: ICD-10-CM

## 2025-04-11 DIAGNOSIS — I10 HYPERTENSION, ESSENTIAL: ICD-10-CM

## 2025-04-11 LAB
ALBUMIN SERPL BCG-MCNC: 4.4 G/DL (ref 3.5–5)
ALP SERPL-CCNC: 72 U/L (ref 34–104)
ALT SERPL W P-5'-P-CCNC: 23 U/L (ref 7–52)
ANION GAP SERPL CALCULATED.3IONS-SCNC: 10 MMOL/L (ref 4–13)
AST SERPL W P-5'-P-CCNC: 28 U/L (ref 13–39)
BACTERIA UR QL AUTO: NORMAL /HPF
BASOPHILS # BLD AUTO: 0.03 THOUSANDS/ÂΜL (ref 0–0.1)
BASOPHILS NFR BLD AUTO: 1 % (ref 0–1)
BILIRUB SERPL-MCNC: 0.87 MG/DL (ref 0.2–1)
BILIRUB UR QL STRIP: NEGATIVE
BUN SERPL-MCNC: 15 MG/DL (ref 5–25)
CALCIUM SERPL-MCNC: 9.5 MG/DL (ref 8.4–10.2)
CHLORIDE SERPL-SCNC: 100 MMOL/L (ref 96–108)
CHOLEST SERPL-MCNC: 297 MG/DL (ref ?–200)
CLARITY UR: CLEAR
CO2 SERPL-SCNC: 28 MMOL/L (ref 21–32)
COLOR UR: COLORLESS
CREAT SERPL-MCNC: 0.63 MG/DL (ref 0.6–1.3)
EOSINOPHIL # BLD AUTO: 0.22 THOUSAND/ÂΜL (ref 0–0.61)
EOSINOPHIL NFR BLD AUTO: 6 % (ref 0–6)
ERYTHROCYTE [DISTWIDTH] IN BLOOD BY AUTOMATED COUNT: 13.5 % (ref 11.6–15.1)
EST. AVERAGE GLUCOSE BLD GHB EST-MCNC: 114 MG/DL
GFR SERPL CREATININE-BSD FRML MDRD: 84 ML/MIN/1.73SQ M
GLUCOSE P FAST SERPL-MCNC: 84 MG/DL (ref 65–99)
GLUCOSE UR STRIP-MCNC: NEGATIVE MG/DL
HBA1C MFR BLD: 5.6 %
HCT VFR BLD AUTO: 43.8 % (ref 34.8–46.1)
HDLC SERPL-MCNC: 113 MG/DL
HGB BLD-MCNC: 14.2 G/DL (ref 11.5–15.4)
HGB UR QL STRIP.AUTO: NEGATIVE
IMM GRANULOCYTES # BLD AUTO: 0 THOUSAND/UL (ref 0–0.2)
IMM GRANULOCYTES NFR BLD AUTO: 0 % (ref 0–2)
KETONES UR STRIP-MCNC: NEGATIVE MG/DL
LDLC SERPL CALC-MCNC: 172 MG/DL (ref 0–100)
LEUKOCYTE ESTERASE UR QL STRIP: NEGATIVE
LYMPHOCYTES # BLD AUTO: 1.21 THOUSANDS/ÂΜL (ref 0.6–4.47)
LYMPHOCYTES NFR BLD AUTO: 35 % (ref 14–44)
MCH RBC QN AUTO: 32.1 PG (ref 26.8–34.3)
MCHC RBC AUTO-ENTMCNC: 32.4 G/DL (ref 31.4–37.4)
MCV RBC AUTO: 99 FL (ref 82–98)
MONOCYTES # BLD AUTO: 0.5 THOUSAND/ÂΜL (ref 0.17–1.22)
MONOCYTES NFR BLD AUTO: 14 % (ref 4–12)
NEUTROPHILS # BLD AUTO: 1.51 THOUSANDS/ÂΜL (ref 1.85–7.62)
NEUTS SEG NFR BLD AUTO: 44 % (ref 43–75)
NITRITE UR QL STRIP: NEGATIVE
NON-SQ EPI CELLS URNS QL MICRO: NORMAL /HPF
NRBC BLD AUTO-RTO: 0 /100 WBCS
PH UR STRIP.AUTO: 7 [PH]
PLATELET # BLD AUTO: 236 THOUSANDS/UL (ref 149–390)
PMV BLD AUTO: 10.5 FL (ref 8.9–12.7)
POTASSIUM SERPL-SCNC: 4 MMOL/L (ref 3.5–5.3)
PROT SERPL-MCNC: 7 G/DL (ref 6.4–8.4)
PROT UR STRIP-MCNC: ABNORMAL MG/DL
RBC # BLD AUTO: 4.43 MILLION/UL (ref 3.81–5.12)
RBC #/AREA URNS AUTO: NORMAL /HPF
SODIUM SERPL-SCNC: 138 MMOL/L (ref 135–147)
SP GR UR STRIP.AUTO: 1.01 (ref 1–1.03)
TRANS CELLS #/AREA URNS HPF: PRESENT /[HPF]
TRIGL SERPL-MCNC: 62 MG/DL (ref ?–150)
UROBILINOGEN UR STRIP-ACNC: <2 MG/DL
WBC # BLD AUTO: 3.47 THOUSAND/UL (ref 4.31–10.16)
WBC #/AREA URNS AUTO: NORMAL /HPF

## 2025-04-11 PROCEDURE — 81001 URINALYSIS AUTO W/SCOPE: CPT

## 2025-04-11 PROCEDURE — 80053 COMPREHEN METABOLIC PANEL: CPT

## 2025-04-11 PROCEDURE — 80061 LIPID PANEL: CPT

## 2025-04-11 PROCEDURE — 36415 COLL VENOUS BLD VENIPUNCTURE: CPT

## 2025-04-11 PROCEDURE — 85025 COMPLETE CBC W/AUTO DIFF WBC: CPT

## 2025-04-11 PROCEDURE — 83036 HEMOGLOBIN GLYCOSYLATED A1C: CPT

## 2025-04-11 NOTE — RESULT ENCOUNTER NOTE
Cholesterol was a little higher than it was last time and so was the LDL-C.  The WBC was a little bit on the lower end as it usually is.  I don't see that I ordered a B12 level this time

## 2025-04-16 ENCOUNTER — OFFICE VISIT (OUTPATIENT)
Age: 80
End: 2025-04-16
Payer: MEDICARE

## 2025-04-16 VITALS
SYSTOLIC BLOOD PRESSURE: 180 MMHG | WEIGHT: 139.4 LBS | HEART RATE: 87 BPM | HEIGHT: 63 IN | OXYGEN SATURATION: 99 % | DIASTOLIC BLOOD PRESSURE: 86 MMHG | TEMPERATURE: 98.2 F | BODY MASS INDEX: 24.7 KG/M2

## 2025-04-16 DIAGNOSIS — E78.00 HYPERCHOLESTEROLEMIA: ICD-10-CM

## 2025-04-16 DIAGNOSIS — L71.9 ROSACEA: Primary | ICD-10-CM

## 2025-04-16 DIAGNOSIS — I10 HYPERTENSION, ESSENTIAL: ICD-10-CM

## 2025-04-16 DIAGNOSIS — R73.09 ELEVATED HEMOGLOBIN A1C MEASUREMENT: ICD-10-CM

## 2025-04-16 DIAGNOSIS — E03.9 ACQUIRED HYPOTHYROIDISM: ICD-10-CM

## 2025-04-16 DIAGNOSIS — Z84.89 FHX: ALLERGIES: ICD-10-CM

## 2025-04-16 DIAGNOSIS — E53.8 VITAMIN B12 DEFICIENCY: ICD-10-CM

## 2025-04-16 DIAGNOSIS — E55.9 VITAMIN D DEFICIENCY: ICD-10-CM

## 2025-04-16 PROCEDURE — 99214 OFFICE O/P EST MOD 30 MIN: CPT | Performed by: INTERNAL MEDICINE

## 2025-04-16 PROCEDURE — G2211 COMPLEX E/M VISIT ADD ON: HCPCS | Performed by: INTERNAL MEDICINE

## 2025-04-16 RX ORDER — CARVEDILOL 3.12 MG/1
3.12 TABLET ORAL 2 TIMES DAILY WITH MEALS
Qty: 60 TABLET | Refills: 2 | Status: SHIPPED | OUTPATIENT
Start: 2025-04-16

## 2025-04-16 RX ORDER — MINOCYCLINE HYDROCHLORIDE 50 MG/1
1 CAPSULE ORAL DAILY
COMMUNITY
Start: 2025-04-01

## 2025-04-16 RX ORDER — MOMETASONE FUROATE 1 MG/G
CREAM TOPICAL DAILY PRN
Qty: 45 G | Refills: 1 | Status: SHIPPED | OUTPATIENT
Start: 2025-04-16

## 2025-04-16 NOTE — PROGRESS NOTES
Name: Elin Engel      : 1945      MRN: [id number]  Encounter Provider: Destiny Carter MD  Encounter Date: 2025   [facility]    :  Assessment & Plan  FHx: allergies    Orders:    mometasone (ELOCON) 0.1 % cream; Apply topically daily as needed (allergies)    Rosacea    Orders:    mometasone (ELOCON) 0.1 % cream; Apply topically daily as needed (allergies)    Hypertension, essential    Orders:    carvedilol (COREG) 3.125 mg tablet; Take 1 tablet (3.125 mg total) by mouth 2 (two) times a day with meals    CBC and differential; Future    Comprehensive metabolic panel; Future    Acquired hypothyroidism    Orders:    TSH, 3rd generation with Free T4 reflex; Future    Hypercholesterolemia    Orders:    Lipid Panel with Direct LDL reflex; Future    Vitamin D deficiency         Vitamin B12 deficiency    Orders:    Methylmalonic acid, serum; Future    Elevated hemoglobin A1c measurement    Orders:    Hemoglobin A1C; Future        Assessment & Plan  1. Hypertension.  - Blood pressure readings have been consistently elevated, with today's reading at 180/86.  - Experienced itching with Benicar, leading to its discontinuation.  - Prescription for carvedilol 3.125 mg, to be taken twice daily, has been provided.  - Advised to monitor blood pressure at home and report any significant changes. If no side effects are observed after 2 weeks and there is no improvement in blood pressure, the dosage can be increased to 6.25 mg twice daily.    2. Hypercholesterolemia.  - Cholesterol levels have shown an increase since the last evaluation, with a current total cholesterol level of 207.  - LDL levels have escalated from 125 to 172.  - Advised to modify diet by reducing intake of nuts.  - Lipid panel will be conducted in 5 months to reassess cholesterol levels.    3. Rosacea.  - Currently using minocycline as needed for flare-ups.  - Discussed that managing blood  pressure might help reduce the redness associated with rosacea.  - Monitoring blood pressure and adjusting treatment accordingly.    4. Advanced Directives.  - Discussed the importance of monitoring and managing blood pressure to prevent complications such as stroke.  - Emphasized the need for regular follow-up and communication regarding medication effectiveness and side effects.  - Encouraged to keep track of blood pressure readings and dietary changes to ensure optimal management of conditions.     Chief Complaint   Patient presents with    Follow-up     Review labs done 4/11    Health Screening Medicare Wellness Visit scheduled for 9/10 patient would not like to have dexa scan done      History of Present Illness     History of Present Illness  The patient presents for evaluation of hypertension, hypercholesterolemia, and rosacea.    A pruritic reaction to Benicar was experienced, which resolved after discontinuing the medication for 3 days. Blood pressure readings at home have ranged from 125/79 to 128/60, with a peak of 136 systolic, attributed to physical activity prior to measurement. An active lifestyle is maintained, including walking between 6 to 7 miles daily. No leg swelling is reported, and compression stockings are worn during exercise for leg support. A history of consultation with a cardiologist preoperatively is noted.    Dietary habits include the consumption of 6 almonds each morning, followed by fruit, cottage cheese, yogurt with fruit, and a dinner consisting of either salmon, chicken, sardines, salad, or vegetables. Occasional indulgences include popcorn and Gouda cheese. Additionally, 2 dates are consumed daily, and pistachios are incorporated into salads. The intake of pistachios has recently increased, while walnuts are consumed sparingly. Chips and French fries are avoided. A history of elevated cholesterol levels, managed with medication until leg pain developed, is noted. No carotid  artery evaluation has been performed. A calcium score of 0 is reported.    Rosacea is managed with minocycline as needed for flare-ups.    A hiatal hernia is present, and large meals are avoided.  Review of Systems   Skin:         Rosacea   All other systems reviewed and are negative.    Past Medical History:   Diagnosis Date    LYLE (acute kidney injury) (HCC) 2021    Dehydration after exertion 2021    Disease of thyroid gland     Hypertension     Osteopenia     last assessed 13    PONV (postoperative nausea and vomiting)     Syncope and collapse 2021    Vaginal atrophy     Vasovagal near-syncope 2021     Past Surgical History:   Procedure Laterality Date    COLONOSCOPY      DILATION AND CURETTAGE OF UTERUS      GASTROPLASTY N/A 10/5/2023    Procedure: GASTROPLASTY,ALY;  Surgeon: Puma Hobson MD;  [facility]: [facility];  Service: Thoracic    AL ESOPHAGOGASTRODUODENOSCOPY TRANSORAL DIAGNOSTIC N/A 10/5/2023    Procedure: ESOPHAGOGASTRODUODENOSCOPY (EGD);  Surgeon: Puma Hobson MD;  [facility]: [facility];  Service: Thoracic    AL LAPS RPR PARAESPHGL HRNA INCL FUNDPLSTY W/O MESH N/A 10/5/2023    Procedure: laparoscopic paraesophageal hernia repair, with mesh, fundoplication;  Surgeon: Puma Hobson MD;  Location: [facility];  Service: Thoracic    TUBAL LIGATION       Family History   Problem Relation Age of Onset    Lymphoma Mother 70        acute    Hyperlipidemia Father     Peripheral vascular disease Father     Breast cancer Sister     Leukemia Brother     Cancer Brother         leukemia    Other Other         4 children living     Social History     Tobacco Use    Smoking status: Former     Current packs/day: 0.00     Average packs/day: 0.3 packs/day for 9.0 years (2.3 ttl pk-yrs)     Types: Cigarettes     Start date:      Quit date: 1970     Years since quittin.3    Smokeless tobacco: Never    Tobacco comments:     about 45 years ago     Vaping Use    Vaping status: Never Used   Substance and Sexual Activity    Alcohol use: Not Currently     Comment: rare occasion    Drug use: No    Sexual activity: Not Currently     Current Outpatient Medications on File Prior to Visit   Medication Sig    Cholecalciferol (VITAMIN D3) 2000 units CHEW Chew 2 each daily     COD LIVER OIL PO Take by mouth 1 tsp daily    cyanocobalamin (VITAMIN B-12) 1000 MCG tablet Take 1,000 mcg by mouth daily    ketoconazole (NIZORAL) 2 % cream APPLY TWO TIMES DAILY TO EYEBROWS AND NASAL BRIDGE    levothyroxine (Synthroid) 75 mcg tablet Take 1 tablet (75 mcg total) by mouth daily    mometasone (ELOCON) 0.1 % cream Apply topically daily as needed (allergies)    olmesartan (BENICAR) 5 mg tablet Take 0.5 tablets (2.5 mg total) by mouth daily     Allergies   Allergen Reactions    Other Allergic Rhinitis     Seasonal allergies      Immunization History   Administered Date(s) Administered    COVID-19 PFIZER VACCINE 0.3 ML IM 01/19/2021, 02/08/2021, 11/06/2021    Tdap 11/24/2013     Objective   There were no vitals taken for this visit.    Physical Exam  HEENT normal  Face does show flushing of both cheeks  Respiratory: Clear to auscultation, no wheezing, rales or rhonchi  Cardiovascular: Regular rate and rhythm, no murmurs, rubs, or gallops  Extremities: No edema, no cyanosis  Physical Exam    Gen: NAD Face appears mildly flushed  Heent: atraumatic, normocephalic  Mouth: is moist  Neck: is supple, no JVD, no carotid bruits.   Heart: is regular Normal s1, s2,  Lungs: are clear to auscultation  Abd: soft, non tender, NABS  Ext: no edema, distal pulses normal  Skin: Rosacea  Mood: normal affect  Neuro: AAOx3

## 2025-04-16 NOTE — PROGRESS NOTES
Name: Elin Engel      : 1945      MRN: [id number]  Encounter Provider: Destiny Carter MD  Encounter Date: 2025   Encounter department: [facility] CARE [place]    Assessment & Plan       No chief complaint on file.     History of Present Illness   {?Quick Links Encounters * My Last Note * Last Note in Specialty * Snapshot * Since Last Visit * History :07787}  HPI  Review of Systems  Past Medical History:   Diagnosis Date    LYLE (acute kidney injury) (HCC) 2021    Dehydration after exertion 2021    Disease of thyroid gland     Hypertension     Osteopenia     last assessed 13    PONV (postoperative nausea and vomiting)     Syncope and collapse 2021    Vaginal atrophy     Vasovagal near-syncope 2021     Past Surgical History:   Procedure Laterality Date    COLONOSCOPY      DILATION AND CURETTAGE OF UTERUS      GASTROPLASTY N/A 10/5/2023    Procedure: GASTROPLASTY,ALY;  Surgeon: Puma Hobson MD;  [facility];  Service: Thoracic    DC ESOPHAGOGASTRODUODENOSCOPY TRANSORAL DIAGNOSTIC N/A 10/5/2023    Procedure: ESOPHAGOGASTRODUODENOSCOPY (EGD);  Surgeon: Puma Hobson MD;  [facility];  Service: Thoracic    DC LAPS RPR PARAESPHGL HRNA INCL FUNDPLSTY W/O MESH N/A 10/5/2023    Procedure: laparoscopic paraesophageal hernia repair, with mesh, fundoplication;  Surgeon: Puma Hobson MD;  [facility];  Service: Thoracic    TUBAL LIGATION       Family History   Problem Relation Age of Onset    Lymphoma Mother 70        acute    Hyperlipidemia Father     Peripheral vascular disease Father     Breast cancer Sister     Leukemia Brother     Cancer Brother         leukemia    Other Other         4 children living     Social History     Tobacco Use    Smoking status: Former     Current packs/day: 0.00     Average packs/day: 0.3 packs/day for 9.0 years (2.3 ttl pk-yrs)     Types: Cigarettes      Start date:      Quit date: 1970     Years since quittin.3    Smokeless tobacco: Never    Tobacco comments:     about 45 years ago    Vaping Use    Vaping status: Never Used   Substance and Sexual Activity    Alcohol use: Not Currently     Comment: rare occasion    Drug use: No    Sexual activity: Not Currently     Current Outpatient Medications on File Prior to Visit   Medication Sig    Cholecalciferol (VITAMIN D3) 2000 units CHEW Chew 2 each daily     COD LIVER OIL PO Take by mouth 1 tsp daily    cyanocobalamin (VITAMIN B-12) 1000 MCG tablet Take 1,000 mcg by mouth daily    ketoconazole (NIZORAL) 2 % cream APPLY TWO TIMES DAILY TO EYEBROWS AND NASAL BRIDGE    levothyroxine (Synthroid) 75 mcg tablet Take 1 tablet (75 mcg total) by mouth daily    mometasone (ELOCON) 0.1 % cream Apply topically daily as needed (allergies)    olmesartan (BENICAR) 5 mg tablet Take 0.5 tablets (2.5 mg total) by mouth daily     Allergies   Allergen Reactions    Other Allergic Rhinitis     Seasonal allergies      Immunization History   Administered Date(s) Administered    COVID-19 PFIZER VACCINE 0.3 ML IM 2021, 2021, 2021    Tdap 2013     Objective {?Quick Links Trend Vitals * Enter New Vitals * Results Review * Timeline (Adult) * Labs * Imaging * Cardiology * Procedures * Lung Cancer Screening * Surgical eConsent :12420}  There were no vitals taken for this visit.    Physical Exam    Gen: NAD, face midly flushed due to hx of Rosacea  Heent: atraumatic, normocephalic  Mouth: is moist  Neck: is supple, no JVD, no carotid bruits.   Heart: is regular Normal s1, s2,  Lungs: are clear to auscultation  Abd: soft, non tender, NABS  Ext: no edema, distal pulses normal  Skin: no rashes, ulcers  Mood: normal affect  Neuro: AAOx3   {Administrative / Billing Section (Optional):69421}

## 2025-04-16 NOTE — ASSESSMENT & PLAN NOTE
Orders:    carvedilol (COREG) 3.125 mg tablet; Take 1 tablet (3.125 mg total) by mouth 2 (two) times a day with meals    CBC and differential; Future    Comprehensive metabolic panel; Future

## 2025-04-23 ENCOUNTER — TELEPHONE (OUTPATIENT)
Age: 80
End: 2025-04-23

## 2025-04-23 DIAGNOSIS — R60.0 BILATERAL LEG EDEMA: Primary | ICD-10-CM

## 2025-04-23 NOTE — TELEPHONE ENCOUNTER
Order for compression stockings faxed to [facility] and mailed to patient.

## 2025-04-23 NOTE — TELEPHONE ENCOUNTER
Daughter states that she took the script for compressions stockings to [facility]. [facility] states that the diagnoses on the script will not cover the stockings.    Daughter is requesting the Ultra Sheer Stockings 15-20mm compression thigh high 4 pairs    Daughter is asking if a script could be faxed to [facility] and a copy be mailed in case there are any issues when she goes to pick them up.

## 2025-07-01 DIAGNOSIS — I10 HYPERTENSION, ESSENTIAL: ICD-10-CM

## 2025-07-02 RX ORDER — CARVEDILOL 3.12 MG/1
3.12 TABLET ORAL 2 TIMES DAILY WITH MEALS
Qty: 60 TABLET | Refills: 5 | Status: SHIPPED | OUTPATIENT
Start: 2025-07-02

## 2025-07-12 DIAGNOSIS — E01.8 IODINE HYPOTHYROIDISM: ICD-10-CM

## 2025-07-14 RX ORDER — LEVOTHYROXINE SODIUM 75 UG/1
75 TABLET ORAL DAILY
Qty: 90 TABLET | Refills: 0 | Status: SHIPPED | OUTPATIENT
Start: 2025-07-14

## (undated) DEVICE — 3M™ IOBAN™ 2 ANTIMICROBIAL INCISE DRAPE 6650EZ: Brand: IOBAN™ 2

## (undated) DEVICE — SUT ETHIBOND 0 SH/SH 36 IN X524H

## (undated) DEVICE — TROCAR: Brand: KII® SLEEVE

## (undated) DEVICE — TUBING SUCTION 5MM X 12 FT

## (undated) DEVICE — INTENDED FOR TISSUE SEPARATION, AND OTHER PROCEDURES THAT REQUIRE A SHARP SURGICAL BLADE TO PUNCTURE OR CUT.: Brand: BARD-PARKER SAFETY BLADES SIZE 11, STERILE

## (undated) DEVICE — DISSECTOR: Brand: ENDO DISSECT

## (undated) DEVICE — PLEDGET CARDIO PTFE 9.5 X 4.8 SOFT LF (6EA/PK)

## (undated) DEVICE — Device: Brand: OMNICLOSE TROCAR SITE CLOSURE DEVICE

## (undated) DEVICE — Device: Brand: DEFENDO AIR/WATER/SUCTION AND BIOPSY VALVE

## (undated) DEVICE — PENROSE DRAIN, 18 X 3 8: Brand: CARDINAL HEALTH

## (undated) DEVICE — 40595 XL TRENDELENBURG POSITIONING KIT: Brand: 40595 XL TRENDELENBURG POSITIONING KIT

## (undated) DEVICE — TROCAR: Brand: KII FIOS FIRST ENTRY

## (undated) DEVICE — INVIEW CLEAR LEGGINGS: Brand: CONVERTORS

## (undated) DEVICE — FIRST STEP BEDSIDE KIT - STAND-UP POUCH, ENDOSCOPIC CLEANING PAD - 1 POUCH: Brand: FIRST STEP BEDSIDE KIT - STAND-UP POUCH, ENDOSCOPIC CLEANING PAD

## (undated) DEVICE — GLOVE INDICATOR PI UNDERGLOVE SZ 8 BLUE

## (undated) DEVICE — PDS II VLT 0 107CM AG ST3: Brand: ENDOLOOP

## (undated) DEVICE — TK® QUICK LOAD® UNIT: Brand: TK® QUICK LOAD®

## (undated) DEVICE — INSUFFLATION NEEDLE TO ESTABLISH PNEUMOPERITONEUM.: Brand: INSUFFLATION NEEDLE

## (undated) DEVICE — ADHESIVE SKIN HIGH VISCOSITY EXOFIN 1ML

## (undated) DEVICE — TISSUE RETRIEVAL SYSTEM: Brand: INZII RETRIEVAL SYSTEM

## (undated) DEVICE — AIR AND WATER TUBING/CAP SET FOR OLYMPUS® SCOPES: Brand: ERBE

## (undated) DEVICE — METZENBAUM ADTEC SINGLE USE DISSECTING SCISSORS, SHAFT ONLY, MONOPOLAR, CURVED TO LEFT, WORKING LENGTH: 12 1/4", (310 MM), DIAM. 5 MM, INSULATED, DOUBLE ACTION, STERILE, DISPOSABLE, PACKAGE OF 10 PIECES: Brand: AESCULAP

## (undated) DEVICE — TROCARS: Brand: KII® OPTICAL ACCESS SYSTEM

## (undated) DEVICE — TRAY FOLEY 16FR URIMETER SURESTEP

## (undated) DEVICE — HARMONIC ACE 5MM DIAMETER SHEARS 36CM SHAFT LENGTH + ADAPTIVE TISSUE TECHNOLOGY FOR USE WITH GENERATOR G11: Brand: HARMONIC ACE

## (undated) DEVICE — SUT MONOCRYL 4-0 PS-2 18 IN Y496G

## (undated) DEVICE — GLOVE SRG BIOGEL ECLIPSE 7.5

## (undated) DEVICE — SUT VICRYL 0 REEL 54 IN J287G

## (undated) DEVICE — NEEDLE 25G X 1 1/2

## (undated) DEVICE — DRAPE FLUID WARMER (BIRD BATH)

## (undated) DEVICE — TK® TI-KNOT® DEVICE: Brand: TK® TI-KNOT®

## (undated) DEVICE — INSUFLATION TUBING INSUFLOW (LEXION)

## (undated) DEVICE — UNIVERSAL STAPLER: Brand: ENDO GIA ULTRA

## (undated) DEVICE — ARTICULATION RELOAD WITH TRI-STAPLE TECHNOLOGY: Brand: ENDO GIA

## (undated) DEVICE — PACK PBDS LAP CHOLE RF